# Patient Record
Sex: FEMALE | Race: WHITE | Employment: STUDENT | URBAN - METROPOLITAN AREA
[De-identification: names, ages, dates, MRNs, and addresses within clinical notes are randomized per-mention and may not be internally consistent; named-entity substitution may affect disease eponyms.]

---

## 2017-01-23 ENCOUNTER — PRENATAL OFFICE VISIT (OUTPATIENT)
Dept: OBGYN | Facility: CLINIC | Age: 22
End: 2017-01-23
Payer: COMMERCIAL

## 2017-01-23 VITALS — BODY MASS INDEX: 17.16 KG/M2 | DIASTOLIC BLOOD PRESSURE: 76 MMHG | SYSTOLIC BLOOD PRESSURE: 106 MMHG | WEIGHT: 100 LBS

## 2017-01-23 DIAGNOSIS — O09.91 SUPERVISION OF HIGH RISK PREGNANCY IN FIRST TRIMESTER: Primary | ICD-10-CM

## 2017-01-23 PROCEDURE — 99207 ZZC PRENATAL VISIT: CPT | Performed by: OBSTETRICS & GYNECOLOGY

## 2017-01-23 NOTE — MR AVS SNAPSHOT
"              After Visit Summary   1/23/2017    Rima Irving    MRN: [id number]           Patient Information     Date Of Birth          1995        Visit Information        Provider Department      1/23/2017 3:20 PM Alivia Arizmendi MD [facility]        Today's Diagnoses     Supervision of high risk pregnancy in first trimester    -  1        Follow-ups after your visit        Your next 10 appointments already scheduled     Feb 20, 2017  3:20 PM   ESTABLISHED PRENATAL with Alivia Arizmendi MD   [facility] ([facility])    [address] [place] [address]   [phone]              Who to contact     If you have questions or need follow up information about today's clinic visit or your schedule please contact [facility] directly at [phone].  Normal or non-critical lab and imaging results will be communicated to you by MyChart, letter or phone within 4 business days after the clinic has received the results. If you do not hear from us within 7 days, please contact the clinic through MyChart or phone. If you have a critical or abnormal lab result, we will notify you by phone as soon as possible.  Submit refill requests through [employer] or call your pharmacy and they will forward the refill request to us. Please allow 3 business days for your refill to be completed.          Additional Information About Your Visit        MyChart Information     Fam[employer]et lets you send messages to your doctor, view your test results, renew your prescriptions, schedule appointments and more. To sign up, go to [email or web address] . Click on \"Log in\" on the left side of the screen, which will take you to the Welcome page. Then click on \"Sign up Now\" on the right side of the page.     You will be asked to enter the access code listed below, as well as some personal information. Please follow the directions to create " your username and password.     Your access code is: [email or web address]  Expires: 3/9/2017 11:33 AM     Your access code will  in 90 days. If you need help or a new code, please call your [facility] clinic or [phone].        Care EveryWhere ID     This is your Care EveryWhere ID. This could be used by other organizations to access your [facility] medical records  [id number]        Your Vitals Were     Last Period                   10/24/2016 (Exact Date)            Blood Pressure from Last 3 Encounters:   17 106/76   16 102/68   16 94/54    Weight from Last 3 Encounters:   17 100 lb (45.36 kg)   16 102 lb 6.4 oz (46.448 kg)   16 101 lb (45.813 kg)              Today, you had the following     No orders found for display       Primary Care Provider    Physician No Ref-Primary       No address on file        Thank you!     Thank you for choosing [facility]  for your care. Our goal is always to provide you with excellent care. Hearing back from our patients is one way we can continue to improve our services. Please take a few minutes to complete the written survey that you may receive in the mail after your visit with us. Thank you!             Your Updated Medication List - Protect others around you: Learn how to safely use, store and throw away your medicines at www.disposemymeds.org.          This list is accurate as of: 17  3:31 PM.  Always use your most recent med list.                   Brand Name Dispense Instructions for use    prenatal multivitamin  with iron 28-0.8 MG Tabs     90 each    Take 1 tablet by mouth daily

## 2017-05-01 ENCOUNTER — HOSPITAL ENCOUNTER (OUTPATIENT)
Facility: CLINIC | Age: 22
Discharge: HOME | End: 2017-05-01
Attending: OBSTETRICS & GYNECOLOGY | Admitting: OBSTETRICS & GYNECOLOGY
Payer: COMMERCIAL

## 2017-05-01 ENCOUNTER — PRENATAL OFFICE VISIT (OUTPATIENT)
Dept: OBGYN | Facility: CLINIC | Age: 22
End: 2017-05-01
Payer: COMMERCIAL

## 2017-05-01 VITALS — DIASTOLIC BLOOD PRESSURE: 67 MMHG | TEMPERATURE: 99 F | RESPIRATION RATE: 16 BRPM | SYSTOLIC BLOOD PRESSURE: 112 MMHG

## 2017-05-01 VITALS — DIASTOLIC BLOOD PRESSURE: 56 MMHG | BODY MASS INDEX: 20.43 KG/M2 | SYSTOLIC BLOOD PRESSURE: 104 MMHG | WEIGHT: 119 LBS

## 2017-05-01 DIAGNOSIS — I49.49 PREMATURE CONTRACTION: ICD-10-CM

## 2017-05-01 DIAGNOSIS — O09.92 SUPERVISION OF HIGH RISK PREGNANCY IN SECOND TRIMESTER: Primary | ICD-10-CM

## 2017-05-01 LAB — FIBRONECTIN FETAL VAG QL: NORMAL

## 2017-05-01 PROCEDURE — 96372 THER/PROPH/DIAG INJ SC/IM: CPT

## 2017-05-01 PROCEDURE — 82731 ASSAY OF FETAL FIBRONECTIN: CPT | Performed by: OBSTETRICS & GYNECOLOGY

## 2017-05-01 PROCEDURE — 99213 OFFICE O/P EST LOW 20 MIN: CPT | Mod: 25

## 2017-05-01 PROCEDURE — 99207 ZZC PRENATAL VISIT: CPT | Performed by: OBSTETRICS & GYNECOLOGY

## 2017-05-01 PROCEDURE — 25000125 ZZHC RX 250: Performed by: OBSTETRICS & GYNECOLOGY

## 2017-05-01 RX ORDER — BETAMETHASONE SODIUM PHOSPHATE AND BETAMETHASONE ACETATE 3; 3 MG/ML; MG/ML
12 INJECTION, SUSPENSION INTRA-ARTICULAR; INTRALESIONAL; INTRAMUSCULAR; SOFT TISSUE EVERY 24 HOURS
Status: DISCONTINUED | OUTPATIENT
Start: 2017-05-01 | End: 2017-05-01 | Stop reason: HOSPADM

## 2017-05-01 RX ORDER — BETAMETHASONE SODIUM PHOSPHATE AND BETAMETHASONE ACETATE 3; 3 MG/ML; MG/ML
INJECTION, SUSPENSION INTRA-ARTICULAR; INTRALESIONAL; INTRAMUSCULAR; SOFT TISSUE
Status: DISCONTINUED
Start: 2017-05-01 | End: 2017-05-01 | Stop reason: HOSPADM

## 2017-05-01 RX ADMIN — BETAMETHASONE SODIUM PHOSPHATE AND BETAMETHASONE ACETATE 12 MG: 3; 3 INJECTION, SUSPENSION INTRA-ARTICULAR; INTRALESIONAL; INTRAMUSCULAR at 17:28

## 2017-05-01 NOTE — IP AVS SNAPSHOT
MRN:[id number]                      After Visit Summary   5/1/2017    Rima Irving    MRN: [id number]           Thank you!     Thank you for choosing [facility] for your care. Our goal is always to provide you with excellent care. Hearing back from our patients is one way we can continue to improve our services. Please take a few minutes to complete the written survey that you may receive in the mail after you visit with us. Thank you!        Patient Information     Date Of Birth          1995        About your hospital stay     You were admitted on:  May 1, 2017 You last received care in the:  [facility]    You were discharged on:  May 1, 2017       Who to Call     For medical emergencies, please call 911.  For non-urgent questions about your medical care, please call your primary care provider or clinic, None          Attending Provider     Provider Specialty    Alivia Arizmendi MD OB/Gyn       Primary Care Provider    Physician No Ref-Primary       No address on file        Your next 10 appointments already scheduled     May 10, 2017  2:00 PM CDT   LAB with WE LAB   [facility]    [address] [place] [id number]   [phone]           Patient must bring picture ID.  Patient should be prepared to give a urine specimen  Please do not eat 10-12 hours before your appointment if you are coming in fasting for labs on lipids, cholesterol, or glucose (sugar).  Pregnant women should follow their Care Team instructions. Water with medications is okay. Do not drink coffee or other fluids.   If you have concerns about taking  your medications, please ask at office or if scheduling via [employer], send a message by clicking on Secure Messaging, Message Your Care Team.            May 10, 2017  2:15 PM CDT   US PELVIC COMPLETE W TRANSVAGINAL with WEUS1   [facility] ([facility]  [place])    [address] [address]   [phone]           Please bring a list of your medicines (including vitamins, minerals and over-the-counter drugs). Also, tell your doctor about any allergies you may have. Wear comfortable clothes and leave your valuables at home.  Adults: Drink six 8-ounce glasses of fluid one hour before your exam. Do NOT empty your bladder.  If you need to empty your bladder before your exam, try to release only a little bit of urine. Then, drink another 8oz glass of fluid.  Children: Children who are potty trained should drink at least 4 cups (32 oz) of liquid 45 minutes to one hour prior to the exam. The child s bladder must be full in order to achieve a diagnostic exam. If your child is very uncomfortable or has an urgent need to pee, please notify a technologist; they will try to find out how much longer the wait may be and provide instructions to help relieve the pressure. Occasionally it is medically necessary to insert a urinary catheter to fill the bladder.  Please call the Imaging Department at your exam site with any questions.            May 10, 2017  2:50 PM CDT   ESTABLISHED PRENATAL with Alivia Arizmendi MD   [facility] [place] ([facility])    [address] [id number] [phone]              Further instructions from your care team       Discharge Instructions for Undelivered Patients      You were seen for: Labor Assessment  We Consulted: Dr. Arizmendi  You had (Test or Medicine): fetal & uterine monitoring, Betamethasone injection     Diet:   Drink 8 to 12 glasses of liquids (milk, juice, water) every day.  You may eat meals and snacks.     Activity:  Count fetal kicks everyday (see handout)  Call your doctor or nurse midwife if your baby is moving less than usual.   No intercourse until further notice with Doctor.    Call your provider if you notice:  Swelling in your face  "or increased swelling in your hands or legs.  Headaches that are not relieved by Tylenol (acetaminophen).  Changes in your vision (blurring: seeing spots or stars.)  Nausea (sick to your stomach) and vomiting (throwing up).   Weight gain of 5 pounds or more per week.  Heartburn that doesn't go away.  Signs of bladder infection: pain when you urinate (use the toilet), need to go more often and more urgently.  The bag of sevilla (rupture of membranes) breaks, or you notice leaking in your underwear.  Bright red blood in your underwear.  Abdominal (lower belly) or stomach pain.  For first baby: Contractions (tightening) less than 5 minutes apart for one hour or more.  Second (plus) baby: Contractions (tightening) less than 10 minutes apart and getting stronger.  *If less than 34 weeks: Contractions (tightenings) more than 6 times in one hour.  Increase or change in vaginal discharge (note the color and amount)    Follow-up:  As scheduled in the clinic - Wednesday 5/3/17          Pending Results     No orders found from 2017 to 2017.            Admission Information     Date & Time Provider Department Dept. Phone    2017 Alivia Arizmendi MD [facility] [phone]      Your Vitals Were     Last Period                   10/24/2016 (Exact Date)           MyChart Information     [employer] lets you send messages to your doctor, view your test results, renew your prescriptions, schedule appointments and more. To sign up, go to [email or web address] . Click on \"Log in\" on the left side of the screen, which will take you to the Welcome page. Then click on \"Sign up Now\" on the right side of the page.     You will be asked to enter the access code listed below, as well as some personal information. Please follow the directions to create your username and password.     Your access code is: [email or web address]  Expires: 2017 10:46 AM     Your access code will  in 90 days. If you need help or a new " code, please call your [facility] clinic or [phone].        Care EveryWhere ID     This is your Care EveryWhere ID. This could be used by other organizations to access your [facility] medical records  [id number]           Review of your medicines      UNREVIEWED medicines. Ask your doctor about these medicines        Dose / Directions    prenatal multivitamin  with iron 28-0.8 MG Tabs   Used for:  Pregnancy test performed, pregnancy confirmed        Dose:  1 tablet   Take 1 tablet by mouth daily   Quantity:  90 each   Refills:  2                Protect others around you: Learn how to safely use, store and throw away your medicines at www.disposemymeds.org.             Medication List: This is a list of all your medications and when to take them. Check marks below indicate your daily home schedule. Keep this list as a reference.      Medications           Morning Afternoon Evening Bedtime As Needed    prenatal multivitamin  with iron 28-0.8 MG Tabs   Take 1 tablet by mouth daily

## 2017-05-01 NOTE — DISCHARGE INSTRUCTIONS
Discharge Instructions for Undelivered Patients      You were seen for: Labor Assessment  We Consulted: Dr. Arizmendi  You had (Test or Medicine): fetal & uterine monitoring, Betamethasone injection     Diet:   Drink 8 to 12 glasses of liquids (milk, juice, water) every day.  You may eat meals and snacks.     Activity:  Count fetal kicks everyday (see handout)  Call your doctor or nurse midwife if your baby is moving less than usual.   No intercourse until further notice with Doctor.    Call your provider if you notice:  Swelling in your face or increased swelling in your hands or legs.  Headaches that are not relieved by Tylenol (acetaminophen).  Changes in your vision (blurring: seeing spots or stars.)  Nausea (sick to your stomach) and vomiting (throwing up).   Weight gain of 5 pounds or more per week.  Heartburn that doesn't go away.  Signs of bladder infection: pain when you urinate (use the toilet), need to go more often and more urgently.  The bag of sevilla (rupture of membranes) breaks, or you notice leaking in your underwear.  Bright red blood in your underwear.  Abdominal (lower belly) or stomach pain.  For first baby: Contractions (tightening) less than 5 minutes apart for one hour or more.  Second (plus) baby: Contractions (tightening) less than 10 minutes apart and getting stronger.  *If less than 34 weeks: Contractions (tightenings) more than 6 times in one hour.  Increase or change in vaginal discharge (note the color and amount)    Follow-up:  As scheduled in the clinic - Wednesday 5/3/17

## 2017-05-01 NOTE — PROGRESS NOTES
Patient presents to [facility] ambulatory at 1552 at 27w0d  for a  labor evaluation.  She was seen in the clinic today and had a +FFN.  She notes having intermittent low abdominal pressure x1 week.  Cervix was closed.  She denies vaginal bleeding, leaking fluid, contractions, and notes +FM.  She consents to fetal & uterine monitoring.  She notes having psoriasis x3 years now; once in a while she uses lotion or water to help the itching during pregnancy, otherwise doesn't use medication.  She denies current alcohol, drug, or tobacco use.  She notes quitting smoking at the beginning of pregnancy.     Category 1 tracing, no contractions noted for the first 30 minutes of monitoring, then occasional with irritability.  Pt notes only feeling low abdominal pressure while standing or walking.  She notes some low abdominal pressure while sitting in bed here for the last 30 minutes of monitoring, emptied bladder and felt better.  Spoke to Dr. Arizmendi on phone to give update.  Pt notes having had intercourse in the last 24 hours, possibly a false positive FFN result.  Pt denies feeling regular pressure or contractions.  Dr. Arizmendi recommends Betamethasone injection, then d/c to home.  Informed pt and her significant other about Betamethasone, her boyfriend is very hesitant stating he doesn't want any vaccines given.  He questions the ingredients in the medication; pharmacy sent the medication package insert information for him to read.  He doesn't agree with the medication, even after thorough discussion, but the patient does consent.  She states her understanding of the Betamethasone and consents for me to inject it.  She knows to return to the [facility] tomorrow at 1730.  D/C instructions printed and explained to pt - she states understanding, then signs.

## 2017-05-01 NOTE — MR AVS SNAPSHOT
After Visit Summary   5/1/2017    Rima Irving    MRN: [id number]           Patient Information     Date Of Birth          1995        Visit Information        Provider Department      5/1/2017 10:20 AM Alivia [facility] MD [facility]        Today's Diagnoses     Supervision of high risk pregnancy in second trimester    -  1    Premature contraction           Follow-ups after your visit        Your next 10 appointments already scheduled     May 10, 2017  2:00 PM CDT   [facility]    [address] [place] [address]   [phone]           Patient must bring picture ID.  Patient should be prepared to give a urine specimen  Please do not eat 10-12 hours before your appointment if you are coming in fasting for labs on lipids, cholesterol, or glucose (sugar).  Pregnant women should follow their Care Team instructions. Water with medications is okay. Do not drink coffee or other fluids.   If you have concerns about taking  your medications, please ask at office or if scheduling via [employer], send a message by clicking on Secure Messaging, Message Your Care Team.            May 10, 2017  2:15 PM CDT   US PELVIC COMPLETE W TRANSVAGINAL with WEUS[facility]    [address] [place] [address]   [phone]           Please bring a list of your medicines (including vitamins, minerals and over-the-counter drugs). Also, tell your doctor about any allergies you may have. Wear comfortable clothes and leave your valuables at home.  Adults: Drink six 8-ounce glasses of fluid one hour before your exam. Do NOT empty your bladder.  If you need to empty your bladder before your exam, try to release only a little bit of urine. Then, drink another 8oz glass of fluid.  Children: Children who are potty trained should  "drink at least 4 cups (32 oz) of liquid 45 minutes to one hour prior to the exam. The child s bladder must be full in order to achieve a diagnostic exam. If your child is very uncomfortable or has an urgent need to pee, please notify a technologist; they will try to find out how much longer the wait may be and provide instructions to help relieve the pressure. Occasionally it is medically necessary to insert a urinary catheter to fill the bladder.  Please call the Imaging Department at your exam site with any questions.            May 10, 2017  2:50 PM CDT   ESTABLISHED PRENATAL with Alivia Arizmendi MD   [facility] ([facility])    [address] [place] [id number] [phone]              Who to contact     If you have questions or need follow up information about today's clinic visit or your schedule please contact [facility] [place] directly at [phone].  Normal or non-critical lab and imaging results will be communicated to you by redIThart, letter or phone within 4 business days after the clinic has received the results. If you do not hear from us within 7 days, please contact the clinic through redIThart or phone. If you have a critical or abnormal lab result, we will notify you by phone as soon as possible.  Submit refill requests through [employer] or call your pharmacy and they will forward the refill request to us. Please allow 3 business days for your refill to be completed.          Additional Information About Your Visit        [employer] Information     [employer] lets you send messages to your doctor, view your test results, renew your prescriptions, schedule appointments and more. To sign up, go to [email or web address] . Click on \"Log in\" on the left side of the screen, which will take you to the Welcome page. Then click on \"Sign up Now\" on the right side of the page.     You will be asked to enter the access code listed " below, as well as some personal information. Please follow the directions to create your username and password.     Your access code is: [email or web address]  Expires: 2017 10:46 AM     Your access code will  in 90 days. If you need help or a new code, please call your [facility] clinic or [phone].        Care EveryWhere ID     This is your Care EveryWhere ID. This could be used by other organizations to access your [facility] medical records  [id number]        Your Vitals Were     Last Period BMI (Body Mass Index)                10/24/2016 (Exact Date) 20.43 kg/m2           Blood Pressure from Last 3 Encounters:   17 104/56   17 106/76   16 102/68    Weight from Last 3 Encounters:   17 119 lb (54 kg)   17 100 lb (45.4 kg)   16 102 lb 6.4 oz (46.4 kg)              We Performed the Following     Fetal fibronectin        Primary Care Provider    Physician No Ref-Primary       No address on file        Thank you!     Thank you for choosing [facility]  for your care. Our goal is always to provide you with excellent care. Hearing back from our patients is one way we can continue to improve our services. Please take a few minutes to complete the written survey that you may receive in the mail after your visit with us. Thank you!             Your Updated Medication List - Protect others around you: Learn how to safely use, store and throw away your medicines at www.disposemymeds.org.          This list is accurate as of: 17 10:51 AM.  Always use your most recent med list.                   Brand Name Dispense Instructions for use    prenatal multivitamin  with iron 28-0.8 MG Tabs     90 each    Take 1 tablet by mouth daily

## 2017-05-01 NOTE — PROGRESS NOTES
Return 1 week for ob complete us and gluc screen  ffn done today due to some pressure symptoms  Has not shown for prenatal care since 13 wks  Cervix closed, no bleeding or leaking fluid

## 2017-05-01 NOTE — IP AVS SNAPSHOT
[address], [address]    [facility] [address]    Phone:  [phone]                                       After Visit Summary   5/1/2017    Rima Irving    MRN: [id number]           After Visit Summary Signature Page     I have received my discharge instructions, and my questions have been answered. I have discussed any challenges I see with this plan with the nurse or doctor.    ..........................................................................................................................................  Patient/Patient Representative Signature      ..........................................................................................................................................  Patient Representative Print Name and Relationship to Patient    ..................................................               ................................................  Date                                            Time    ..........................................................................................................................................  Reviewed by Signature/Title    ...................................................              ..............................................  Date                                                            Time

## 2017-05-10 ENCOUNTER — PRENATAL OFFICE VISIT (OUTPATIENT)
Dept: OBGYN | Facility: CLINIC | Age: 22
End: 2017-05-10
Payer: COMMERCIAL

## 2017-05-10 ENCOUNTER — RADIANT APPOINTMENT (OUTPATIENT)
Dept: ULTRASOUND IMAGING | Facility: CLINIC | Age: 22
End: 2017-05-10
Payer: COMMERCIAL

## 2017-05-10 VITALS — SYSTOLIC BLOOD PRESSURE: 90 MMHG | DIASTOLIC BLOOD PRESSURE: 58 MMHG | WEIGHT: 122 LBS | BODY MASS INDEX: 20.94 KG/M2

## 2017-05-10 DIAGNOSIS — O09.92 SUPERVISION OF HIGH RISK PREGNANCY IN SECOND TRIMESTER: Primary | ICD-10-CM

## 2017-05-10 DIAGNOSIS — Z36.89 ENCOUNTER FOR FETAL ANATOMIC SURVEY: ICD-10-CM

## 2017-05-10 DIAGNOSIS — O09.92 SUPERVISION OF HIGH RISK PREGNANCY IN SECOND TRIMESTER: ICD-10-CM

## 2017-05-10 DIAGNOSIS — Z23 NEED FOR TDAP VACCINATION: ICD-10-CM

## 2017-05-10 DIAGNOSIS — Z36.9 ENCOUNTER FOR ANTENATAL SCREENING OF MOTHER: Primary | ICD-10-CM

## 2017-05-10 LAB
GLUCOSE 1H P 50 G GLC PO SERPL-MCNC: 102 MG/DL (ref 60–129)
HGB BLD-MCNC: 10.3 G/DL (ref 11.7–15.7)

## 2017-05-10 PROCEDURE — 82950 GLUCOSE TEST: CPT | Performed by: OBSTETRICS & GYNECOLOGY

## 2017-05-10 PROCEDURE — 76805 OB US >/= 14 WKS SNGL FETUS: CPT | Performed by: OBSTETRICS & GYNECOLOGY

## 2017-05-10 PROCEDURE — 00000218 ZZHCL STATISTIC OBHBG - HEMOGLOBIN: Performed by: OBSTETRICS & GYNECOLOGY

## 2017-05-10 PROCEDURE — 99207 ZZC PRENATAL VISIT: CPT | Performed by: OBSTETRICS & GYNECOLOGY

## 2017-05-10 PROCEDURE — 36415 COLL VENOUS BLD VENIPUNCTURE: CPT | Performed by: OBSTETRICS & GYNECOLOGY

## 2017-05-10 NOTE — PROGRESS NOTES
+FFN last week so received betasmethasone inj,  Could have been false positive from intercourse day before but no way to tell  Feels ok now   Normal anatomy scan today  Denies ctx, bleeding  Good fm

## 2017-05-10 NOTE — MR AVS SNAPSHOT
"              After Visit Summary   5/10/2017    Rima Irving    MRN: [id number]           Patient Information     Date Of Birth          1995        Visit Information        Provider Department      5/10/2017 2:50 PM Alivia Arizmendi MD [facility]        Today's Diagnoses     Supervision of high risk pregnancy in second trimester    -  1       Follow-ups after your visit        Future tests that were ordered for you today     Open Future Orders        Priority Expected Expires Ordered    TDAP VACCINE (ADACEL) Routine  5/31/2017 5/9/2017    ADMIN 1st VACCINE Routine  5/31/2017 5/9/2017            Who to contact     If you have questions or need follow up information about today's clinic visit or your schedule please contact [facility] directly at [phone].  Normal or non-critical lab and imaging results will be communicated to you by MyChart, letter or phone within 4 business days after the clinic has received the results. If you do not hear from us within 7 days, please contact the clinic through MyChart or phone. If you have a critical or abnormal lab result, we will notify you by phone as soon as possible.  Submit refill requests through [employer] or call your pharmacy and they will forward the refill request to us. Please allow 3 business days for your refill to be completed.          Additional Information About Your Visit        MyChart Information     [employer] lets you send messages to your doctor, view your test results, renew your prescriptions, schedule appointments and more. To sign up, go to [email or web address] . Click on \"Log in\" on the left side of the screen, which will take you to the Welcome page. Then click on \"Sign up Now\" on the right side of the page.     You will be asked to enter the access code listed below, as well as some personal information. Please follow the directions to create your username and password.     Your access code is: " [email or web address]  Expires: 2017 10:46 AM     Your access code will  in 90 days. If you need help or a new code, please call your [place] clinic or [phone].        Care EveryWhere ID     This is your Care EveryWhere ID. This could be used by other organizations to access your [place] medical records  [id number]        Your Vitals Were     Last Period BMI (Body Mass Index)                10/24/2016 (Exact Date) 20.94 kg/m2           Blood Pressure from Last 3 Encounters:   05/10/17 90/58   17 112/67   17 104/56    Weight from Last 3 Encounters:   05/10/17 122 lb (55.3 kg)   17 119 lb (54 kg)   17 100 lb (45.4 kg)              Today, you had the following     No orders found for display       Primary Care Provider    Physician No Ref-Primary       No address on file        Thank you!     Thank you for choosing [facility] [place]  for your care. Our goal is always to provide you with excellent care. Hearing back from our patients is one way we can continue to improve our services. Please take a few minutes to complete the written survey that you may receive in the mail after your visit with us. Thank you!             Your Updated Medication List - Protect others around you: Learn how to safely use, store and throw away your medicines at www.disposemymeds.org.          This list is accurate as of: 5/10/17  3:16 PM.  Always use your most recent med list.                   Brand Name Dispense Instructions for use    prenatal multivitamin  with iron 28-0.8 MG Tabs     90 each    Take 1 tablet by mouth daily

## 2017-05-24 ENCOUNTER — PRENATAL OFFICE VISIT (OUTPATIENT)
Dept: OBGYN | Facility: CLINIC | Age: 22
End: 2017-05-24
Payer: COMMERCIAL

## 2017-05-24 VITALS — SYSTOLIC BLOOD PRESSURE: 118 MMHG | WEIGHT: 123 LBS | BODY MASS INDEX: 21.11 KG/M2 | DIASTOLIC BLOOD PRESSURE: 62 MMHG

## 2017-05-24 DIAGNOSIS — O09.93 SUPERVISION OF HIGH RISK PREGNANCY IN THIRD TRIMESTER: ICD-10-CM

## 2017-05-24 PROCEDURE — 99207 ZZC PRENATAL VISIT: CPT | Performed by: OBSTETRICS & GYNECOLOGY

## 2017-05-24 NOTE — MR AVS SNAPSHOT
"              After Visit Summary   5/24/2017    Rima Irving    MRN: [id number]           Patient Information     Date Of Birth          1995        Visit Information        Provider Department      5/24/2017 4:10 PM Alivia Arizmendi MD [facility]        Today's Diagnoses     Supervision of high risk pregnancy in third trimester           Follow-ups after your visit        Your next 10 appointments already scheduled     Jun 14, 2017  4:10 PM CDT   ESTABLISHED PRENATAL with Alivia Arizmendi MD   [facility] ([facility])    [address] [place] [id number]   [phone]              Who to contact     If you have questions or need follow up information about today's clinic visit or your schedule please contact [facility] directly at [phone].  Normal or non-critical lab and imaging results will be communicated to you by MyChart, letter or phone within 4 business days after the clinic has received the results. If you do not hear from us within 7 days, please contact the clinic through MyChart or phone. If you have a critical or abnormal lab result, we will notify you by phone as soon as possible.  Submit refill requests through [employer] or call your pharmacy and they will forward the refill request to us. Please allow 3 business days for your refill to be completed.          Additional Information About Your Visit        MyChart Information     [employer] lets you send messages to your doctor, view your test results, renew your prescriptions, schedule appointments and more. To sign up, go to [email or web address] . Click on \"Log in\" on the left side of the screen, which will take you to the Welcome page. Then click on \"Sign up Now\" on the right side of the page.     You will be asked to enter the access code listed below, as well as some personal information. Please follow the directions to create " your username and password.     Your access code is: [email or web address]  Expires: 2017 10:46 AM     Your access code will  in 90 days. If you need help or a new code, please call your [place] clinic or [phone].        Care EveryWhere ID     This is your Care EveryWhere ID. This could be used by other organizations to access your [place] medical records  [id number]        Your Vitals Were     Last Period BMI (Body Mass Index)                10/24/2016 (Exact Date) 21.11 kg/m2           Blood Pressure from Last 3 Encounters:   17 118/62   05/10/17 90/58   17 112/67    Weight from Last 3 Encounters:   17 123 lb (55.8 kg)   05/10/17 122 lb (55.3 kg)   17 119 lb (54 kg)              Today, you had the following     No orders found for display       Primary Care Provider    Physician No Ref-Primary       No address on file        Thank you!     Thank you for choosing [facility] [place]  for your care. Our goal is always to provide you with excellent care. Hearing back from our patients is one way we can continue to improve our services. Please take a few minutes to complete the written survey that you may receive in the mail after your visit with us. Thank you!             Your Updated Medication List - Protect others around you: Learn how to safely use, store and throw away your medicines at www.disposemymeds.org.          This list is accurate as of: 17  5:02 PM.  Always use your most recent med list.                   Brand Name Dispense Instructions for use    prenatal multivitamin  with iron 28-0.8 MG Tabs     90 each    Take 1 tablet by mouth daily

## 2017-06-14 ENCOUNTER — PRENATAL OFFICE VISIT (OUTPATIENT)
Dept: OBGYN | Facility: CLINIC | Age: 22
End: 2017-06-14
Payer: COMMERCIAL

## 2017-06-14 VITALS — WEIGHT: 125 LBS | SYSTOLIC BLOOD PRESSURE: 102 MMHG | DIASTOLIC BLOOD PRESSURE: 56 MMHG | BODY MASS INDEX: 21.46 KG/M2

## 2017-06-14 DIAGNOSIS — O09.93 SUPERVISION OF HIGH RISK PREGNANCY IN THIRD TRIMESTER: ICD-10-CM

## 2017-06-14 PROCEDURE — 99207 ZZC PRENATAL VISIT: CPT | Performed by: OBSTETRICS & GYNECOLOGY

## 2017-06-14 NOTE — MR AVS SNAPSHOT
"              After Visit Summary   6/14/2017    Rima Irving    MRN: [id number]           Patient Information     Date Of Birth          1995        Visit Information        Provider Department      6/14/2017 4:10 PM Alivia Arizmendi MD [facility]        Today's Diagnoses     Supervision of high risk pregnancy in third trimester           Follow-ups after your visit        Your next 10 appointments already scheduled     Jun 26, 2017  4:20 PM CDT   ESTABLISHED PRENATAL with Ashia Mendoza MD   [facility]    [address] [place] [id number]   [phone]              Who to contact     If you have questions or need follow up information about today's clinic visit or your schedule please contact [facility] directly at [phone].  Normal or non-critical lab and imaging results will be communicated to you by MyChart, letter or phone within 4 business days after the clinic has received the results. If you do not hear from us within 7 days, please contact the clinic through MyChart or phone. If you have a critical or abnormal lab result, we will notify you by phone as soon as possible.  Submit refill requests through [employer] or call your pharmacy and they will forward the refill request to us. Please allow 3 business days for your refill to be completed.          Additional Information About Your Visit        MyChart Information     [employer] lets you send messages to your doctor, view your test results, renew your prescriptions, schedule appointments and more. To sign up, go to [email or web address] . Click on \"Log in\" on the left side of the screen, which will take you to the Welcome page. Then click on \"Sign up Now\" on the right side of the page.     You will be asked to enter the access code listed below, as well as some personal information. Please follow the directions to create " your username and password.     Your access code is: [email or web address]  Expires: 2017 10:46 AM     Your access code will  in 90 days. If you need help or a new code, please call your [place] clinic or [phone].        Care EveryWhere ID     This is your Care EveryWhere ID. This could be used by other organizations to access your [place] medical records  [id number]        Your Vitals Were     Last Period BMI (Body Mass Index)                10/24/2016 (Exact Date) 21.46 kg/m2           Blood Pressure from Last 3 Encounters:   17 102/56   17 118/62   05/10/17 90/58    Weight from Last 3 Encounters:   17 125 lb (56.7 kg)   17 123 lb (55.8 kg)   05/10/17 122 lb (55.3 kg)              Today, you had the following     No orders found for display       Primary Care Provider    Physician No Ref-Primary       No address on file        Thank you!     Thank you for choosing [facility] [place]  for your care. Our goal is always to provide you with excellent care. Hearing back from our patients is one way we can continue to improve our services. Please take a few minutes to complete the written survey that you may receive in the mail after your visit with us. Thank you!             Your Updated Medication List - Protect others around you: Learn how to safely use, store and throw away your medicines at www.disposemymeds.org.          This list is accurate as of: 17  4:46 PM.  Always use your most recent med list.                   Brand Name Dispense Instructions for use    prenatal multivitamin  with iron 28-0.8 MG Tabs     90 each    Take 1 tablet by mouth daily

## 2017-06-26 ENCOUNTER — PRENATAL OFFICE VISIT (OUTPATIENT)
Dept: OBGYN | Facility: CLINIC | Age: 22
End: 2017-06-26
Payer: COMMERCIAL

## 2017-06-26 VITALS — BODY MASS INDEX: 21.8 KG/M2 | WEIGHT: 127 LBS | SYSTOLIC BLOOD PRESSURE: 112 MMHG | DIASTOLIC BLOOD PRESSURE: 70 MMHG

## 2017-06-26 DIAGNOSIS — O09.93 SUPERVISION OF HIGH RISK PREGNANCY IN THIRD TRIMESTER: Primary | ICD-10-CM

## 2017-06-26 PROCEDURE — 99207 ZZC PRENATAL VISIT: CPT | Performed by: OBSTETRICS & GYNECOLOGY

## 2017-06-26 NOTE — MR AVS SNAPSHOT
"              After Visit Summary   6/26/2017    Rima Irving    MRN: [id number]           Patient Information     Date Of Birth          1995        Visit Information        Provider Department      6/26/2017 4:20 PM Ashia Mendoza MD [facility]        Today's Diagnoses     Supervision of high risk pregnancy in third trimester           Follow-ups after your visit        Your next 10 appointments already scheduled     Jul 03, 2017  3:50 PM CDT   ESTABLISHED PRENATAL with Alivia Arizmendi MD   [facility] ([facility])    [address] [place] [address]   [phone]              Who to contact     If you have questions or need follow up information about today's clinic visit or your schedule please contact [facility] directly at [phone].  Normal or non-critical lab and imaging results will be communicated to you by MyChart, letter or phone within 4 business days after the clinic has received the results. If you do not hear from us within 7 days, please contact the clinic through MyChart or phone. If you have a critical or abnormal lab result, we will notify you by phone as soon as possible.  Submit refill requests through [employer] or call your pharmacy and they will forward the refill request to us. Please allow 3 business days for your refill to be completed.          Additional Information About Your Visit        MyChart Information     [employer] lets you send messages to your doctor, view your test results, renew your prescriptions, schedule appointments and more. To sign up, go to [email or web address] . Click on \"Log in\" on the left side of the screen, which will take you to the Welcome page. Then click on \"Sign up Now\" on the right side of the page.     You will be asked to enter the access code listed below, as well as some personal information. Please follow the directions to create " your username and password.     Your access code is: [email or web address]  Expires: 2017 10:46 AM     Your access code will  in 90 days. If you need help or a new code, please call your [facility] clinic or [phone].        Care EveryWhere ID     This is your Care EveryWhere ID. This could be used by other organizations to access your [facility] medical records  [id number]        Your Vitals Were     Last Period BMI (Body Mass Index)                10/24/2016 (Exact Date) 21.8 kg/m2           Blood Pressure from Last 3 Encounters:   17 112/70   17 102/56   17 118/62    Weight from Last 3 Encounters:   17 127 lb (57.6 kg)   17 125 lb (56.7 kg)   17 123 lb (55.8 kg)              Today, you had the following     No orders found for display       Primary Care Provider    Physician No Ref-Primary       No address on file        Equal Access to Services     [facility] COLETTE : Hadii aad ku hadasho Soomaali, waaxda luqadaha, qaybta kaalmada adeegyada, waxay tamela gray . So [facility] [phone].    ATENCIÓN: Si habla español, tiene a mckeon disposición servicios gratuitos de asistencia lingüística. Llame al [phone].    We comply with applicable federal civil rights laws and [place] laws. We do not discriminate on the basis of race, color, national origin, age, disability sex, sexual orientation or gender identity.            Thank you!     Thank you for choosing [facility] [place]  for your care. Our goal is always to provide you with excellent care. Hearing back from our patients is one way we can continue to improve our services. Please take a few minutes to complete the written survey that you may receive in the mail after your visit with us. Thank you!             Your Updated Medication List - Protect others around you: Learn how to safely use, store and throw away your medicines at www.disposemymeds.org.          This list is accurate as of:  6/26/17  5:14 PM.  Always use your most recent med list.                   Brand Name Dispense Instructions for use Diagnosis    prenatal multivitamin  with iron 28-0.8 MG Tabs     90 each    Take 1 tablet by mouth daily    Pregnancy test performed, pregnancy confirmed

## 2017-06-27 ENCOUNTER — TELEPHONE (OUTPATIENT)
Dept: OBGYN | Facility: CLINIC | Age: 22
End: 2017-06-27

## 2017-06-27 DIAGNOSIS — O09.93 SUPERVISION OF HIGH RISK PREGNANCY IN THIRD TRIMESTER: ICD-10-CM

## 2017-06-27 NOTE — PROGRESS NOTES
"Patient was scheduled with me by mistake and was surprised to not be seeing [facility].  Feeling good FM. Complains of severe and painful ctx all the time when laying down. Not having them while at work. Likely not of concern since distraction seems to make her not notice them  On leopold's patient \"screamed\" quickly in pain when palpating at pubic bone. Very little pain tolerance. Found to presumably be breech. BSUS done and confirmed breech. Head in upper left and feet in mid left. Buttocks down.   Discussed ECV vs spontaneous version and primary c/s. Discussed why vaginal breech delivery not recommended.  Patient's pain tolerance will make an ECV impossible and she agrees. Is agreeable to scheduling a 39 weeks c/s and hoping for spont version.  Will see MC next appointment and have GBS and cvx check and discuss in more detail.      Please schedule for surgery:    Patient Name:  Rima Irving ([id number]).  :  1995      Physician requests assist from:  per mc  Requested Dates:  17(presumably unless  says otherwise)  Schedule based on:  EDC  Amount of time needed for the procedure:  1.5 hrs   Expected time off from work:  8 wks  Surgeon:  Alivia Arizmendi  Surgery permit to read:  Primary c/s  Preop Needed:  No  Location for surgery to performed:   L[facility]D  Anesthesia:  Regional block   No Known Allergies  Nickel allergy:  Not Applicable  EMB: Not applicable,     DIAGNOSIS:  Breech IUP at 39+3                        "

## 2017-06-27 NOTE — TELEPHONE ENCOUNTER
Physician requests assist from:  per mc  Requested Dates:  7/20/17(presumably unless  says otherwise)  Schedule based on:  EDC  Amount of time needed for the procedure:  1.5 hrs                     Expected time off from work:  8 wks  Surgeon:  Alivia Arizmendi  Surgery permit to read:  Primary c/s  Preop Needed:  No  Location for surgery to performed:   L&D  Anesthesia:  Regional block   No Known Allergies  Nickel allergy:  Not Applicable  EMB: Not applicable,      DIAGNOSIS:  Breech IUP at 39+3

## 2017-06-27 NOTE — TELEPHONE ENCOUNTER
Patient surgery scheduled on 7/20/2017 at 1PM  Check in 11AM  Location for surgery to performed:   L&D  Scheduled by Namita 6/27/2017     Information Packet given :Yes: MAILED 6/27/2017    CPT codes given: No            Consents signed? N/A  Rep Informed :N/A    PREOP DATE :  NA  In Epic :Yes    On Spreadsheet :Yes    On Calendar EB  :No    In [place] Calendar MAGDALENE  :No    Assist TBD VM for Nithya 6/27/2017 Text to Saray   Assist in Epic TBD  Assist Notified as needed :Yes see above    Iza Ballard  Surgery Scheduler

## 2017-06-28 NOTE — TELEPHONE ENCOUNTER
Per Dr Arizmendi she will go this alone and no assist is necessary    Iza Ballard  Surgery Scheduler

## 2017-07-10 ENCOUNTER — PRENATAL OFFICE VISIT (OUTPATIENT)
Dept: OBGYN | Facility: CLINIC | Age: 22
End: 2017-07-10
Payer: COMMERCIAL

## 2017-07-10 VITALS — BODY MASS INDEX: 22.66 KG/M2 | DIASTOLIC BLOOD PRESSURE: 60 MMHG | WEIGHT: 132 LBS | SYSTOLIC BLOOD PRESSURE: 98 MMHG

## 2017-07-10 DIAGNOSIS — Z36.85 SCREENING, ANTENATAL, FOR STREPTOCOCCUS B: Primary | ICD-10-CM

## 2017-07-10 DIAGNOSIS — O09.93 SUPERVISION OF HIGH RISK PREGNANCY IN THIRD TRIMESTER: ICD-10-CM

## 2017-07-10 PROCEDURE — 87653 STREP B DNA AMP PROBE: CPT | Performed by: OBSTETRICS & GYNECOLOGY

## 2017-07-10 PROCEDURE — 99207 ZZC PRENATAL VISIT: CPT | Performed by: OBSTETRICS & GYNECOLOGY

## 2017-07-10 NOTE — MR AVS SNAPSHOT
"              After Visit Summary   7/10/2017    Rima Irving    MRN: [id number]           Patient Information     Date Of Birth          1995        Visit Information        Provider Department      7/10/2017 4:20 PM Alivia Arizmendi MD [facility]        Today's Diagnoses     Screening, , for Streptococcus B    -  1    Supervision of high risk pregnancy in third trimester        Breech, red, not applicable or unspecified fetus           Follow-ups after your visit        Your next 10 appointments already scheduled     2017   Procedure with Alivia Arizmendi MD    Birthplace (--)    [address]  [facility] [address] [phone]              Who to contact     If you have questions or need follow up information about today's clinic visit or your schedule please contact [facility] directly at [phone].  Normal or non-critical lab and imaging results will be communicated to you by MyChart, letter or phone within 4 business days after the clinic has received the results. If you do not hear from us within 7 days, please contact the clinic through MyChart or phone. If you have a critical or abnormal lab result, we will notify you by phone as soon as possible.  Submit refill requests through [employer] or call your pharmacy and they will forward the refill request to us. Please allow 3 business days for your refill to be completed.          Additional Information About Your Visit        MyChart Information     [employer] lets you send messages to your doctor, view your test results, renew your prescriptions, schedule appointments and more. To sign up, go to [email or web address] . Click on \"Log in\" on the left side of the screen, which will take you to the Welcome page. Then click on \"Sign up Now\" on the right side of the page.     You will be asked to enter the access code listed below, as well as some personal information. Please " follow the directions to create your username and password.     Your access code is: [email or web address]  Expires: 2017 10:46 AM     Your access code will  in 90 days. If you need help or a new code, please call your [place] clinic or [phone].        Care EveryWhere ID     This is your Care EveryWhere ID. This could be used by other organizations to access your [place] medical records  [id number]        Your Vitals Were     Last Period BMI (Body Mass Index)                10/24/2016 (Exact Date) 22.66 kg/m2           Blood Pressure from Last 3 Encounters:   07/10/17 98/60   17 112/70   17 102/56    Weight from Last 3 Encounters:   07/10/17 59.9 kg (132 lb)   17 57.6 kg (127 lb)   17 56.7 kg (125 lb)              We Performed the Following     Strep, Group B by [facility]        Primary Care Provider    Physician No Ref-Primary       No address on file        Equal Access to Services     RICKY ALVARENGA : Hadii emiliano maldonadoo Sojedali, waaxda luqadaha, qaybta kaalmada adeegyaleticia, meghan gray . So [facility] [phone].    ATENCIÓN: Si habla español, tiene a mckeon disposición servicios gratuitos de asistencia lingüística. Llame al [phone].    We comply with applicable federal civil rights laws and [place] laws. We do not discriminate on the basis of race, color, national origin, age, disability sex, sexual orientation or gender identity.            Thank you!     Thank you for choosing [facility] [place]  for your care. Our goal is always to provide you with excellent care. Hearing back from our patients is one way we can continue to improve our services. Please take a few minutes to complete the written survey that you may receive in the mail after your visit with us. Thank you!             Your Updated Medication List - Protect others around you: Learn how to safely use, store and throw away your medicines at www.disposemymeds.org.          This list  is accurate as of: 7/10/17  5:44 PM.  Always use your most recent med list.                   Brand Name Dispense Instructions for use Diagnosis    prenatal multivitamin  with iron 28-0.8 MG Tabs     90 each    Take 1 tablet by mouth daily    Pregnancy test performed, pregnancy confirmed       ZANTAC PO      Take 150 mg by mouth

## 2017-07-10 NOTE — PROGRESS NOTES
Patient is scheduled for c section on 7/20  Doesn't tolerate vaginal exams  Finally agreed to gbs test today

## 2017-07-12 LAB
GP B STREP DNA SPEC QL NAA+PROBE: NORMAL
SPECIMEN SOURCE: NORMAL

## 2017-07-20 ENCOUNTER — SURGERY (OUTPATIENT)
Age: 22
End: 2017-07-20

## 2017-07-20 ENCOUNTER — ANESTHESIA EVENT (OUTPATIENT)
Dept: OBGYN | Facility: CLINIC | Age: 22
End: 2017-07-20
Payer: COMMERCIAL

## 2017-07-20 ENCOUNTER — ANESTHESIA (OUTPATIENT)
Dept: OBGYN | Facility: CLINIC | Age: 22
End: 2017-07-20
Payer: COMMERCIAL

## 2017-07-20 ENCOUNTER — HOSPITAL ENCOUNTER (OUTPATIENT)
Facility: CLINIC | Age: 22
Discharge: HOME | End: 2017-07-20
Attending: OBSTETRICS & GYNECOLOGY | Admitting: OBSTETRICS & GYNECOLOGY
Payer: COMMERCIAL

## 2017-07-20 VITALS
DIASTOLIC BLOOD PRESSURE: 72 MMHG | SYSTOLIC BLOOD PRESSURE: 113 MMHG | HEIGHT: 64 IN | RESPIRATION RATE: 18 BRPM | TEMPERATURE: 98.6 F | BODY MASS INDEX: 22.71 KG/M2 | WEIGHT: 133 LBS

## 2017-07-20 LAB
ABO + RH BLD: NORMAL
ABO + RH BLD: NORMAL
BLD GP AB SCN SERPL QL: NORMAL
BLOOD BANK CMNT PATIENT-IMP: NORMAL
ERYTHROCYTE [DISTWIDTH] IN BLOOD BY AUTOMATED COUNT: 13.9 % (ref 10–15)
HCT VFR BLD AUTO: 32.7 % (ref 35–47)
HGB BLD-MCNC: 10.7 G/DL (ref 11.7–15.7)
MCH RBC QN AUTO: 27.4 PG (ref 26.5–33)
MCHC RBC AUTO-ENTMCNC: 32.7 G/DL (ref 31.5–36.5)
MCV RBC AUTO: 84 FL (ref 78–100)
PLATELET # BLD AUTO: 144 10E9/L (ref 150–450)
RBC # BLD AUTO: 3.9 10E12/L (ref 3.8–5.2)
SPECIMEN EXP DATE BLD: NORMAL
WBC # BLD AUTO: 15.9 10E9/L (ref 4–11)

## 2017-07-20 PROCEDURE — 85027 COMPLETE CBC AUTOMATED: CPT | Performed by: OBSTETRICS & GYNECOLOGY

## 2017-07-20 PROCEDURE — 36415 COLL VENOUS BLD VENIPUNCTURE: CPT | Performed by: OBSTETRICS & GYNECOLOGY

## 2017-07-20 PROCEDURE — 86900 BLOOD TYPING SEROLOGIC ABO: CPT | Performed by: OBSTETRICS & GYNECOLOGY

## 2017-07-20 PROCEDURE — 86780 TREPONEMA PALLIDUM: CPT | Performed by: OBSTETRICS & GYNECOLOGY

## 2017-07-20 PROCEDURE — 86850 RBC ANTIBODY SCREEN: CPT | Performed by: OBSTETRICS & GYNECOLOGY

## 2017-07-20 PROCEDURE — 86901 BLOOD TYPING SEROLOGIC RH(D): CPT | Performed by: OBSTETRICS & GYNECOLOGY

## 2017-07-20 PROCEDURE — 12000029 ZZH R&B OB INTERMEDIATE

## 2017-07-20 PROCEDURE — 99214 OFFICE O/P EST MOD 30 MIN: CPT

## 2017-07-20 RX ORDER — CEFAZOLIN SODIUM 2 G/100ML
2 INJECTION, SOLUTION INTRAVENOUS
Status: DISCONTINUED | OUTPATIENT
Start: 2017-07-20 | End: 2017-07-20 | Stop reason: HOSPADM

## 2017-07-20 RX ORDER — SODIUM CHLORIDE, SODIUM LACTATE, POTASSIUM CHLORIDE, CALCIUM CHLORIDE 600; 310; 30; 20 MG/100ML; MG/100ML; MG/100ML; MG/100ML
INJECTION, SOLUTION INTRAVENOUS CONTINUOUS
Status: DISCONTINUED | OUTPATIENT
Start: 2017-07-20 | End: 2017-07-20 | Stop reason: HOSPADM

## 2017-07-20 RX ORDER — LIDOCAINE 40 MG/G
CREAM TOPICAL
Status: DISCONTINUED | OUTPATIENT
Start: 2017-07-20 | End: 2017-07-20 | Stop reason: HOSPADM

## 2017-07-20 RX ORDER — CITRIC ACID/SODIUM CITRATE 334-500MG
30 SOLUTION, ORAL ORAL
Status: DISCONTINUED | OUTPATIENT
Start: 2017-07-20 | End: 2017-07-20 | Stop reason: HOSPADM

## 2017-07-20 RX ORDER — ACETAMINOPHEN 325 MG/1
975 TABLET ORAL ONCE
Status: DISCONTINUED | OUTPATIENT
Start: 2017-07-20 | End: 2017-07-20 | Stop reason: HOSPADM

## 2017-07-20 RX ORDER — CEFAZOLIN SODIUM 1 G/3ML
1 INJECTION, POWDER, FOR SOLUTION INTRAMUSCULAR; INTRAVENOUS
Status: DISCONTINUED | OUTPATIENT
Start: 2017-07-20 | End: 2017-07-20 | Stop reason: HOSPADM

## 2017-07-20 NOTE — ANESTHESIA PREPROCEDURE EVALUATION
Anesthesia Evaluation     . Pt has not had prior anesthetic            ROS/MED HX    ENT/Pulmonary:      (-) sleep apnea   Neurologic:       Cardiovascular:         METS/Exercise Tolerance:     Hematologic:         Musculoskeletal:         GI/Hepatic:     (+) GERD       Renal/Genitourinary:         Endo:         Psychiatric:         Infectious Disease:         Malignancy:         Other:                     Physical Exam  Normal systems: dental    Airway   Mallampati: I  TM distance: >3 FB  Neck ROM: full    Dental     Cardiovascular   Rhythm and rate: regular      Pulmonary    breath sounds clear to auscultation                    Anesthesia Plan      History & Physical Review  History and physical reviewed and following examination; no interval change.    ASA Status:  2 .        Plan for General and ETT with Intravenous induction.   PONV prophylaxis:  Ondansetron (or other 5HT-3)       Postoperative Care      Consents  Anesthetic plan, risks, benefits and alternatives discussed with:  Patient..                          .

## 2017-07-20 NOTE — DISCHARGE INSTRUCTIONS
Discharge Instruction for Undelivered Patients      You were seen for:  section pre operative care  We Consulted: Dr. Arizmendi  You had (Test or Medicine): Fetal monitoring, lab work and ultrasound     Diet:   Drink 8 to 12 glasses of liquids (milk, juice, water) every day.  You may eat meals and snacks.     Activity:  Call your doctor or nurse midwife if your baby is moving less than usual.     Call your provider if you notice:  Swelling in your face or increased swelling in your hands or legs.  Headaches that are not relieved by Tylenol (acetaminophen).  Changes in your vision (blurring: seeing spots or stars.)  Nausea (sick to your stomach) and vomiting (throwing up).   Weight gain of 5 pounds or more per week.  Heartburn that doesn't go away.  Signs of bladder infection: pain when you urinate (use the toilet), need to go more often and more urgently.  The bag of sevilla (rupture of membranes) breaks, or you notice leaking in your underwear.  Bright red blood in your underwear.  Abdominal (lower belly) or stomach pain.  For first baby: Contractions (tightening) less than 5 minutes apart for one hour or more.  Second (plus) baby: Contractions (tightening) less than 10 minutes apart and getting stronger.  *If less than 34 weeks: Contractions (tightenings) more than 6 times in one hour.  Increase or change in vaginal discharge (note the color and amount)  Other: U    Follow-up:  As scheduled in the clinic

## 2017-07-20 NOTE — PLAN OF CARE
Gestational age noted to be 38+3 in pre procedure. Charge RN notified of gestational age. Dr. Arizmendi at bedside to discuss inability to do  section today due to gestational age. Bedside ultrasound performed by Dr. Arizmendi showed baby head down. Plan to discharge to home and anticipate future .

## 2017-07-20 NOTE — PROGRESS NOTES
Patient presented to [facility] for scheduled c section but dates were less than 39 wks so can't go ahead    Bedside ultrasound today shows cephalic presentation today    Anesthesia says her plaque psoriasis over lumbar area precludes regional anesthesia so when she returns in labor we will need to do nitrous, hydrotherapy and iv meds  If patient can't tolerate labor and decides on primary c section, general anesthesia will be required    Discussed with patient and boyfriend

## 2017-07-20 NOTE — IP AVS SNAPSHOT
[address], [address]    [facility] [address]    Phone:  [phone]                                       After Visit Summary   7/20/2017    Rima Irving    MRN: [id number]           After Visit Summary Signature Page     I have received my discharge instructions, and my questions have been answered. I have discussed any challenges I see with this plan with the nurse or doctor.    ..........................................................................................................................................  Patient/Patient Representative Signature      ..........................................................................................................................................  Patient Representative Print Name and Relationship to Patient    ..................................................               ................................................  Date                                            Time    ..........................................................................................................................................  Reviewed by Signature/Title    ...................................................              ..............................................  Date                                                            Time

## 2017-07-20 NOTE — IP AVS SNAPSHOT
MRN:[id number]                      After Visit Summary   2017    Rima Irving    MRN: [id number]           Thank you!     Thank you for choosing [facility] for your care. Our goal is always to provide you with excellent care. Hearing back from our patients is one way we can continue to improve our services. Please take a few minutes to complete the written survey that you may receive in the mail after you visit with us. Thank you!        Patient Information     Date Of Birth          1995        About your hospital stay     You were admitted on:  2017 You last received care in the:  [facility]    You were discharged on:  2017        Reason for your hospital stay       Indication for labor and delivery                  Who to Call     For medical emergencies, please call 911.  For non-urgent questions about your medical care, please call your primary care provider or clinic, None  For questions related to your surgery, please call your surgery clinic        Attending Provider     Provider Alivia Soriano MD OB/Gyn       Primary Care Provider    Physician No Ref-Primary      Further instructions from your care team       Discharge Instruction for Undelivered Patients      You were seen for:  section pre operative care  We Consulted: Dr. Arizmendi  You had (Test or Medicine): Fetal monitoring, lab work and ultrasound     Diet:   Drink 8 to 12 glasses of liquids (milk, juice, water) every day.  You may eat meals and snacks.     Activity:  Call your doctor or nurse midwife if your baby is moving less than usual.     Call your provider if you notice:  Swelling in your face or increased swelling in your hands or legs.  Headaches that are not relieved by Tylenol (acetaminophen).  Changes in your vision (blurring: seeing spots or stars.)  Nausea (sick to your stomach) and vomiting (throwing up).   Weight gain of 5 pounds or more per  "week.  Heartburn that doesn't go away.  Signs of bladder infection: pain when you urinate (use the toilet), need to go more often and more urgently.  The bag of sevilla (rupture of membranes) breaks, or you notice leaking in your underwear.  Bright red blood in your underwear.  Abdominal (lower belly) or stomach pain.  For first baby: Contractions (tightening) less than 5 minutes apart for one hour or more.  Second (plus) baby: Contractions (tightening) less than 10 minutes apart and getting stronger.  *If less than 34 weeks: Contractions (tightenings) more than 6 times in one hour.  Increase or change in vaginal discharge (note the color and amount)  Other: U    Follow-up:  As scheduled in the clinic          Pending Results     Date and Time Order Name Status Description    7/20/2017 1135 Anti Treponema In process             Statement of Approval     Ordered          07/20/17 7667  I have reviewed and agree with all the recommendations and orders detailed in this document.  EFFECTIVE NOW     Approved and electronically signed by:  Alivia Arizmendi MD             Admission Information     Date & Time Provider Department Dept. Phone    7/20/2017 Alivia Arizmendi MD [facility] [phone]      Your Vitals Were     Blood Pressure Temperature Respirations Height Weight Last Period    113/72 98.6  F (37  C) (Temporal) 18 1.626 m (5' 4\") 60.3 kg (133 lb) 10/24/2016 (Exact Date)    BMI (Body Mass Index)                   22.83 kg/m2           MyChart Information     [employer] lets you send messages to your doctor, view your test results, renew your prescriptions, schedule appointments and more. To sign up, go to [email or web address] . Click on \"Log in\" on the left side of the screen, which will take you to the Welcome page. Then click on \"Sign up Now\" on the right side of the page.     You will be asked to enter the access code listed below, as well as some personal information. Please follow the " directions to create your username and password.     Your access code is: [email or web address]  Expires: 2017 10:46 AM     Your access code will  in 90 days. If you need help or a new code, please call your [place] clinic or [phone].        Care EveryWhere ID     This is your Care EveryWhere ID. This could be used by other organizations to access your [place] medical records  [id number]        Equal Access to Services     KEVIN ALVARENGA : Hadii aad ku hadasho Soomaali, waaxda luqadaha, qaybta kaalmada adeegyada, waxay jessiein hayracquel gray . So [facility] [phone].    ATENCIÓN: Si habla español, tiene a mckeon disposición servicios gratuitos de asistencia lingüística. Llame al [phone].    We comply with applicable federal civil rights laws and [place] laws. We do not discriminate on the basis of race, color, national origin, age, disability sex, sexual orientation or gender identity.               Review of your medicines      CONTINUE these medicines which have NOT CHANGED        Dose / Directions    prenatal multivitamin  with iron 28-0.8 MG Tabs   Used for:  Pregnancy test performed, pregnancy confirmed        Dose:  1 tablet   Take 1 tablet by mouth daily   Quantity:  90 each   Refills:  2       ZANTAC PO        Dose:  150 mg   Take 150 mg by mouth   Refills:  0                Protect others around you: Learn how to safely use, store and throw away your medicines at www.disposemymeds.org.             Medication List: This is a list of all your medications and when to take them. Check marks below indicate your daily home schedule. Keep this list as a reference.      Medications           Morning Afternoon Evening Bedtime As Needed    prenatal multivitamin  with iron 28-0.8 MG Tabs   Take 1 tablet by mouth daily                                ZANTAC PO   Take 150 mg by mouth

## 2017-07-21 LAB — T PALLIDUM IGG+IGM SER QL: NEGATIVE

## 2017-08-02 ENCOUNTER — HOSPITAL ENCOUNTER (INPATIENT)
Facility: CLINIC | Age: 22
LOS: 4 days | Discharge: HOME | End: 2017-08-06
Attending: OBSTETRICS & GYNECOLOGY | Admitting: OBSTETRICS & GYNECOLOGY
Payer: COMMERCIAL

## 2017-08-02 ENCOUNTER — NURSE TRIAGE (OUTPATIENT)
Dept: NURSING | Facility: CLINIC | Age: 22
End: 2017-08-02

## 2017-08-02 DIAGNOSIS — G89.18 ACUTE POST-OPERATIVE PAIN: Primary | ICD-10-CM

## 2017-08-02 DIAGNOSIS — D62 ANEMIA DUE TO BLOOD LOSS, ACUTE: ICD-10-CM

## 2017-08-02 PROBLEM — Z36.89 ENCOUNTER FOR TRIAGE IN PREGNANT PATIENT: Status: ACTIVE | Noted: 2017-08-02

## 2017-08-02 PROBLEM — Z34.90 PREGNANCY: Status: ACTIVE | Noted: 2017-08-02

## 2017-08-02 LAB
ABO + RH BLD: NORMAL
ABO + RH BLD: NORMAL
AMPHETAMINES UR QL SCN: NORMAL
BARBITURATES UR QL: NORMAL
BASOPHILS # BLD AUTO: 0 10E9/L (ref 0–0.2)
BASOPHILS NFR BLD AUTO: 0.2 %
BENZODIAZ UR QL: NORMAL
BLD GP AB SCN SERPL QL: NORMAL
BLOOD BANK CMNT PATIENT-IMP: NORMAL
CANNABINOIDS UR QL SCN: NORMAL
COCAINE UR QL: NORMAL
DIFFERENTIAL METHOD BLD: ABNORMAL
EOSINOPHIL # BLD AUTO: 0.2 10E9/L (ref 0–0.7)
EOSINOPHIL NFR BLD AUTO: 1.4 %
ERYTHROCYTE [DISTWIDTH] IN BLOOD BY AUTOMATED COUNT: 14.6 % (ref 10–15)
HCT VFR BLD AUTO: 33.2 % (ref 35–47)
HGB BLD-MCNC: 10.6 G/DL (ref 11.7–15.7)
IMM GRANULOCYTES # BLD: 0.1 10E9/L (ref 0–0.4)
IMM GRANULOCYTES NFR BLD: 0.5 %
LYMPHOCYTES # BLD AUTO: 1.4 10E9/L (ref 0.8–5.3)
LYMPHOCYTES NFR BLD AUTO: 9.9 %
MCH RBC QN AUTO: 26.6 PG (ref 26.5–33)
MCHC RBC AUTO-ENTMCNC: 31.9 G/DL (ref 31.5–36.5)
MCV RBC AUTO: 83 FL (ref 78–100)
MONOCYTES # BLD AUTO: 1 10E9/L (ref 0–1.3)
MONOCYTES NFR BLD AUTO: 6.9 %
NEUTROPHILS # BLD AUTO: 11.2 10E9/L (ref 1.6–8.3)
NEUTROPHILS NFR BLD AUTO: 81.1 %
NRBC # BLD AUTO: 0 10*3/UL
NRBC BLD AUTO-RTO: 0 /100
OPIATES UR QL SCN: NORMAL
PCP UR QL SCN: NORMAL
PLATELET # BLD AUTO: 152 10E9/L (ref 150–450)
RBC # BLD AUTO: 3.98 10E12/L (ref 3.8–5.2)
SPECIMEN EXP DATE BLD: NORMAL
WBC # BLD AUTO: 13.8 10E9/L (ref 4–11)

## 2017-08-02 PROCEDURE — 85025 COMPLETE CBC W/AUTO DIFF WBC: CPT | Performed by: OBSTETRICS & GYNECOLOGY

## 2017-08-02 PROCEDURE — 86901 BLOOD TYPING SEROLOGIC RH(D): CPT | Performed by: OBSTETRICS & GYNECOLOGY

## 2017-08-02 PROCEDURE — 80307 DRUG TEST PRSMV CHEM ANLYZR: CPT | Performed by: OBSTETRICS & GYNECOLOGY

## 2017-08-02 PROCEDURE — 12000029 ZZH R&B OB INTERMEDIATE

## 2017-08-02 PROCEDURE — 86900 BLOOD TYPING SEROLOGIC ABO: CPT | Performed by: OBSTETRICS & GYNECOLOGY

## 2017-08-02 PROCEDURE — 59025 FETAL NON-STRESS TEST: CPT | Performed by: NURSE PRACTITIONER

## 2017-08-02 PROCEDURE — 86850 RBC ANTIBODY SCREEN: CPT | Performed by: OBSTETRICS & GYNECOLOGY

## 2017-08-02 PROCEDURE — 36415 COLL VENOUS BLD VENIPUNCTURE: CPT | Performed by: OBSTETRICS & GYNECOLOGY

## 2017-08-02 PROCEDURE — 25000128 H RX IP 250 OP 636

## 2017-08-02 PROCEDURE — 99215 OFFICE O/P EST HI 40 MIN: CPT | Mod: 25 | Performed by: NURSE PRACTITIONER

## 2017-08-02 RX ORDER — SODIUM CHLORIDE, SODIUM LACTATE, POTASSIUM CHLORIDE, CALCIUM CHLORIDE 600; 310; 30; 20 MG/100ML; MG/100ML; MG/100ML; MG/100ML
INJECTION, SOLUTION INTRAVENOUS CONTINUOUS
Status: DISCONTINUED | OUTPATIENT
Start: 2017-08-02 | End: 2017-08-03 | Stop reason: CLARIF

## 2017-08-02 RX ORDER — ONDANSETRON 2 MG/ML
4 INJECTION INTRAMUSCULAR; INTRAVENOUS EVERY 6 HOURS PRN
Status: DISCONTINUED | OUTPATIENT
Start: 2017-08-02 | End: 2017-08-03 | Stop reason: CLARIF

## 2017-08-02 RX ORDER — FENTANYL CITRATE 50 UG/ML
INJECTION, SOLUTION INTRAMUSCULAR; INTRAVENOUS
Status: COMPLETED
Start: 2017-08-02 | End: 2017-08-02

## 2017-08-02 RX ORDER — FENTANYL CITRATE 50 UG/ML
50-100 INJECTION, SOLUTION INTRAMUSCULAR; INTRAVENOUS
Status: DISCONTINUED | OUTPATIENT
Start: 2017-08-02 | End: 2017-08-03 | Stop reason: CLARIF

## 2017-08-02 RX ADMIN — FENTANYL CITRATE 50 MCG: 50 INJECTION, SOLUTION INTRAMUSCULAR; INTRAVENOUS at 23:05

## 2017-08-02 NOTE — TELEPHONE ENCOUNTER
"Caller  states that she \"lost her mucous plug\" and inquires if it is time to go to L&D;  contractions since 12 [place] but irregular and about 10 minutes apart; bag of water intact; primip; @ 40 weeks.   Additional Information    [1] First baby (primipara) AND [2] contractions > 5 minutes apart, or for < 2 hours     (all triage questions negative)    Protocols used: PREGNANCY - LABOR-ADULT-AH    "

## 2017-08-02 NOTE — IP AVS SNAPSHOT
MRN:[id number]                      After Visit Summary   2017    Rima Irving    MRN: [id number]           Thank you!     Thank you for choosing [facility] for your care. Our goal is always to provide you with excellent care. Hearing back from our patients is one way we can continue to improve our services. Please take a few minutes to complete the written survey that you may receive in the mail after you visit with us. Thank you!        Patient Information     Date Of Birth          1995        About your hospital stay     You were admitted on:  2017 You last received care in the:  [address]    You were discharged on:  2017        Reason for your hospital stay       c section                  Who to Call     For medical emergencies, please call 911.  For non-urgent questions about your medical care, please call your primary care provider or clinic, None  For questions related to your surgery, please call your surgery clinic        Attending Provider     Provider Specialty    Alivia Arizmendi MD OB/Gyn       Primary Care Provider    Physician No Ref-Primary      After Care Instructions     Activity       Your activity upon discharge: ambulate in house            Diet       Follow this diet upon discharge: Regular                  Follow-up Appointments     Follow-up and recommended labs and tests        Follow up with me,  Alivia Arizmendi, within 6 wks                  Further instructions from your care team       Postop  Birth Instructions    Activity       Do not lift more than 10 pounds for 6 weeks after surgery.  Ask family and friends for help when you need it.    No driving until you have stopped taking your pain medications (usually two weeks after surgery).    No heavy exercise or activity for 6 weeks.  Don't do anything that will put a strain on your surgery site.    Don't strain when using the toilet.  Your care team  may prescribe a stool softener if you have problems with your bowel movements.     To care for your incision:       Keep the incision clean and dry.    Do not soak your incision in water. No swimming or hot tubs until it has fully healed. You may soak in the bathtub if the water level is below your incision.    Do not use peroxide, gel, cream, lotion, or ointment on your incision.    Adjust your clothes to avoid pressure on your surgery site (check the elastic in your underwear for example).     You may see a small amount of clear or pink drainage and this is normal.  Check with your health care provider:       If the drainage increases or has an odor.    If the incision reddens, you have swelling, or develop a rash.    If you have increased pain and the medicine we prescribed doesn't help.    If you have a fever above 100.4 F (38 C) with or without chills when placing thermometer under your tongue.   The area around your incision (surgery wound), will feel numb.  This is normal. The numbness should go away in less than a year.     Keep your hands clean:  Always wash your hands before touching your incision (surgery wound). This helps reduce your risk of infection. If your hands aren't dirty, you may use an alcohol hand-rub to clean your hands. Keep your nails clean and short.    Call your healthcare provider if you have any of these symptoms:       You soak a sanitary pad with blood within 1 hour, or you see blood clots larger than a golf ball.    Bleeding that lasts more than 6 weeks.    Vaginal discharge that smells bad.    Severe pain, cramping or tenderness in your lower belly area.    A need to urinate more frequently (use the toilet more often), more urgently (use the toilet very quickly), or it burns when you urinate.    Nausea and vomiting.    Redness, swelling or pain around a vein in your leg.    Problems breastfeeding or a red or painful area on your breast.    Chest pain and cough or are gasping for  "air.    Problems with coping with sadness, anxiety or depression. If you have concerns about hurting yourself or the baby, call your provider immediately.      You have questions or concerns after you return home.                  Pending Results     No orders found from 2017 to 8/3/2017.            Statement of Approval     Ordered          17 0648  I have reviewed and agree with all the recommendations and orders detailed in this document.  EFFECTIVE NOW     Approved and electronically signed by:  Alivia Arizmendi MD             Admission Information     Date & Time Provider Department Dept. Phone    2017 Alivia Arizmendi MD [address] [phone]      Your Vitals Were     Blood Pressure Pulse Temperature Respirations Last Period Pulse Oximetry    116/76 86 98.4  F (36.9  C) (Oral) 16 10/24/2016 (Exact Date) 100%      MyChart Information     [employer] lets you send messages to your doctor, view your test results, renew your prescriptions, schedule appointments and more. To sign up, go to [email or web address] . Click on \"Log in\" on the left side of the screen, which will take you to the Welcome page. Then click on \"Sign up Now\" on the right side of the page.     You will be asked to enter the access code listed below, as well as some personal information. Please follow the directions to create your username and password.     Your access code is: [email or web address]  Expires: 2017 12:37 PM     Your access code will  in 90 days. If you need help or a new code, please call your [place] clinic or [phone].        Care EveryWhere ID     This is your Care EveryWhere ID. This could be used by other organizations to access your [place] medical records  [id number]        Equal Access to Services     RICKY ALVARENGA : Jono Barnes, billy dao, meghan hawkins. [facility] [phone].    ATENCIÓN: " Si rao adrian, tiene a mckeon disposición servicios gratuitos de asistencia lingüística. Will montalvo [phone].    We comply with applicable federal civil rights laws and [place] laws. We do not discriminate on the basis of race, color, national origin, age, disability sex, sexual orientation or gender identity.               Review of your medicines      START taking        Dose / Directions    ferrous sulfate 325 (65 FE) MG Tbec EC tablet   Used for:  Anemia due to blood loss, acute        Dose:  325 mg   Take 1 tablet (325 mg) by mouth daily   Quantity:  60 tablet   Refills:  0       oxyCODONE 5 MG IR tablet   Commonly known as:  ROXICODONE   Used for:  Acute post-operative pain        Dose:  5 mg   Take 1 tablet (5 mg) by mouth every 6 hours as needed for moderate to severe pain   Quantity:  30 tablet   Refills:  0         CONTINUE these medicines which have NOT CHANGED        Dose / Directions    prenatal multivitamin  with iron 28-0.8 MG Tabs   Used for:  Pregnancy test performed, pregnancy confirmed        Dose:  1 tablet   Take 1 tablet by mouth daily   Quantity:  90 each   Refills:  2       ZANTAC PO        Dose:  150 mg   Take 150 mg by mouth   Refills:  0            Where to get your medicines      These medications were sent to [facility] [place], [place] - [address]  [address], [place] [address]     Phone:  [phone]     ferrous sulfate 325 (65 FE) MG Tbec EC tablet         Some of these will need a paper prescription and others can be bought over the counter. Ask your nurse if you have questions.     Bring a paper prescription for each of these medications     oxyCODONE 5 MG IR tablet                Protect others around you: Learn how to safely use, store and throw away your medicines at www.disposemymeds.org.             Medication List: This is a list of all your medications and when to take them. Check marks below indicate your daily home schedule. Keep this  list as a reference.      Medications           Morning Afternoon Evening Bedtime As Needed    ferrous sulfate 325 (65 FE) MG Tbec EC tablet   Take 1 tablet (325 mg) by mouth daily   Next Dose Due:  8/7/17                                oxyCODONE 5 MG IR tablet   Commonly known as:  ROXICODONE   Take 1 tablet (5 mg) by mouth every 6 hours as needed for moderate to severe pain   Last time this was given:  10 mg on 8/6/2017 12:34 PM   Next Dose Due:  6:34pm                                  prenatal multivitamin  with iron 28-0.8 MG Tabs   Take 1 tablet by mouth daily   Next Dose Due:  8/7/17                                  ZANTAC PO   Take 150 mg by mouth

## 2017-08-02 NOTE — TELEPHONE ENCOUNTER
"After completing triage realized that question of fetal movement was not reviewed; called patient back and patient states she has not felt baby kick 'since Saturday\"    Because answers to question between  and wife are inconsistent and fear that there is  a knowledge deficit regarding labor, advised to proceed to L&D at [facility];  Notified L&D  Per phone @ 1852.   Reason for Disposition    Baby moving less today (e.g., kick count < 5 in 1 hour or < 10 in 2 hours)    Protocols used: PREGNANCY - LABOR-ADULT-  Julia Zimmerman RN  FNA    "

## 2017-08-02 NOTE — IP AVS SNAPSHOT
[address]    [place] [address]    Phone:  [phone]                                       After Visit Summary   8/2/2017    Rima Irving    MRN: [id number]           After Visit Summary Signature Page     I have received my discharge instructions, and my questions have been answered. I have discussed any challenges I see with this plan with the nurse or doctor.    ..........................................................................................................................................  Patient/Patient Representative Signature      ..........................................................................................................................................  Patient Representative Print Name and Relationship to Patient    ..................................................               ................................................  Date                                            Time    ..........................................................................................................................................  Reviewed by Signature/Title    ...................................................              ..............................................  Date                                                            Time

## 2017-08-03 ENCOUNTER — ANESTHESIA (OUTPATIENT)
Dept: OBGYN | Facility: CLINIC | Age: 22
End: 2017-08-03
Payer: COMMERCIAL

## 2017-08-03 ENCOUNTER — ANESTHESIA EVENT (OUTPATIENT)
Dept: OBGYN | Facility: CLINIC | Age: 22
End: 2017-08-03
Payer: COMMERCIAL

## 2017-08-03 PROCEDURE — 25000128 H RX IP 250 OP 636: Performed by: OBSTETRICS & GYNECOLOGY

## 2017-08-03 PROCEDURE — 25000132 ZZH RX MED GY IP 250 OP 250 PS 637: Performed by: OBSTETRICS & GYNECOLOGY

## 2017-08-03 PROCEDURE — 25000128 H RX IP 250 OP 636: Performed by: NURSE ANESTHETIST, CERTIFIED REGISTERED

## 2017-08-03 PROCEDURE — 37000009 ZZH ANESTHESIA TECHNICAL FEE, EACH ADDTL 15 MIN: Performed by: OBSTETRICS & GYNECOLOGY

## 2017-08-03 PROCEDURE — 36000058 ZZH SURGERY LEVEL 3 EA 15 ADDTL MIN: Performed by: OBSTETRICS & GYNECOLOGY

## 2017-08-03 PROCEDURE — 36000056 ZZH SURGERY LEVEL 3 1ST 30 MIN: Performed by: OBSTETRICS & GYNECOLOGY

## 2017-08-03 PROCEDURE — 25000125 ZZHC RX 250: Performed by: NURSE ANESTHETIST, CERTIFIED REGISTERED

## 2017-08-03 PROCEDURE — 25000566 ZZH SEVOFLURANE, EA 15 MIN: Performed by: OBSTETRICS & GYNECOLOGY

## 2017-08-03 PROCEDURE — 12000037 ZZH R&B POSTPARTUM INTERMEDIATE

## 2017-08-03 PROCEDURE — 27210794 ZZH OR GENERAL SUPPLY STERILE: Performed by: OBSTETRICS & GYNECOLOGY

## 2017-08-03 PROCEDURE — 25000128 H RX IP 250 OP 636: Performed by: ANESTHESIOLOGY

## 2017-08-03 PROCEDURE — 71000014 ZZH RECOVERY PHASE 1 LEVEL 2 FIRST HR: Performed by: OBSTETRICS & GYNECOLOGY

## 2017-08-03 PROCEDURE — 25000125 ZZHC RX 250: Performed by: OBSTETRICS & GYNECOLOGY

## 2017-08-03 PROCEDURE — 37000008 ZZH ANESTHESIA TECHNICAL FEE, 1ST 30 MIN: Performed by: OBSTETRICS & GYNECOLOGY

## 2017-08-03 PROCEDURE — 59510 CESAREAN DELIVERY: CPT | Performed by: OBSTETRICS & GYNECOLOGY

## 2017-08-03 RX ORDER — FENTANYL CITRATE 50 UG/ML
25-100 INJECTION, SOLUTION INTRAMUSCULAR; INTRAVENOUS
Status: DISCONTINUED | OUTPATIENT
Start: 2017-08-03 | End: 2017-08-03 | Stop reason: HOSPADM

## 2017-08-03 RX ORDER — CITRIC ACID/SODIUM CITRATE 334-500MG
30 SOLUTION, ORAL ORAL
Status: COMPLETED | OUTPATIENT
Start: 2017-08-03 | End: 2017-08-03

## 2017-08-03 RX ORDER — OXYTOCIN 10 [USP'U]/ML
10 INJECTION, SOLUTION INTRAMUSCULAR; INTRAVENOUS
Status: DISCONTINUED | OUTPATIENT
Start: 2017-08-03 | End: 2017-08-06 | Stop reason: HOSPADM

## 2017-08-03 RX ORDER — ERYTHROMYCIN 5 MG/G
OINTMENT OPHTHALMIC
Status: DISCONTINUED
Start: 2017-08-03 | End: 2017-08-03 | Stop reason: WASHOUT

## 2017-08-03 RX ORDER — ONDANSETRON 2 MG/ML
INJECTION INTRAMUSCULAR; INTRAVENOUS PRN
Status: DISCONTINUED | OUTPATIENT
Start: 2017-08-03 | End: 2017-08-03

## 2017-08-03 RX ORDER — ONDANSETRON 4 MG/1
4 TABLET, ORALLY DISINTEGRATING ORAL EVERY 30 MIN PRN
Status: DISCONTINUED | OUTPATIENT
Start: 2017-08-03 | End: 2017-08-03 | Stop reason: HOSPADM

## 2017-08-03 RX ORDER — CEFAZOLIN SODIUM 1 G/3ML
1 INJECTION, POWDER, FOR SOLUTION INTRAMUSCULAR; INTRAVENOUS
Status: DISCONTINUED | OUTPATIENT
Start: 2017-08-03 | End: 2017-08-03 | Stop reason: CLARIF

## 2017-08-03 RX ORDER — PROCHLORPERAZINE 25 MG
25 SUPPOSITORY, RECTAL RECTAL EVERY 12 HOURS PRN
Status: DISCONTINUED | OUTPATIENT
Start: 2017-08-03 | End: 2017-08-06 | Stop reason: HOSPADM

## 2017-08-03 RX ORDER — SIMETHICONE 80 MG
80 TABLET,CHEWABLE ORAL 4 TIMES DAILY PRN
Status: DISCONTINUED | OUTPATIENT
Start: 2017-08-03 | End: 2017-08-06 | Stop reason: HOSPADM

## 2017-08-03 RX ORDER — AMOXICILLIN 250 MG
1-2 CAPSULE ORAL 2 TIMES DAILY
Status: DISCONTINUED | OUTPATIENT
Start: 2017-08-03 | End: 2017-08-03

## 2017-08-03 RX ORDER — PROCHLORPERAZINE MALEATE 5 MG
5-10 TABLET ORAL EVERY 6 HOURS PRN
Status: DISCONTINUED | OUTPATIENT
Start: 2017-08-03 | End: 2017-08-05

## 2017-08-03 RX ORDER — ACETAMINOPHEN 325 MG/1
650 TABLET ORAL EVERY 4 HOURS PRN
Status: DISCONTINUED | OUTPATIENT
Start: 2017-08-06 | End: 2017-08-06 | Stop reason: HOSPADM

## 2017-08-03 RX ORDER — METOCLOPRAMIDE 10 MG/1
10 TABLET ORAL EVERY 6 HOURS PRN
Status: DISCONTINUED | OUTPATIENT
Start: 2017-08-03 | End: 2017-08-05

## 2017-08-03 RX ORDER — PROCHLORPERAZINE MALEATE 5 MG
5-10 TABLET ORAL EVERY 6 HOURS PRN
Status: DISCONTINUED | OUTPATIENT
Start: 2017-08-03 | End: 2017-08-06 | Stop reason: HOSPADM

## 2017-08-03 RX ORDER — METOCLOPRAMIDE HYDROCHLORIDE 5 MG/ML
10 INJECTION INTRAMUSCULAR; INTRAVENOUS EVERY 6 HOURS PRN
Status: DISCONTINUED | OUTPATIENT
Start: 2017-08-03 | End: 2017-08-06 | Stop reason: HOSPADM

## 2017-08-03 RX ORDER — ONDANSETRON 2 MG/ML
4 INJECTION INTRAMUSCULAR; INTRAVENOUS EVERY 6 HOURS PRN
Status: DISCONTINUED | OUTPATIENT
Start: 2017-08-03 | End: 2017-08-06 | Stop reason: HOSPADM

## 2017-08-03 RX ORDER — FENTANYL CITRATE 50 UG/ML
INJECTION, SOLUTION INTRAMUSCULAR; INTRAVENOUS
Status: DISCONTINUED
Start: 2017-08-03 | End: 2017-08-03 | Stop reason: HOSPADM

## 2017-08-03 RX ORDER — IBUPROFEN 400 MG/1
400-800 TABLET, FILM COATED ORAL EVERY 6 HOURS PRN
Status: DISCONTINUED | OUTPATIENT
Start: 2017-08-04 | End: 2017-08-06 | Stop reason: HOSPADM

## 2017-08-03 RX ORDER — ONDANSETRON 2 MG/ML
4 INJECTION INTRAMUSCULAR; INTRAVENOUS EVERY 30 MIN PRN
Status: DISCONTINUED | OUTPATIENT
Start: 2017-08-03 | End: 2017-08-03 | Stop reason: HOSPADM

## 2017-08-03 RX ORDER — PROPOFOL 10 MG/ML
INJECTION, EMULSION INTRAVENOUS PRN
Status: DISCONTINUED | OUTPATIENT
Start: 2017-08-03 | End: 2017-08-03

## 2017-08-03 RX ORDER — CEFAZOLIN SODIUM 2 G/100ML
2 INJECTION, SOLUTION INTRAVENOUS
Status: COMPLETED | OUTPATIENT
Start: 2017-08-03 | End: 2017-08-03

## 2017-08-03 RX ORDER — OXYTOCIN 10 [USP'U]/ML
INJECTION, SOLUTION INTRAMUSCULAR; INTRAVENOUS PRN
Status: DISCONTINUED | OUTPATIENT
Start: 2017-08-03 | End: 2017-08-03

## 2017-08-03 RX ORDER — DIPHENHYDRAMINE HCL 25 MG
25 CAPSULE ORAL EVERY 6 HOURS PRN
Status: DISCONTINUED | OUTPATIENT
Start: 2017-08-03 | End: 2017-08-06 | Stop reason: HOSPADM

## 2017-08-03 RX ORDER — HYDROMORPHONE HYDROCHLORIDE 1 MG/ML
.3-.5 INJECTION, SOLUTION INTRAMUSCULAR; INTRAVENOUS; SUBCUTANEOUS EVERY 30 MIN PRN
Status: DISCONTINUED | OUTPATIENT
Start: 2017-08-03 | End: 2017-08-04 | Stop reason: CLARIF

## 2017-08-03 RX ORDER — LIDOCAINE 40 MG/G
CREAM TOPICAL
Status: DISCONTINUED | OUTPATIENT
Start: 2017-08-03 | End: 2017-08-06 | Stop reason: HOSPADM

## 2017-08-03 RX ORDER — DIPHENHYDRAMINE HCL 25 MG
25 CAPSULE ORAL EVERY 6 HOURS PRN
Status: DISCONTINUED | OUTPATIENT
Start: 2017-08-03 | End: 2017-08-05

## 2017-08-03 RX ORDER — MISOPROSTOL 200 UG/1
400 TABLET ORAL
Status: DISCONTINUED | OUTPATIENT
Start: 2017-08-03 | End: 2017-08-06 | Stop reason: HOSPADM

## 2017-08-03 RX ORDER — HYDROCORTISONE 2.5 %
CREAM (GRAM) TOPICAL 3 TIMES DAILY PRN
Status: DISCONTINUED | OUTPATIENT
Start: 2017-08-03 | End: 2017-08-06 | Stop reason: HOSPADM

## 2017-08-03 RX ORDER — OXYCODONE HYDROCHLORIDE 5 MG/1
5-10 TABLET ORAL
Status: DISCONTINUED | OUTPATIENT
Start: 2017-08-03 | End: 2017-08-06 | Stop reason: HOSPADM

## 2017-08-03 RX ORDER — DEXTROSE, SODIUM CHLORIDE, SODIUM LACTATE, POTASSIUM CHLORIDE, AND CALCIUM CHLORIDE 5; .6; .31; .03; .02 G/100ML; G/100ML; G/100ML; G/100ML; G/100ML
INJECTION, SOLUTION INTRAVENOUS CONTINUOUS
Status: DISCONTINUED | OUTPATIENT
Start: 2017-08-03 | End: 2017-08-06 | Stop reason: HOSPADM

## 2017-08-03 RX ORDER — KETOROLAC TROMETHAMINE 30 MG/ML
INJECTION, SOLUTION INTRAMUSCULAR; INTRAVENOUS PRN
Status: DISCONTINUED | OUTPATIENT
Start: 2017-08-03 | End: 2017-08-03

## 2017-08-03 RX ORDER — OXYTOCIN/0.9 % SODIUM CHLORIDE 30/500 ML
PLASTIC BAG, INJECTION (ML) INTRAVENOUS
Status: DISCONTINUED
Start: 2017-08-03 | End: 2017-08-03 | Stop reason: HOSPADM

## 2017-08-03 RX ORDER — DIPHENHYDRAMINE HYDROCHLORIDE 50 MG/ML
25 INJECTION INTRAMUSCULAR; INTRAVENOUS EVERY 6 HOURS PRN
Status: DISCONTINUED | OUTPATIENT
Start: 2017-08-03 | End: 2017-08-06 | Stop reason: HOSPADM

## 2017-08-03 RX ORDER — SODIUM CHLORIDE, SODIUM LACTATE, POTASSIUM CHLORIDE, CALCIUM CHLORIDE 600; 310; 30; 20 MG/100ML; MG/100ML; MG/100ML; MG/100ML
INJECTION, SOLUTION INTRAVENOUS CONTINUOUS
Status: DISCONTINUED | OUTPATIENT
Start: 2017-08-03 | End: 2017-08-03 | Stop reason: HOSPADM

## 2017-08-03 RX ORDER — NALOXONE HYDROCHLORIDE 0.4 MG/ML
.1-.4 INJECTION, SOLUTION INTRAMUSCULAR; INTRAVENOUS; SUBCUTANEOUS
Status: DISCONTINUED | OUTPATIENT
Start: 2017-08-03 | End: 2017-08-05

## 2017-08-03 RX ORDER — METOCLOPRAMIDE 10 MG/1
10 TABLET ORAL EVERY 6 HOURS PRN
Status: DISCONTINUED | OUTPATIENT
Start: 2017-08-03 | End: 2017-08-06 | Stop reason: HOSPADM

## 2017-08-03 RX ORDER — KETOROLAC TROMETHAMINE 30 MG/ML
INJECTION, SOLUTION INTRAMUSCULAR; INTRAVENOUS
Status: DISCONTINUED
Start: 2017-08-03 | End: 2017-08-03 | Stop reason: HOSPADM

## 2017-08-03 RX ORDER — NALOXONE HYDROCHLORIDE 0.4 MG/ML
.1-.4 INJECTION, SOLUTION INTRAMUSCULAR; INTRAVENOUS; SUBCUTANEOUS
Status: DISCONTINUED | OUTPATIENT
Start: 2017-08-03 | End: 2017-08-06

## 2017-08-03 RX ORDER — ONDANSETRON 4 MG/1
4 TABLET, ORALLY DISINTEGRATING ORAL EVERY 6 HOURS PRN
Status: DISCONTINUED | OUTPATIENT
Start: 2017-08-03 | End: 2017-08-06 | Stop reason: HOSPADM

## 2017-08-03 RX ORDER — OXYTOCIN/0.9 % SODIUM CHLORIDE 30/500 ML
PLASTIC BAG, INJECTION (ML) INTRAVENOUS CONTINUOUS PRN
Status: DISCONTINUED | OUTPATIENT
Start: 2017-08-03 | End: 2017-08-03

## 2017-08-03 RX ORDER — ONDANSETRON 2 MG/ML
4 INJECTION INTRAMUSCULAR; INTRAVENOUS EVERY 6 HOURS PRN
Status: DISCONTINUED | OUTPATIENT
Start: 2017-08-03 | End: 2017-08-05

## 2017-08-03 RX ORDER — OXYTOCIN/0.9 % SODIUM CHLORIDE 30/500 ML
340 PLASTIC BAG, INJECTION (ML) INTRAVENOUS CONTINUOUS PRN
Status: DISCONTINUED | OUTPATIENT
Start: 2017-08-03 | End: 2017-08-06 | Stop reason: HOSPADM

## 2017-08-03 RX ORDER — METHYLERGONOVINE MALEATE 0.2 MG/ML
INJECTION INTRAVENOUS PRN
Status: DISCONTINUED | OUTPATIENT
Start: 2017-08-03 | End: 2017-08-03

## 2017-08-03 RX ORDER — SODIUM CHLORIDE, SODIUM LACTATE, POTASSIUM CHLORIDE, CALCIUM CHLORIDE 600; 310; 30; 20 MG/100ML; MG/100ML; MG/100ML; MG/100ML
INJECTION, SOLUTION INTRAVENOUS CONTINUOUS
Status: DISCONTINUED | OUTPATIENT
Start: 2017-08-03 | End: 2017-08-03 | Stop reason: CLARIF

## 2017-08-03 RX ORDER — KETOROLAC TROMETHAMINE 30 MG/ML
30 INJECTION, SOLUTION INTRAMUSCULAR; INTRAVENOUS EVERY 6 HOURS
Status: COMPLETED | OUTPATIENT
Start: 2017-08-03 | End: 2017-08-04

## 2017-08-03 RX ORDER — KETOROLAC TROMETHAMINE 30 MG/ML
30 INJECTION, SOLUTION INTRAMUSCULAR; INTRAVENOUS
Status: DISCONTINUED | OUTPATIENT
Start: 2017-08-03 | End: 2017-08-03 | Stop reason: HOSPADM

## 2017-08-03 RX ORDER — HYDROMORPHONE HYDROCHLORIDE 1 MG/ML
.3-.5 INJECTION, SOLUTION INTRAMUSCULAR; INTRAVENOUS; SUBCUTANEOUS EVERY 5 MIN PRN
Status: DISCONTINUED | OUTPATIENT
Start: 2017-08-03 | End: 2017-08-03 | Stop reason: HOSPADM

## 2017-08-03 RX ORDER — LABETALOL HYDROCHLORIDE 5 MG/ML
10 INJECTION, SOLUTION INTRAVENOUS
Status: DISCONTINUED | OUTPATIENT
Start: 2017-08-03 | End: 2017-08-03 | Stop reason: HOSPADM

## 2017-08-03 RX ORDER — DIPHENHYDRAMINE HYDROCHLORIDE 50 MG/ML
25 INJECTION INTRAMUSCULAR; INTRAVENOUS EVERY 6 HOURS PRN
Status: DISCONTINUED | OUTPATIENT
Start: 2017-08-03 | End: 2017-08-05

## 2017-08-03 RX ORDER — HYDROXYZINE HYDROCHLORIDE 25 MG/1
25 TABLET, FILM COATED ORAL EVERY 6 HOURS PRN
Status: DISCONTINUED | OUTPATIENT
Start: 2017-08-03 | End: 2017-08-06 | Stop reason: HOSPADM

## 2017-08-03 RX ORDER — LANOLIN 100 %
OINTMENT (GRAM) TOPICAL
Status: DISCONTINUED | OUTPATIENT
Start: 2017-08-03 | End: 2017-08-06 | Stop reason: HOSPADM

## 2017-08-03 RX ORDER — METOCLOPRAMIDE HYDROCHLORIDE 5 MG/ML
10 INJECTION INTRAMUSCULAR; INTRAVENOUS EVERY 6 HOURS PRN
Status: DISCONTINUED | OUTPATIENT
Start: 2017-08-03 | End: 2017-08-05

## 2017-08-03 RX ORDER — PROCHLORPERAZINE 25 MG
25 SUPPOSITORY, RECTAL RECTAL EVERY 12 HOURS PRN
Status: DISCONTINUED | OUTPATIENT
Start: 2017-08-03 | End: 2017-08-05

## 2017-08-03 RX ORDER — OXYTOCIN/0.9 % SODIUM CHLORIDE 30/500 ML
100 PLASTIC BAG, INJECTION (ML) INTRAVENOUS CONTINUOUS
Status: DISCONTINUED | OUTPATIENT
Start: 2017-08-03 | End: 2017-08-06 | Stop reason: HOSPADM

## 2017-08-03 RX ORDER — ONDANSETRON 4 MG/1
4 TABLET, ORALLY DISINTEGRATING ORAL EVERY 6 HOURS PRN
Status: DISCONTINUED | OUTPATIENT
Start: 2017-08-03 | End: 2017-08-05

## 2017-08-03 RX ORDER — AMOXICILLIN 250 MG
1-2 CAPSULE ORAL 2 TIMES DAILY
Status: DISCONTINUED | OUTPATIENT
Start: 2017-08-03 | End: 2017-08-06 | Stop reason: HOSPADM

## 2017-08-03 RX ORDER — FENTANYL CITRATE 50 UG/ML
INJECTION, SOLUTION INTRAMUSCULAR; INTRAVENOUS PRN
Status: DISCONTINUED | OUTPATIENT
Start: 2017-08-03 | End: 2017-08-03

## 2017-08-03 RX ORDER — DEXAMETHASONE SODIUM PHOSPHATE 4 MG/ML
INJECTION, SOLUTION INTRA-ARTICULAR; INTRALESIONAL; INTRAMUSCULAR; INTRAVENOUS; SOFT TISSUE PRN
Status: DISCONTINUED | OUTPATIENT
Start: 2017-08-03 | End: 2017-08-03

## 2017-08-03 RX ORDER — ACETAMINOPHEN 325 MG/1
975 TABLET ORAL EVERY 8 HOURS
Status: DISCONTINUED | OUTPATIENT
Start: 2017-08-04 | End: 2017-08-06 | Stop reason: HOSPADM

## 2017-08-03 RX ORDER — NALOXONE HYDROCHLORIDE 0.4 MG/ML
.1-.4 INJECTION, SOLUTION INTRAMUSCULAR; INTRAVENOUS; SUBCUTANEOUS
Status: DISCONTINUED | OUTPATIENT
Start: 2017-08-03 | End: 2017-08-06 | Stop reason: HOSPADM

## 2017-08-03 RX ORDER — NALOXONE HYDROCHLORIDE 0.4 MG/ML
INJECTION, SOLUTION INTRAMUSCULAR; INTRAVENOUS; SUBCUTANEOUS
Status: DISCONTINUED
Start: 2017-08-03 | End: 2017-08-03 | Stop reason: WASHOUT

## 2017-08-03 RX ORDER — FENTANYL CITRATE 50 UG/ML
25-50 INJECTION, SOLUTION INTRAMUSCULAR; INTRAVENOUS
Status: DISCONTINUED | OUTPATIENT
Start: 2017-08-03 | End: 2017-08-03 | Stop reason: HOSPADM

## 2017-08-03 RX ORDER — BISACODYL 10 MG
10 SUPPOSITORY, RECTAL RECTAL DAILY PRN
Status: DISCONTINUED | OUTPATIENT
Start: 2017-08-05 | End: 2017-08-06 | Stop reason: HOSPADM

## 2017-08-03 RX ADMIN — FENTANYL CITRATE 50 MCG: 50 INJECTION, SOLUTION INTRAMUSCULAR; INTRAVENOUS at 14:58

## 2017-08-03 RX ADMIN — OXYTOCIN-SODIUM CHLORIDE 0.9% IV SOLN 30 UNIT/500ML 100 ML/HR: 30-0.9/5 SOLUTION at 18:42

## 2017-08-03 RX ADMIN — FENTANYL CITRATE 50 MCG: 50 INJECTION, SOLUTION INTRAMUSCULAR; INTRAVENOUS at 14:48

## 2017-08-03 RX ADMIN — SODIUM CITRATE AND CITRIC ACID MONOHYDRATE 30 ML: 500; 334 SOLUTION ORAL at 13:53

## 2017-08-03 RX ADMIN — FENTANYL CITRATE 100 MCG: 50 INJECTION, SOLUTION INTRAMUSCULAR; INTRAVENOUS at 03:39

## 2017-08-03 RX ADMIN — FENTANYL CITRATE 50 MCG: 50 INJECTION, SOLUTION INTRAMUSCULAR; INTRAVENOUS at 14:22

## 2017-08-03 RX ADMIN — OXYTOCIN 10 UNITS: 10 INJECTION, SOLUTION INTRAMUSCULAR; INTRAVENOUS at 14:23

## 2017-08-03 RX ADMIN — FENTANYL CITRATE 100 MCG: 50 INJECTION, SOLUTION INTRAMUSCULAR; INTRAVENOUS at 11:03

## 2017-08-03 RX ADMIN — FENTANYL CITRATE 100 MCG: 50 INJECTION, SOLUTION INTRAMUSCULAR; INTRAVENOUS at 12:16

## 2017-08-03 RX ADMIN — FENTANYL CITRATE 50 MCG: 50 INJECTION, SOLUTION INTRAMUSCULAR; INTRAVENOUS at 01:52

## 2017-08-03 RX ADMIN — FENTANYL CITRATE 50 MCG: 50 INJECTION, SOLUTION INTRAMUSCULAR; INTRAVENOUS at 15:22

## 2017-08-03 RX ADMIN — FENTANYL CITRATE 100 MCG: 50 INJECTION, SOLUTION INTRAMUSCULAR; INTRAVENOUS at 06:32

## 2017-08-03 RX ADMIN — SODIUM CHLORIDE, POTASSIUM CHLORIDE, SODIUM LACTATE AND CALCIUM CHLORIDE: 600; 310; 30; 20 INJECTION, SOLUTION INTRAVENOUS at 14:16

## 2017-08-03 RX ADMIN — Medication 370 ML/HR: at 14:22

## 2017-08-03 RX ADMIN — FENTANYL CITRATE 100 MCG: 50 INJECTION, SOLUTION INTRAMUSCULAR; INTRAVENOUS at 07:47

## 2017-08-03 RX ADMIN — ONDANSETRON 4 MG: 2 INJECTION INTRAMUSCULAR; INTRAVENOUS at 14:22

## 2017-08-03 RX ADMIN — FENTANYL CITRATE 100 MCG: 50 INJECTION, SOLUTION INTRAMUSCULAR; INTRAVENOUS at 05:04

## 2017-08-03 RX ADMIN — HYDROMORPHONE HYDROCHLORIDE 0.5 MG: 1 INJECTION, SOLUTION INTRAMUSCULAR; INTRAVENOUS; SUBCUTANEOUS at 16:25

## 2017-08-03 RX ADMIN — FENTANYL CITRATE 100 MCG: 50 INJECTION, SOLUTION INTRAMUSCULAR; INTRAVENOUS at 08:54

## 2017-08-03 RX ADMIN — DEXAMETHASONE SODIUM PHOSPHATE 4 MG: 4 INJECTION, SOLUTION INTRA-ARTICULAR; INTRALESIONAL; INTRAMUSCULAR; INTRAVENOUS; SOFT TISSUE at 14:59

## 2017-08-03 RX ADMIN — METHYLERGONOVINE MALEATE 200 MCG: 0.2 INJECTION, SOLUTION INTRAMUSCULAR; INTRAVENOUS at 14:27

## 2017-08-03 RX ADMIN — Medication: at 17:16

## 2017-08-03 RX ADMIN — HYDROMORPHONE HYDROCHLORIDE 0.5 MG: 1 INJECTION, SOLUTION INTRAMUSCULAR; INTRAVENOUS; SUBCUTANEOUS at 15:10

## 2017-08-03 RX ADMIN — KETOROLAC TROMETHAMINE 30 MG: 30 INJECTION, SOLUTION INTRAMUSCULAR at 20:58

## 2017-08-03 RX ADMIN — FENTANYL CITRATE 100 MCG: 50 INJECTION, SOLUTION INTRAMUSCULAR; INTRAVENOUS at 10:05

## 2017-08-03 RX ADMIN — ACETAMINOPHEN 975 MG: 325 TABLET, FILM COATED ORAL at 23:52

## 2017-08-03 RX ADMIN — PROPOFOL 170 MG: 10 INJECTION, EMULSION INTRAVENOUS at 14:18

## 2017-08-03 RX ADMIN — FENTANYL CITRATE 50 MCG: 50 INJECTION, SOLUTION INTRAMUSCULAR; INTRAVENOUS at 14:15

## 2017-08-03 RX ADMIN — SUCCINYLCHOLINE CHLORIDE 80 MG: 20 INJECTION, SOLUTION INTRAMUSCULAR; INTRAVENOUS at 14:18

## 2017-08-03 RX ADMIN — KETOROLAC TROMETHAMINE 30 MG: 30 INJECTION, SOLUTION INTRAMUSCULAR at 14:46

## 2017-08-03 RX ADMIN — SODIUM CHLORIDE, POTASSIUM CHLORIDE, SODIUM LACTATE AND CALCIUM CHLORIDE: 600; 310; 30; 20 INJECTION, SOLUTION INTRAVENOUS at 07:54

## 2017-08-03 RX ADMIN — CEFAZOLIN SODIUM 2 G: 2 INJECTION, SOLUTION INTRAVENOUS at 14:05

## 2017-08-03 RX ADMIN — OXYTOCIN 10 UNITS: 10 INJECTION, SOLUTION INTRAMUSCULAR; INTRAVENOUS at 14:26

## 2017-08-03 NOTE — ANESTHESIA CARE TRANSFER NOTE
Patient: Rima Irving    Procedure(s):   - Wound Class: II-Clean Contaminated    Diagnosis: pregnancy  Diagnosis Additional Information: No value filed.    Anesthesia Type:   General, ETT, RSI     Note:  Airway :Face Mask  Patient transferred to:PACU  Comments: Neuromuscular blockade not used after succinylcholine for intubation, spontaneous return of TOF 4/4 with sustained tetany, spontaneous respirations, adequate tidal volumes, followed commands to voice, extubated atraumatically, extubated with suction, airway patent after extubation.  Oxygen via facemask at 6 liters per minute to PACU. Oxygen tubing connected to wall O2 in PACU, SpO2, NiBP, and EKG monitors and alarms on and functioning, Elsi Hugger warmer connected to patient gown, report on patient's clinical status given to PACU RN, RN questions answered.       Vitals: (Last set prior to Anesthesia Care Transfer)    CRNA VITALS  8/3/2017 1418 - 8/3/2017 1503      8/3/2017             Resp Rate (set): 10                Electronically Signed By: EVELYN Maddox CRNA  August 3, 2017  3:00 PM

## 2017-08-03 NOTE — ANESTHESIA POSTPROCEDURE EVALUATION
Patient: Rima Irving    Procedure(s):   - Wound Class: II-Clean Contaminated    Diagnosis:pregnancy  Diagnosis Additional Information: No value filed.    Anesthesia Type:  General, ETT, RSI    Note:  Anesthesia Post Evaluation    Patient location during evaluation: PACU  Patient participation: Able to fully participate in evaluation  Level of consciousness: awake  Pain management: adequate  Airway patency: patent  Cardiovascular status: acceptable  Respiratory status: acceptable  Hydration status: acceptable  PONV: controlled     Anesthetic complications: None          Last vitals:  Vitals:    08/03/17 1530 08/03/17 1544 08/03/17 1600   BP: 121/82  117/70   Pulse:      Resp: 16  16   Temp: 37.4  C (99.3  F)     SpO2: 99% 99%          Electronically Signed By: Landon Butler MD  August 3, 2017  4:14 PM

## 2017-08-03 NOTE — OR NURSING
MERLE Cordero agrees with all assessments and charted documentation made by SN Vicki Morris in this patient's chart.

## 2017-08-03 NOTE — PROVIDER NOTIFICATION
08/03/17 0400   Provider Notification   Provider Name/Title Dr. Mendoza   Method of Notification Electronic Page   Request Evaluate - Remote   Notification Reason Membrane Status;Labor Status;Uterine Activity;Pain;SVE

## 2017-08-03 NOTE — PLAN OF CARE
Dr. Mendoza in house, updated on patient arrival, SVE, fetal and uterine monitoring. MD to bedside to ultrasound for fetal presentation. Orders entered for intrapartum admission.

## 2017-08-03 NOTE — PLAN OF CARE
"Patient and  educated on pitocin augmentation, rate of increase prn, increasing strength of contractions to encourage cervical change.  Pt's  states that she \"has a low pain tolerance\" and a slow increase in pain is better than the fast increase that pitocin would cause.  Patient verbally agrees that she wishes to proceed as she currently is and declines pitocin at this time.    "

## 2017-08-03 NOTE — PROGRESS NOTES
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   Father of baby states he understands and agrees.   He wishes to avoid Vit K injection.

## 2017-08-03 NOTE — PLAN OF CARE
2320: SBAR report received from Diane HANNON RN. Will assume all cares from this time.  0150: Patient requesting pain medication. SVE 4/80/-1. Fentanyl 50 mcg given IV push. Patient repositioned to left lateral position.  0330: SROM clear fluid. SVE 4/90/0. UTCs every 2-3 minutes. Patient requesting Fentanyl.  0339: Fentanyl 100 mcg given IV push  0400: Dr. Mendoza updated via electronic page.  0500: Patient requesting Fentanyl. SVE 4/90/0.  0504: Fentanyl 100 mcg IV push.  0600: Patient sleeping during contractions.  0630: Patient requesting Fentanyl. SVE 5/90/0.  0632: Fentanyl 100mcg IV push.  0700: Dr. Arizmendi updated regarding labor status, SVE, and pain management.  0720: SBAR report to Steve MERIDA RN and Sanjuana LEHMAN RN to assume all cares from this time.

## 2017-08-03 NOTE — PLAN OF CARE
Doctor discussed rationale for primary  due to failure to progress. Patient consented to , consent obtained by physician, witnessed by RN. Patient prepared patient to go to OR for  under general anesthesia due to plaque psoriasis (compression  Boots, hat, anderson cloth) . Nurse accompanied patient to OR at 1400.

## 2017-08-03 NOTE — H&P
No significant change in general health status based on examination of the patient, review of Nursing Admission Database and prenatal record. Patient hasn't tolerated a cervix check before this so no baseline for comparison. Started allen last night and then increased around 630am today. Poor historian so can't tell an exact time when became regular or painful enough to present. Ctx are 3 min apart and cvx is 3cm.  Patient had known breech with planned c/s on 7/20 but then baby spontaneously flipped on own so c/s was cancelled. BSUS just done and confirmed head down.  Now admitting in active labor. Would like epidural.    EFW: 7lb 8oz

## 2017-08-03 NOTE — PROVIDER NOTIFICATION
08/03/17 0700   Provider Notification   Provider Name/Title Dr. Arizmendi   Method of Notification Phone   Request Evaluate - Remote   Notification Reason Labor Status;Uterine Activity;SVE;Status Update

## 2017-08-03 NOTE — BRIEF OP NOTE
[facility] Brief Operative Note    Pre-operative diagnosis: Pregnancy   Failure to progress   Post-operative diagnosis * No post-op diagnosis entered *  same   Procedure: Procedure(s):   - Wound Class: II-Clean Contaminated   Surgeon(s): Surgeon(s) and Role:     * Alivia Arizmendi MD - Primary   Estimated blood loss: 700 mL    Specimens:   ID Type Source Tests Collected by Time Destination   1 :  Cord blood Blood OR DOCUMENTATION ONLY Opal Weems RN 8/3/2017  2:21 PM    2 :  Cord blood, arterial Blood OR DOCUMENTATION ONLY Opal Weems RN 8/3/2017  2:28 PM    3 :  Cord blood, venous Blood OR DOCUMENTATION ONLY Opal Weems RN 8/3/2017  2:28 PM       Findings: 8-8   9/9 apgars   Clear fluid      General anesthesia due to plaque psoriasis on lumbar back

## 2017-08-03 NOTE — OP NOTE
DATE OF DELIVERY:  2017.      PREOPERATIVE DIAGNOSES:  Failure to progress, inactive labor.      POSTOPERATIVE DIAGNOSES:  Failure to progress, inactive labor.      PROCEDURE:  Primary low transverse  section.      SURGEON:  Barbara Arizmendi MD      ESTIMATED BLOOD LOSS:  700 mL.      SPECIMENS:  Cord gases.      FINDINGS:  Infant weighed 8 pounds 8 ounces, Apgars were 9 and 9, clear fluid.  General anesthesia was done due to severe plaque psoriasis that was present on the lumbar portion of her lower back which precluded regional anesthesia.      TECHNIQUE:  Anuj Irving was taken to the operating room and placed in supine position.  Anesthesia was administered after we were prepped/draped and a timeout had been carried out and a Griffiths inserted.  We entered the abdomen rapidly in layers and entered the peritoneum bluntly and then we took the bladder flap down.  Low transverse incision was made in the uterus.  The baby was delivered without difficulty.  Cord gases were obtained.  Cord blood was obtained.  The placenta was delivered manually and remnants of membranes were removed with a ring forceps.  Uterus exteriorized.  It was closed in 2 layers with 0 Monocryl.  We had good hemostasis.  Tubes and ovaries looked normal.  Uterus returned to the abdominal cavity.  The patient had some mild atony at the time of the delivery which was probably related to her longer labor and the general anesthetic.  She was treated with IV Pitocin and Methergine.  Her estimated blood loss during the case was 700 mL.  The clots that were in the abdominal cavity were removed and the fascia was closed with 0 Vicryl and the skin was closed with 4-0 Vicryl in subcuticular fashion.  Steri-Strips and dressings were applied and the patient went to the recovery room in stable condition.         BARBARA ARIZMENDI MD             D: 2017 15:03   T: 2017 15:54   MT: TS      Name:     ANUJ IRVING   MRN:      [id number]         Account:        [id number]   :      1995           Procedure Date: 2017      Document: [id number]       cc: Alivia Arizmendi MD

## 2017-08-03 NOTE — PLAN OF CARE
"Patient's  stated \"she is afraid pitocin will put her in more pain.\" Educated patient and  about pitocin augmentation, what medication is used for, rate of increase, and progression of labor. Patient's  thanked for information, stating will \"think about it.\" Patient acknowledged agreement with plan.   "

## 2017-08-03 NOTE — ANESTHESIA PREPROCEDURE EVALUATION
Anesthesia Evaluation     . Pt has had prior anesthetic.     No history of anesthetic complications          ROS/MED HX    ENT/Pulmonary:      (-) sleep apnea   Neurologic:       Cardiovascular:         METS/Exercise Tolerance:     Hematologic:     (+) Other Hematologic Disorder-plague psoriasis      Musculoskeletal:         GI/Hepatic:        (-) GERD   Renal/Genitourinary:         Endo:         Psychiatric:     (+) psychiatric history (prior substance abuse) anxiety and depression      Infectious Disease:         Malignancy:         Other:                     Physical Exam  Normal systems: cardiovascular, pulmonary and dental    Airway   Mallampati: II  TM distance: >3 FB  Neck ROM: full    Dental     Cardiovascular       Pulmonary                     Anesthesia Plan      History & Physical Review  History and physical reviewed and following examination; no interval change.    ASA Status:  2 .    NPO Status:  > 8 hours    Plan for General, ETT and RSI with Intravenous induction. Maintenance will be Balanced.    PONV prophylaxis:  Ondansetron (or other 5HT-3)  GA given large plague psoriasis on back, and patient preference      Postoperative Care  Postoperative pain management:  IV analgesics.      Consents  Anesthetic plan, risks, benefits and alternatives discussed with:  Patient and Spouse..                          .

## 2017-08-03 NOTE — PLAN OF CARE
presents to maternal assessment center for evaluation of labor. Denies leaking of fluid and vaginal bleeding. Reports positive fetal movement. External fetal and uterine monitors applied with patient consent.  Triage questions answered.

## 2017-08-03 NOTE — PROGRESS NOTES
After anesthesia reevaluated her and confirmed that regional anesthesia was too risky given plaques in the area of placement and risk of infection. However c/s is higher risk too given plaques in area of incision placement and needing to go above the plaques. Patient was hoping to have pain medication so hasn't planned for unmedicated although really doesn't want c/s and feels like she will try with IV pain meds now and walking, birthing ball, tub and nitrous and get through it. Support person reports that he has a very hard time watching her in pain and may pass out. Encouraged him to support her and we will help them through the process to try to do this unmedicated as c/s would need general anethesia.  cvx recheked and 3/80/-1 with NISHANT  Will plan fentanyl now

## 2017-08-03 NOTE — PROGRESS NOTES
Called to evaluate patient for possible epidural placement.      Chart reviewed.  Patient scheduled for  2017 for breech presentation.  The plan was was for GETA secondary to plaque psoriasis in lumbar spine region.  The  was cancelled as baby flipped to head down position.      Patient with extensive systemic  plaque psoriasis( face, arms, chest, back(lumbar region)). Patient reports psoriasis has gotten worse with pregnancy.  Patient has extensive plaque psoriasis in lumbar area.  Discussed increased risk of infection and or neurologic injury - skin/ epidural abscess if needle passed through  Plaque.  If this developed patient may require surgical intervention.    Discussed with patient and significant other the risks the placing the epidural needle through a plaque and they appear to understand.      An epidural catheter will not be placed secondary to increased risk.

## 2017-08-03 NOTE — PROGRESS NOTES
Labor Progress Note    S: using fentanyl for pain  With some relief, uanble to have epidural due to large plaque over lumbar area and infection risk from this    O:   Vitals:    08/03/17 0200 08/03/17 0339 08/03/17 0504 08/03/17 0630   BP: 125/67 118/65 131/71 136/90   Pulse: 87 82 80 69   Resp:  20 20 20   Temp:  97.7  F (36.5  C) 97.9  F (36.6  C) 97.6  F (36.4  C)   TempSrc:  Temporal Temporal Temporal     /90  Pulse 69  Temp 97.6  F (36.4  C) (Temporal)  Resp 20  LMP 10/24/2016 (Exact Date)    Gen: AOx3, NAD    SVE: 5/90/-1    FHT: cat 1  [place]: q 2-3 minute, moderate    Pitocin: none     A/P: active labor, srom during the night, declines pitocin augmentation so far, no progress since last exam at 6:30    Discussed need to augment  Wants to wait another hr  Discussed poss need for c section if arrest of active phase persists      Alivia Arizmendi MD  August 3, 2017  7:57 AM

## 2017-08-04 LAB — HGB BLD-MCNC: 8.8 G/DL (ref 11.7–15.7)

## 2017-08-04 PROCEDURE — 36415 COLL VENOUS BLD VENIPUNCTURE: CPT | Performed by: OBSTETRICS & GYNECOLOGY

## 2017-08-04 PROCEDURE — 85018 HEMOGLOBIN: CPT | Performed by: OBSTETRICS & GYNECOLOGY

## 2017-08-04 PROCEDURE — 25000132 ZZH RX MED GY IP 250 OP 250 PS 637: Performed by: OBSTETRICS & GYNECOLOGY

## 2017-08-04 PROCEDURE — 12000037 ZZH R&B POSTPARTUM INTERMEDIATE

## 2017-08-04 PROCEDURE — 25000128 H RX IP 250 OP 636: Performed by: OBSTETRICS & GYNECOLOGY

## 2017-08-04 RX ADMIN — SENNOSIDES AND DOCUSATE SODIUM 1 TABLET: 8.6; 5 TABLET ORAL at 08:05

## 2017-08-04 RX ADMIN — OXYCODONE HYDROCHLORIDE 5 MG: 5 TABLET ORAL at 08:04

## 2017-08-04 RX ADMIN — Medication: at 06:01

## 2017-08-04 RX ADMIN — ACETAMINOPHEN 975 MG: 325 TABLET, FILM COATED ORAL at 08:04

## 2017-08-04 RX ADMIN — IBUPROFEN 800 MG: 400 TABLET ORAL at 20:28

## 2017-08-04 RX ADMIN — KETOROLAC TROMETHAMINE 30 MG: 30 INJECTION, SOLUTION INTRAMUSCULAR at 09:03

## 2017-08-04 RX ADMIN — SENNOSIDES AND DOCUSATE SODIUM 2 TABLET: 8.6; 5 TABLET ORAL at 20:27

## 2017-08-04 RX ADMIN — ACETAMINOPHEN 975 MG: 325 TABLET, FILM COATED ORAL at 15:56

## 2017-08-04 RX ADMIN — Medication: at 02:52

## 2017-08-04 RX ADMIN — OXYCODONE HYDROCHLORIDE 10 MG: 5 TABLET ORAL at 10:54

## 2017-08-04 RX ADMIN — OXYCODONE HYDROCHLORIDE 10 MG: 5 TABLET ORAL at 14:02

## 2017-08-04 RX ADMIN — OXYCODONE HYDROCHLORIDE 10 MG: 5 TABLET ORAL at 20:28

## 2017-08-04 RX ADMIN — KETOROLAC TROMETHAMINE 30 MG: 30 INJECTION, SOLUTION INTRAMUSCULAR at 14:47

## 2017-08-04 RX ADMIN — SODIUM CHLORIDE, SODIUM LACTATE, POTASSIUM CHLORIDE, CALCIUM CHLORIDE AND DEXTROSE MONOHYDRATE: 5; 600; 310; 30; 20 INJECTION, SOLUTION INTRAVENOUS at 00:08

## 2017-08-04 RX ADMIN — OXYCODONE HYDROCHLORIDE 10 MG: 5 TABLET ORAL at 17:17

## 2017-08-04 RX ADMIN — OXYCODONE HYDROCHLORIDE 10 MG: 5 TABLET ORAL at 23:35

## 2017-08-04 RX ADMIN — ACETAMINOPHEN 975 MG: 325 TABLET, FILM COATED ORAL at 23:35

## 2017-08-04 RX ADMIN — KETOROLAC TROMETHAMINE 30 MG: 30 INJECTION, SOLUTION INTRAMUSCULAR at 02:49

## 2017-08-04 NOTE — PLAN OF CARE
Problem: Goal Outcome Summary  Goal: Goal Outcome Summary  Outcome: Improving  Vitals stable. Pt is having good pain control with PCA pump, and scheduled Toradol and tylenol. Griffiths is patent. Pt is drowsy. SCD's in place. Second bag of oxytocin is infusing. Pt is bonding well with baby. Started pumping. Continue with plan of care.

## 2017-08-04 NOTE — PLAN OF CARE
Problem: Goal Outcome Summary  Goal: Goal Outcome Summary  Outcome: Improving  Vss, fundus firm with scant flow. Dressing intact. Pain managed with 10 mg oxycodone, ibupofen and tylenol. Wearing abdominal binder for comfort. Griffiths removed at 1300, DTV. Working on breast feeding  (improving), has been pumping if not latching. Plaque psoriasis, states it does not bother her. Declines TDAP.

## 2017-08-04 NOTE — PLAN OF CARE
Pt. admitted from L&D via bed.  Report was taken from [place] in L&D.  Pt. is A&Ox3 and VSS on O2. Fundus is firm and midline.  Vaginal bleeding is small.   Pt. c/o 6/10 pain. Pt. denied  nausea, CP, SOB, lightheadedness, or dizziness. LS clear and BS heard. PIV patent and infusing.  Griffiths patent and draining.  Dressing to lower abdomen CDI.  Pneumoboots in place to BLE.  Pt. oriented to the room and call light system.

## 2017-08-04 NOTE — PROGRESS NOTES
[facility]    Obstetrics Post-Op / Progress Note    Assessment & Plan   Assessment:  -1 Day Post-Op  Procedure(s):   - Wound Class: II-Clean Contaminated    Doing well.  No immediate surgical complications identified.  No excessive bleeding  Pain well-controlled.    Plan:  Ambulation encouraged once mackay out  Hemoglobin in AM  Mackay out later this AM  Transition to oral pain meds  Lactation consultation  Monitor wound for signs of infection  Pain control measures as needed  Reportable signs and symptoms dicussed with the patient    Asha Vásquez     Interval History   Doing well.  Pain is fairly well-controlled with PCA --will transition to oral meds today.  No fevers.  No history of wound drainage, warmth or significant erythema.  Good appetite --no n/v with oral intake.  Denies chest pain, shortness of breath, nausea or vomiting.  Ambulatory but having more pain with activity.  Breastfeeding --working on latch.    Medications     oxytocin in 0.9% NaCl 100 mL/hr (08/03/17 1842)     dextrose 5% lactated ringers Stopped (08/04/17 0800)     oxytocin in 0.9% NaCl       NO Rho (D) immune globulin (RhoGam) needed - mother Rh POSITIVE       - MEDICATION INSTRUCTIONS -         sodium chloride (PF)  3 mL Intracatheter Q8H     senna-docusate  1-2 tablet Oral BID     acetaminophen  975 mg Oral Q8H     ketorolac  30 mg Intravenous Q6H     Tdap (tetanus-diphtheria-acell pertussis)  0.5 mL Intramuscular Once     HYDROmorphone   Intravenous PCA     Tdap (tetanus-diphtheria-acell pertussis)  0.5 mL Intramuscular Once       Physical Exam   Temp: 98.6  F (37  C) Temp src: Oral BP: 99/57 Pulse: 68 Heart Rate: 80 Resp: 16 SpO2: 99 % O2 Device: Nasal cannula Oxygen Delivery: 4 LPM  There were no vitals filed for this visit.  Vital Signs with Ranges  Temp:  [97.6  F (36.4  C)-100.2  F (37.9  C)] 98.6  F (37  C)  Pulse:  [68-96] 68  Heart Rate:  [62-96] 80  Resp:  [16-18] 16  BP: ()/() 99/57  SpO2:  [98  %-100 %] 99 %  I/O last 3 completed shifts:  In: 2025 [P.O.:225; I.V.:1800]  Out: 2625 [Urine:1925; Blood:700]    Uterine fundus is firm, non-tender and at the level of the umbilicus  Incision C/D/I  Extremities Non-tender    Data   Recent Labs   Lab Test  08/02/17 2110   ABO  O   RH   Pos   AS  Neg     Recent Labs   Lab Test  08/02/17 2110 07/20/17   1149   HGB  10.6*  10.7*     Recent Labs   Lab Test  12/26/16   0922   RUQIGG  11

## 2017-08-04 NOTE — PLAN OF CARE
Problem: Goal Outcome Summary  Goal: Goal Outcome Summary  Outcome: No Change  Data: Vital signs within normal limits. Postpartum checks within normal limits - see flow record. Patient eating and drinking normally. Patient unable to empty bladder independently and is up ambulating SBA. No apparent signs of infection. incision dsg CDI. Patient performing self cares and is able to care for infant.  Action: Patient medicated during the shift for pain. See MAR. Patient reassessed within 1 hour after each medication and pain was improved - patient stated she was comfortable. Patient education done. See flow record.  Response: Positive attachment behaviors observed with infant. Support persons is present.   Plan: Anticipate discharge on 8/6.

## 2017-08-05 PROCEDURE — 12000037 ZZH R&B POSTPARTUM INTERMEDIATE

## 2017-08-05 PROCEDURE — 25000132 ZZH RX MED GY IP 250 OP 250 PS 637: Performed by: OBSTETRICS & GYNECOLOGY

## 2017-08-05 RX ADMIN — IBUPROFEN 800 MG: 400 TABLET ORAL at 02:53

## 2017-08-05 RX ADMIN — ACETAMINOPHEN 975 MG: 325 TABLET, FILM COATED ORAL at 17:52

## 2017-08-05 RX ADMIN — IBUPROFEN 800 MG: 400 TABLET ORAL at 15:38

## 2017-08-05 RX ADMIN — OXYCODONE HYDROCHLORIDE 10 MG: 5 TABLET ORAL at 02:53

## 2017-08-05 RX ADMIN — ACETAMINOPHEN 975 MG: 325 TABLET, FILM COATED ORAL at 09:29

## 2017-08-05 RX ADMIN — OXYCODONE HYDROCHLORIDE 10 MG: 5 TABLET ORAL at 21:13

## 2017-08-05 RX ADMIN — OXYCODONE HYDROCHLORIDE 10 MG: 5 TABLET ORAL at 06:17

## 2017-08-05 RX ADMIN — SENNOSIDES AND DOCUSATE SODIUM 2 TABLET: 8.6; 5 TABLET ORAL at 21:13

## 2017-08-05 RX ADMIN — SENNOSIDES AND DOCUSATE SODIUM 1 TABLET: 8.6; 5 TABLET ORAL at 09:29

## 2017-08-05 RX ADMIN — IBUPROFEN 800 MG: 400 TABLET ORAL at 21:13

## 2017-08-05 RX ADMIN — OXYCODONE HYDROCHLORIDE 10 MG: 5 TABLET ORAL at 17:52

## 2017-08-05 RX ADMIN — IBUPROFEN 800 MG: 400 TABLET ORAL at 09:29

## 2017-08-05 RX ADMIN — OXYCODONE HYDROCHLORIDE 10 MG: 5 TABLET ORAL at 09:29

## 2017-08-05 RX ADMIN — OXYCODONE HYDROCHLORIDE 10 MG: 5 TABLET ORAL at 13:06

## 2017-08-05 NOTE — PROGRESS NOTES
CM    I:  SW acknowledges request for consult for CD (Hx of addiction, using narcotics for post c-sec pain. Concern for usage triggering addiction.  Wants to resources/support), however, it appears patient has commercial insurance under her father.  SW left two voicemail messages for weekend CD counselor () to discuss possible need for CD assessment vs SW providing CD resources to patient.  As SW did not hear back from CD counselor, NAOMIE spoke w/floor RN asking for CD consult to be entered/requested by physician.    P:  Will continue to assist, if applicable, once seen by CD counselor    EMMANUEL Drummond    UPDATE @3788:  SW received call back from CD counselor stating she will be in to see patient tomorrow, 8/6/17, between 10 & 11AM.  NAOMIE will update nursing staff.  NAOMIE reviewed information in EPIC which confirms patient has a HX of narcotic use; no current use indicated.

## 2017-08-05 NOTE — LACTATION NOTE
Initial visit.   Breastfeeding handout given.   Advised to breastfeed exclusively, on demand, avoid pacifiers, bottles and formula unless medically indicated.  Encouraged rooming in, skin to skin, feeding on demand 8-12x/day or sooner if baby cues.  Explained benefits of holding and skin to skin.  Encouraged lots of skin to skin. Baby latched on well at last feeding per parents and RN.  Pumping after feeds, mother pumping her right breast only at time of visit.  .  Instructed to pump bilaterally.    Continues to nurse well per mom. No further questions at this time.   Will follow as needed.   Lizbet Villafuerte RNC, IBCLC

## 2017-08-05 NOTE — PLAN OF CARE
Problem: Goal Outcome Summary  Goal: Goal Outcome Summary  Outcome: Improving  Vital signs are stable.  Fundus is firm with scant flow. Ambulating independently.  Dressing has been removed after shower. Pain managed with 10 mg oxycodone, ibupofen and tylenol. Wearing abdominal binder for comfort. Able to void after mackay removal.   Breast feeding is going well.  Plaque psoriasis, states it does not bother her.  Will continue to monitor.

## 2017-08-05 NOTE — PROGRESS NOTES
S: Pt doing well. Infant is being . Pain is well controlled.    O: /69  Pulse 65  Temp 98.1  F (36.7  C) (Oral)  Resp 18  LMP 10/24/2016 (Exact Date)  SpO2 100%  Breastfeeding? Unknown        ABD:  Uterine fundus is firm, non-tender and at the level of the umbilicus       INC: clean/dry/intact                Hemoglobin   Date Value Ref Range Status   08/04/2017 8.8 (L) 11.7 - 15.7 g/dL Final            Lab Results   Component Value Date    RH  Pos 08/02/2017       A: Post-op Day #2 s/p C/Section    P: Continue Post Op Cares

## 2017-08-05 NOTE — PLAN OF CARE
Problem: Goal Outcome Summary  Goal: Goal Outcome Summary  Outcome: No Change  Vital signs stable. Voiding without difficulty. Lung sounds clear and equal. Using oxycodone/ibuprofen/tylenol for pain management. Up ambulating free of dizziness. Working on breastfeeding every 2-3 hours. Encouraged to call with questions/concerns. Will continue to monitor.

## 2017-08-05 NOTE — PLAN OF CARE
Problem: Goal Outcome Summary  Goal: Goal Outcome Summary  Outcome: Improving  Vss, fundus firm with scant flow. Incision intact with steri strips. Pain managed with tylenol, ibuprofen and oxycodone. Tolerating regular diet and good po intake. Voiding well. Working on breast feeding . Encouraged to continue to work on latching  back on breast if  unlatches early and to encourage 15-20 on each breast. Pumping after feedings and occasionally dropper feeding . Patient and spouse ask for help finding programs or assistance to pay for diapers as they state that diapers are expensive. Social work contacted to assist with this.

## 2017-08-06 VITALS
TEMPERATURE: 98.4 F | OXYGEN SATURATION: 100 % | DIASTOLIC BLOOD PRESSURE: 76 MMHG | SYSTOLIC BLOOD PRESSURE: 116 MMHG | HEART RATE: 86 BPM | RESPIRATION RATE: 16 BRPM

## 2017-08-06 PROCEDURE — 25000132 ZZH RX MED GY IP 250 OP 250 PS 637: Performed by: OBSTETRICS & GYNECOLOGY

## 2017-08-06 PROCEDURE — H0001 ALCOHOL AND/OR DRUG ASSESS: HCPCS

## 2017-08-06 RX ORDER — FERROUS SULFATE 325(65) MG
325 TABLET, DELAYED RELEASE (ENTERIC COATED) ORAL DAILY
Qty: 60 TABLET | Refills: 0 | Status: SHIPPED | OUTPATIENT
Start: 2017-08-06 | End: 2018-04-11

## 2017-08-06 RX ORDER — OXYCODONE HYDROCHLORIDE 5 MG/1
5 TABLET ORAL EVERY 6 HOURS PRN
Qty: 30 TABLET | Refills: 0 | Status: SHIPPED | OUTPATIENT
Start: 2017-08-06 | End: 2018-04-11

## 2017-08-06 RX ADMIN — OXYCODONE HYDROCHLORIDE 10 MG: 5 TABLET ORAL at 00:57

## 2017-08-06 RX ADMIN — SENNOSIDES AND DOCUSATE SODIUM 1 TABLET: 8.6; 5 TABLET ORAL at 08:57

## 2017-08-06 RX ADMIN — OXYCODONE HYDROCHLORIDE 10 MG: 5 TABLET ORAL at 03:43

## 2017-08-06 RX ADMIN — IBUPROFEN 800 MG: 400 TABLET ORAL at 10:00

## 2017-08-06 RX ADMIN — OXYCODONE HYDROCHLORIDE 10 MG: 5 TABLET ORAL at 08:57

## 2017-08-06 RX ADMIN — ACETAMINOPHEN 975 MG: 325 TABLET, FILM COATED ORAL at 08:57

## 2017-08-06 RX ADMIN — ACETAMINOPHEN 975 MG: 325 TABLET, FILM COATED ORAL at 00:56

## 2017-08-06 RX ADMIN — OXYCODONE HYDROCHLORIDE 10 MG: 5 TABLET ORAL at 12:34

## 2017-08-06 RX ADMIN — IBUPROFEN 800 MG: 400 TABLET ORAL at 03:42
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
## 2017-08-06 NOTE — PLAN OF CARE
Problem: Goal Outcome Summary  Goal: Goal Outcome Summary  Outcome: Improving  Vital signs are stable.  Fundus is firm with scant flow. Incision intact with steri strips. Pain managed with tylenol, ibuprofen and oxycodone. Tolerating regular diet and good po intake. Voiding well.  Breast feeding is going well.  Pumping after feedings and occasionally dropper feeding .   Will continue to monitor.

## 2017-08-06 NOTE — PLAN OF CARE
Rn called to pt room for pain medication. Pt rating pain 7/10 with pain in abdomen.  Rn was handing pain medication to pt and Rn noted patent to be visibly shaking. Rn asked pt about shaking and pt stated that she was cold. Rn offered warm blanket but patient declined.

## 2017-08-06 NOTE — PROGRESS NOTES
S: Pt doing well. Infant is being . Pain is well controlled.    O: /78  Pulse 86  Temp 98.8  F (37.1  C) (Oral)  Resp 18  LMP 10/24/2016 (Exact Date)  SpO2 100%  Breastfeeding? Unknown        ABD:  Uterine fundus is firm, non-tender and at the level of the umbilicus       INC: clean/dry/intact                Hemoglobin   Date Value Ref Range Status   08/04/2017 8.8 (L) 11.7 - 15.7 g/dL Final            Lab Results   Component Value Date    RH  Pos 08/02/2017       A: Post-op Day #3 s/p C/Section    P: Continue Post Op Cares   Discharge later today

## 2017-08-06 NOTE — PROGRESS NOTES
Rule 25 Assessment  Background Information   1. Date of Assessment Request  2. Date of Assessment  08/06/2017 3. Date Service Authorized     4.   GORDON Dangelo   5.  Phone Number   [phone] 6. Referent  [facility] 7. Assessment Site  [address]     8. Client Name   Rima Irving 9. Date of Birth  1995 Age  22 year old 10. Gender  female  11. PMI/ Insurance No.  [id number]   12. Client's Primary Language:  English 13. Do you require special accommodations, such as an  or assistance with written material? No   14. Current Address: [address] [place] [address]   15. Client Phone Numbers: [phone] (home)      16. Tell me what has happened to bring you here today.    Per chart note on 08/05/2017:    I:  NAOMIE acknowledges request for consult for CD (Hx of addiction, using narcotics for post c-sec pain. Concern for usage triggering addiction.  Wants to resources/support), however, it appears patient has commercial insurance under her father.  NAOMIE left two voicemail messages for weekend CD counselor () to discuss possible need for CD assessment vs SW providing CD resources to patient.  As NAOMIE did not hear back from CD counselor, NAOMIE spoke w/floor RN asking for CD consult to be entered/requested by physician.     P:  Will continue to assist, if applicable, once seen by CD counselor     EMMANUEL Drummond     UPDATE @2045:  NAOMIE received call back from CD counselor stating she will be in to see patient tomorrow, 8/6/17, between 10 & 11AM.  NAOMIE will update nursing staff.  NAOMIE reviewed information in EPIC which confirms patient has a HX of narcotic use; no current use indicated.      17. Have you had other rule 25 assessments?     Yes. When, Where, and What circumstances: has had 6 or more - court ordered.     DIMENSION I - Acute Intoxication /Withdrawal Potential   1. Chemical use most recent 12 months outside a  facility and other significant use history (client self-report)              X = Primary Drug Used   Age of First Use Most Recent Pattern of Use and Duration   Need enough information to show pattern (both frequency and amounts) and to show tolerance for each chemical that has a diagnosis   Date of last use and time, if needed   Withdrawal Potential? Requiring special care Method of use  (oral, smoked, snort, IV, etc)      Alcohol     16 Drinking daily for a few months, quit when she found out when she was pregnant   November 2016 No Oral      Marijuana/  Hashish   16 Daily use in the past, none current. November 2016 No Smoke      Cocaine/Crack     N/A  N/A      x   Meth/  Amphetamines   17 Heaviest use - daily  Less than 20$ of stuff a day when using daily.  June 30th, 2016 No Snort      Heroin     N/A  N/A         Other Opiates/  Synthetics   16 Pt will be prescribed Oxy when discharged.  Reports she's been prescribed them before, denies abusing them in past 08/07/2017 No Oral      Inhalants     N/A  N/A         Benzodiazepines     N/A  N/A         Hallucinogens     N/A  N/A         Barbiturates/  Sedatives/  Hypnotics N/A  N/A         Over-the-Counter Drugs   N/A  N/A         Other     N/A  N/A         Nicotine     N/A  N/A        2. Do you use greater amounts of alcohol/other drugs to feel intoxicated or achieve the desired effect?  No.  Or use the same amount and get less of an effect?  No.  Example: NA    3A. Have you ever been to detox?     Yes    3B. When was the first time?     High school, 18    3C. How many times since then?     NA    3D. Date of most recent detox:     NA    4.  Withdrawal symptoms: Have you had any of the following withdrawal symptoms?  Past 12 months Recent (past 30 days)   None None     's Visual Observations and Symptoms: No visible withdrawal symptoms at this time    Based on the above information, is withdrawal likely to require attention as part of treatment participation?   No    Dimension I Ratings   Acute intoxication/Withdrawal potential - The placing authority must use the criteria in Dimension I to determine a client s acute intoxication and withdrawal potential.    RISK DESCRIPTIONS - Severity ratin Client displays full functioning with good ability to tolerate and cope with withdrawal discomfort. No signs or symptoms of intoxication or withdrawal or resolving signs or symptoms.    REASONS SEVERITY WAS ASSIGNED (What about the amount of the person s use and date of most recent use and history of withdrawal problems suggests the potential of withdrawal symptoms requiring professional assistance? )     Pt had a CD assessment due to SW concerns with patient's substance abuse history. Pt denies any current use since she found out she was pregnant. Pt's drug of choice is meth and has not used since 2016. Pt's drug screen on 2017 was negative for all substances.          DIMENSION II - Biomedical Complications and Conditions   1. Do you have any current health/medical conditions?(Include any infectious diseases, allergies, or chronic or acute pain, history of chronic conditions)       Yes.   Illnesses/Medical Conditions you are receiving care for:   Past Medical History:   Diagnosis Date     ADHD (attention deficit hyperactivity disorder)      Anxiety      Depression      History of suicidal ideation      Psoriasis        2. Do you have a health care provider? When was your most recent appointment? What concerns were identified?     Denies having a PCP besides an OB.    3. If indicated by answers to items 1 or 2: How do you deal with these concerns? Is that working for you? If you are not receiving care for this problem, why not?      NA    4A. List current medication(s) including over-the-counter or herbal supplements--including pain management:     No current facility-administered medications for this encounter.      Current Outpatient Prescriptions   Medication      oxyCODONE (ROXICODONE) 5 MG IR tablet     ferrous sulfate 325 (65 FE) MG TBEC EC tablet     RaNITidine HCl (ZANTAC PO)     Prenatal Vit-Fe Fumarate-FA (PRENATAL MULTIVITAMIN  WITH IRON) 28-0.8 MG TABS         4B. Do you follow current medical recommendations/take medications as prescribed?     Yes    4C. When did you last take your medication?     NA    5. Has a health care provider/healer ever recommended that you reduce or quit alcohol/drug use?     Yes    6. Are you pregnant?     No    7. Have you had any injuries, assaults/violence towards you, accidents, health related issues, overdose(s) or hospitalizations related to your use of alcohol or other drugs:     No    8. Do you have any specific physical needs/accommodations? No    Dimension II Ratings   Biomedical Conditions and Complications - The placing authority must use the criteria in Dimension II to determine a client s biomedical conditions and complications.   RISK DESCRIPTIONS - Severity ratin Client displays full functioning with good ability to cope with physical discomfort.    REASONS SEVERITY WAS ASSIGNED (What physical/medical problems does this person have that would inhibit his or her ability to participate in treatment? What issues does he or she have that require assistance to address?)    Pt is able to obtain services as needed.          DIMENSION III - Emotional, Behavioral, Cognitive Conditions and Complications   1. (Optional) Tell me what it was like growing up in your family. (substance use, mental health, discipline, abuse, support)     Denies any MH or CD in family history  1 sister and 3 brothers, pt is in the middle.  Pt reports siblings don't use substances.     2. When was the last time that you had significant problems...  A. with feeling very trapped, lonely, sad, blue, depressed or hopeless  about the future? Never    B. with sleep trouble, such as bad dreams, sleeping restlessly, or falling  asleep during the day? Never    C.  with feeling very anxious, nervous, tense, scared, panicked, or like  something bad was going to happen? Never    D. with becoming very distressed and upset when something reminded  you of the past? Never    E. with thinking about ending your life or committing suicide? Never    3. When was the last time that you did the following things two or more times?  A. Lied or conned to get things you wanted or to avoid having to do  something? 1+ years ago    B. Had a hard time paying attention at school, work, or home? Never    C. Had a hard time listening to instructions at school, work, or home? Never    D. Were a bully or threatened other people? Never    E. Started physical fights with other people? Never    Note: These questions are from the Global Appraisal of Individual Needs--Short Screener. Any item marked  past month  or  2 to 12 months ago  will be scored with a severity rating of at least 2.     For each item that has occurred in the past month or past year ask follow up questions to determine how often the person has felt this way or has the behavior occurred? How recently? How has it affected their daily living? And, whether they were using or in withdrawal at the time?    NA    4A. If the person has answered item 2E with  in the past year  or  the past month , ask about frequency and history of suicide in the family or someone close and whether they were under the influence.     NA    Any history of suicide in your family? Or someone close to you?     Yes, explain: My sister committed suicide, Aug 22nd, 2014  Mom reports sister was under the influence   Grandma committed suicide in 1996, unknown if under the influence.     4B. If the person answered item 2E  in the past month  ask about  intent, plan, means and access and any other follow-up information  to determine imminent risk. Document any actions taken to intervene  on any identified imminent risk.      NA    5A. Have you ever been diagnosed with a  mental health problem?     Yes, If yes explain: anxiety and ADHD    5B. Are you receiving care for any mental health issues? If yes, what is the focus of that care or treatment?  Are you satisfied with the service? Most recent appointment?  How has it been helpful?     NA     6. Have you been prescribed medications for emotional/psychological problems?     Yes.  In the past, none currently  Vyvance in the past    7. Does your MH provider know about your use?     NA    8A. Have you ever been verbally, emotionally, physically or sexually abused?      Denies     Follow up questions to learn current risk, continuing emotional impact.      NA    8B. Have you received counseling for abuse?      N/A    9. Have you ever experienced or been part of a group that experienced community violence, historical trauma, rape or assault?     No    10A. :    No    11. Do you have problems with any of the following things in your daily life?    No    Note: If the person has any of the above problems, follow up with items 12, 13, and 14. If none of the issues in item 11 are a problem for the person, skip to item 15.    NA    12. Have you been diagnosed with traumatic brain injury or Alzheimer s?  No    13. If the answer to #12 is no, ask the following questions:    Have you ever hit your head or been hit on the head? Yes, concussion as a child    Were you ever seen in the Emergency Room, hospital or by a doctor because of an injury to your head? No    Have you had any significant illness that affected your brain (brain tumor, meningitis, West Nile Virus, stroke or seizure, heart attack, near drowning or near suffocation)? No    14. If the answer to #12 is yes, ask if any of the problems identified in #11 occurred since the head injury or loss of oxygen. NA    15A. Highest grade of school completed:     High school graduate/GED    15B. Do you have a learning disability? No    15C. Did you ever have tutoring in Math or English?  No    15D. Have you ever been diagnosed with Fetal Alcohol Effects or Fetal Alcohol Syndrome? No    16. If yes to item 15 B, C, or D: How has this affected your use or been affected by your use?     NA    Dimension III Ratings   Emotional/Behavioral/Cognitive - The placing authority must use the criteria in Dimension III to determine a client s emotional, behavioral, and cognitive conditions and complications.   RISK DESCRIPTIONS - Severity ratin Client has good impulse control and coping skills and presents no risk of harm to self or others. Client functions in    REASONS SEVERITY WAS ASSIGNED - What current issues might with thinking, feelings or behavior pose barriers to participation in a treatment program? What coping skills or other assets does the person have to offset those issues? Are these problems that can be initially accommodated by a treatment provider? If not, what specialized skills or attributes must a provider have?    Pt denies any mental health symptoms. Pt is diagnosed with anxiety and ADHD. Pt does not take medications for mental health symptoms currently. Pt does not have a therapist, but has in the past at treatment. Pt reports a history of suicide in her family. Pt reports SI and SIB in the past as a teenager but nothing current. Pt's additional factors include: history of suicide, poorly treatment mental health issues, chronic physical issues. Pt's protective factors include:          DIMENSION IV - Readiness for Change   1. You ve told me what brought you here today. (first section) What do you think the problem really is?     Pt reports there are no concerns about her use currently.    2. Tell me how things are going. Ask enough questions to determine whether the person has use related problems or assets that can be built upon in the following areas: Family/friends/relationships; Legal; Financial; Emotional; Educational; Recreational/ leisure; Vocational/employment; Living  arrangements (DSM)      Pt reports just had a baby, on maternity leave. Happy with baby.    3. What activities have you engaged in when using alcohol/other drugs that could be hazardous to you or others (i.e. driving a car/motorcycle/boat, operating machinery, unsafe sex, sharing needles for drugs or tattoos, etc     Driving in past    4. How much time do you spend getting, using or getting over using alcohol or drugs? (DSM)     NA    5. Reasons for drinking/drug use (Use the space below to record answers. It may not be necessary to ask each item.)  Like the feeling Yes   Trying to forget problems No   To cope with stress No   To relieve physical pain No   To cope with anxiety No   To cope with depression No   To relax or unwind Yes   Makes it easier to talk with people No   Partner encourages use Yes   Most friends drink or use Yes   To cope with family problems No   Afraid of withdrawal symptoms/to feel better N/A   Other (specify)  No     A. What concerns other people about your alcohol or drug use/Has anyone told you that you use too much? What did they say? (DSM)     Family was concerned in the past, that she needed to go to treatment.     B. What did you think about that/ do you think you have a problem with alcohol or drug use?     Pt realizes she had a problem in the past, was going down a bad path. Pt denies having any issues currently.    6. What changes are you willing to make? What substance are you willing to stop using? How are you going to do that? Have you tried that before? What interfered with your success with that goal?      NA    7. What would be helpful to you in making this change?     NA    Dimension IV Ratings   Readiness for Change - The placing authority must use the criteria in Dimension IV to determine a client s readiness for change.   RISK DESCRIPTIONS - Severity ratin Client is cooperative, motivated, ready to change, admits problems, committed to change, and engaged in treatment  as a responsible participant.    REASONS SEVERITY WAS ASSIGNED - (What information did the person provide that supports your assessment of his or her readiness to change? How aware is the person of problems caused by continued use? How willing is she or he to make changes? What does the person feel would be helpful? What has the person been able to do without help?)      Pt was cooperative to have assessment even though she was surprised. Pt reports she had problems with substances in the past, can see her pattern of behaviors now that she is sober. Pt is able to obtain CD resources if needed. Pt reports she has no concerns about using substances now.          DIMENSION V - Relapse, Continued Use, and Continued Problem Potential   1. In what ways have you tried to control, cut-down or quit your use? If you have had periods of sobriety, how did you accomplish that? What was helpful? What happened to prevent you from continuing your sobriety? (DSM)     Pt reports this has been her longest period of sobriety.   Pt reports had a year clean before and relapsed.   Living in a sober house and going to NA and AA meetings.     2. Have you experienced cravings? If yes, ask follow up questions to determine if the person recognizes triggers and if the person has had any success in dealing with them.     NA    3. Have you been treated for alcohol/other drug abuse/dependence?     Yes.  3B. Number of times(lifetime) (over what period) 6.  3C. Number of times completed treatment (lifetime) 6.  3D. During the past three years have you participated in outpatient and/or residential?  Yes.  3E. When and where? In [place].   3F. What was helpful? What was not? .  All inpatient court ordered  Last one in 12/2015    4. Support group participation: Have you/do you attend support group meetings to reduce/stop your alcohol/drug use? How recently? What was your experience? Are you willing to restart? If the person has not participated, is  he or she willing?     No  Gone in past  Would consider going again     5. What would assist you in staying sober/straight?     Working, staying busy with , self care    Dimension V Ratings   Relapse/Continued Use/Continued problem potential - The placing authority must use the criteria in Dimension V to determine a client s relapse, continued use, and continued problem potential.   RISK DESCRIPTIONS - Severity ratin Client recognizes relapse issues and prevention strategies, but displays some vulnerability for further substance use or mental health problems.    REASONS SEVERITY WAS ASSIGNED - (What information did the person provide that indicates his or her understanding of relapse issues? What about the person s experience indicates how prone he or she is to relapse? What coping skills does the person have that decrease relapse potential?)      Pt reports a history of CD treatment. Pt reports she completed every treatment she entered. Pt reports past history of sober support group meetings but nothing current. Pt is open to attending sober support meetings again. Pt's current prescription to opiates and not being pregnant (having forced sobriety) raise the dimension rating. Pt appears to have sober coping skills she is able to use.          DIMENSION VI - Recovery Environment   1. Are you employed/attending school? Tell me about that.     On maternity leave now for about 3 months.   Employed at [employer] in [place]     2A. Describe a typical day; evening for you. Work, school, social, leisure, volunteer, spiritual practices. Include time spent obtaining, using, recovering from drugs or alcohol. (DSM)     NA    2B. How often do you spend more time than you planned using or use more than you planned? (DSM)     NA    3. How important is using to your social connections? Do many of your family or friends use?     Friends do but she doesn't hang out with those friends.     4A. Are you currently in a significant  relationship?     Yes.  4B. How long? Almost two years,  for a month.     4C. Sexual Orientation:     Heterosexual    5A. Who do you live with?      Live with  in an apartment in [place]     5B. Tell me about their alcohol/drug use and mental health issues.     NA    5C. Are you concerned for your safety there? No    5D. Are you concerned about the safety of anyone else who lives with you? No    6A. Do you have children who live with you?     Yes.  (Ask follow-up questions to determine the person s relationship and responsibility, both legal and care giving; and what arrangements are made for supervision for the children when the person is not available.) Gamaliel, boy, infant     6B. Do you have children who do not live with you?     No    7A. Who supports you in making changes in your alcohol or drug use? What are they willing to do to support you? Who is upset or angry about you making changes in your alcohol or drug use? How big a problem is this for you?      Everyone is supportive.     7B. This table is provided to record information about the person s relationships and available support It is not necessary to ask each item; only to get a comprehensive picture of their support system.  How often can you count on the following people when you need someone?   Partner / Spouse Always supportive   Parent(s)/Aunt(s)/Uncle(s)/Grandparents Always supportive   Sibling(s)/Cousin(s) Always supportive   Child(jasbir) N/A   Other relative(s) Always supportive   Friend(s)/neighbor(s) Always supportive   Child(jasbir) s father(s)/mother(s) N/A   Support group member(s) Always supportive   Community of [place] members Always supportive   /counselor/therapist/healer N/A   Other (specify) N/A     8A. What is your current living situation?     Live in an apartment in [place]     8B. What is your long term plan for where you will be living?     Staying until lease is up, then will be moving to [facility]     8C. Tell  me about your living environment/neighborhood? Ask enough follow up questions to determine safety, criminal activity, availability of alcohol and drugs, supportive or antagonistic to the person making changes.      Safe    9. Criminal justice history: Gather current/recent history and any significant history related to substance use--Arrests? Convictions? Circumstances? Alcohol or drug involvement? Sentences? Still on probation or parole? Expectations of the court? Current court order? Any sex offenses - lifetime? What level? (DSM)    Denies current legals  Past legals: underage drinking ticket      10. What obstacles exist to participating in treatment? (Time off work, childcare, funding, transportation, pending half-way time, living situation)     NA    Dimension VI Ratings   Recovery environment - The placing authority must use the criteria in Dimension VI to determine a client s recovery environment.   RISK DESCRIPTIONS - Severity ratin Client is engaged in structured, meaningful activity and has a supportive significant other, family, and living environment.    REASONS SEVERITY WAS ASSIGNED - (What support does the person have for making changes? What structure/stability does the person have in his or her daily life that will increase the likelihood that changes can be sustained? What problems exist in the person s environment that will jeopardize getting/staying clean and sober?)     Pt is employed, currently on maternity leave. Pt lives with her  and . Pt reports her  does not use substances. Pt reports her family is supportive of her. Pt reports she has friends who use, but doesn't hang out with them. Pt reports past legals but nothing current.          Client Choice/Exceptions   Would you like services specific to language, age, gender, culture, Pent[employer]al preference, race, ethnicity, sexual orientation or disability?  No    What particular treatment choices and options would you like to  have? NA    Do you have a preference for a particular treatment program? NA    Criteria for Diagnosis     Criteria for Diagnosis  DSM-5 Criteria for Substance Use Disorder  Instructions: Determine whether the client currently meets the criteria for Substance Use Disorder using the diagnostic criteria in the DSM-V pp.481-589. 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

## 2017-08-06 NOTE — PLAN OF CARE
Problem: Goal Outcome Summary  Goal: Goal Outcome Summary  Outcome: Adequate for Discharge Date Met:  17  Vss, fundus firm with scant flow. Incision intact with steri strips. Working on pain management. Patient taking scheduled tylenol and prn ibuprofen and 10 mg oxycodone every 3-4 hours. Patient continue to rate pain 5-7 today, patient states that her pain may be because she needs to have a bowel movement. RN offered suppository, patient declined. States she wants to try miralx when she gets home. Patient currently drinking prune juice and warm tea to help.   Breast feeding  well, states she feels like her milk is starting to come in.   Prescription for oxycodone and iron given to patient.   Discharge instructions provided to patient and spouse. All questions given at that time. Patient discharged to with  via spouse and grandma.

## 2017-08-06 NOTE — PLAN OF CARE
Problem: Goal Outcome Summary  Goal: Goal Outcome Summary  Outcome: No Change  VSS. Ambulating independently in room. Tylenol, ibuprofen and prn oxycodone for pain control with relief per patient. I/O adequate. Tolerating regular diet. Breastfeeding going well. Continue to monitor.

## 2017-08-06 NOTE — CONSULTS
8/6/2017  CD consult acknowledged. Writer met with patient, introduced self and gave business card. Pt was receptive to the consult. Pt completed CD assessment. Pt is not recommended any treatment at this time. Pt knows how to access meetings, AA and NA and find resources as needed. Pt reports having periods of sobriety in the past. Pt reports she isn't worried about opiates that she is being prescribed as she has been prescribed them in the past and didn't abuse them. Pt reported she will reach out if she feels she needs to in the future.    Heidi Vega, JULIETC

## 2017-08-06 NOTE — PROGRESS NOTES
"CM    I: SW reviewed CD notes.  Patient has been given pertinent information should she need to utilize CD resources, thus SW consult for Chemical Dependency was closed.  SW did still meet with patient due to request from RN, as patients spouse (s.o) had inquired about resources for baby products.  SW introduced self and role.  Patient gave permission for SW to speak in front of spouse/significant other.  Spouse stated he did not recall what he had asked RN about.  Patient stated she remembered that he asked about diapers. Patient then stated to SW \"we have enough diapers at home\".  Spouse stated \"we would certainly take whatever you have to give us\".  SW explained we do have access to gift cards if there is a significant need.  Patient then stated they did not need this, that \"we have enough money for the baby\".   SW did offer [place]out for [place] program, should they need additional resources/assistance.  Patient stated \"we are already enrolled in WIC\".  SW encouraged patient to reach out to [place] should any needs arise, as they may have access to further resources.  Patient appeared to understand information provided.  They had no further questions for SW.    P:  No further SW interventions anticipated at this time.  Will be available if needs arise.    EMMANUEL Drummond  "

## 2017-08-17 NOTE — DISCHARGE SUMMARY
Anuj Zavaleta was a  1, para 0, who was admitted to the hospital in labor.  She had spontaneous rupture of membranes.  She labored throughout the day but did not make adequate progress.  We were unable to get adequate pain control because she had severe plaque psoriasis and could not have epidural anesthesia because of that.      Ultimately, she underwent a primary low transverse  for arrest of labor, and she had about a 700 cc estimated blood loss at the time of surgery, but she dropped her hemoglobin postoperatively down to 8.8.  Diagnosis of acute blood loss anemia, assumed secondary to complication from her caesarean section surgery was made.      She was sent home on iron for treatment, and she is to return to clinic in 6 weeks for a postoperative checkup.  There were no other complications.         BARBARA WANG MD             D: 2017 22:36   T: 2017 03:26   MT: JESUS#[id number]      Name:     ANUJ ZAVALETA   MRN:      [id number]        Account:        [id number]   :      1995           Admit Date:     [id number]                                  Discharge Date: 2017      Document: [id number]

## 2017-09-18 ENCOUNTER — PRENATAL OFFICE VISIT (OUTPATIENT)
Dept: OBGYN | Facility: CLINIC | Age: 22
End: 2017-09-18
Payer: COMMERCIAL

## 2017-09-18 ENCOUNTER — TELEPHONE (OUTPATIENT)
Dept: NURSING | Facility: CLINIC | Age: 22
End: 2017-09-18

## 2017-09-18 VITALS
DIASTOLIC BLOOD PRESSURE: 64 MMHG | BODY MASS INDEX: 19.77 KG/M2 | SYSTOLIC BLOOD PRESSURE: 101 MMHG | WEIGHT: 115.8 LBS | HEIGHT: 64 IN

## 2017-09-18 DIAGNOSIS — N89.8 VAGINAL DISCHARGE: Primary | ICD-10-CM

## 2017-09-18 DIAGNOSIS — B96.89 BACTERIAL VAGINOSIS: Primary | ICD-10-CM

## 2017-09-18 DIAGNOSIS — N76.0 BACTERIAL VAGINOSIS: Primary | ICD-10-CM

## 2017-09-18 LAB
SPECIMEN SOURCE: ABNORMAL
WET PREP SPEC: ABNORMAL

## 2017-09-18 PROCEDURE — 87210 SMEAR WET MOUNT SALINE/INK: CPT | Performed by: OBSTETRICS & GYNECOLOGY

## 2017-09-18 PROCEDURE — G0145 SCR C/V CYTO,THINLAYER,RESCR: HCPCS | Performed by: OBSTETRICS & GYNECOLOGY

## 2017-09-18 PROCEDURE — 99207 ZZC POST PARTUM EXAM: CPT | Performed by: OBSTETRICS & GYNECOLOGY

## 2017-09-18 RX ORDER — METRONIDAZOLE 7.5 MG/G
1 GEL VAGINAL AT BEDTIME
Qty: 70 G | Refills: 0 | Status: SHIPPED | OUTPATIENT
Start: 2017-09-18 | End: 2017-09-23

## 2017-09-18 ASSESSMENT — ANXIETY QUESTIONNAIRES
3. WORRYING TOO MUCH ABOUT DIFFERENT THINGS: NEARLY EVERY DAY
2. NOT BEING ABLE TO STOP OR CONTROL WORRYING: SEVERAL DAYS
7. FEELING AFRAID AS IF SOMETHING AWFUL MIGHT HAPPEN: NEARLY EVERY DAY
GAD7 TOTAL SCORE: 15
6. BECOMING EASILY ANNOYED OR IRRITABLE: MORE THAN HALF THE DAYS
5. BEING SO RESTLESS THAT IT IS HARD TO SIT STILL: MORE THAN HALF THE DAYS
IF YOU CHECKED OFF ANY PROBLEMS ON THIS QUESTIONNAIRE, HOW DIFFICULT HAVE THESE PROBLEMS MADE IT FOR YOU TO DO YOUR WORK, TAKE CARE OF THINGS AT HOME, OR GET ALONG WITH OTHER PEOPLE: VERY DIFFICULT
1. FEELING NERVOUS, ANXIOUS, OR ON EDGE: NEARLY EVERY DAY

## 2017-09-18 ASSESSMENT — PATIENT HEALTH QUESTIONNAIRE - PHQ9
5. POOR APPETITE OR OVEREATING: SEVERAL DAYS
SUM OF ALL RESPONSES TO PHQ QUESTIONS 1-9: 11

## 2017-09-18 NOTE — PROGRESS NOTES
"  SUBJECTIVE:  Rima Irving,  is here for a postpartum visit.  She had a  Section  on 2017 delivering a healthy baby boy, named Gamaliel weighing 8 lbs 8 oz at term.      HPI:  Patient here for postpartum visit  Doing well  Has a lot of vaginal discharge  Wet smear showed clue cells so prescription sent for metrogel  Pap done today  Plans to use condoms    Last PHQ-9 score on record=   PHQ-9 SCORE 2017   Total Score 11     CHINMAY-7 SCORE 2016   Total Score 19 3 15       Delivery complications:  Yes,   Breast feeding:  No  Bladder problems:  No  Bowel problems/hemorrhoids:  No  Episiotomy/laceration/incision healed? Yes  Vaginal flow: Clear Vanigal discharge.   [place]:  No  Contraception: not sexually active  Emotional adjustment:  doing well, and tired.  Back to work: Unsure.     12 point review of systems negative other than symptoms noted below.  Genitourinary: Vaginal Discharge    OBJECTIVE:  Vitals: /64  Ht 1.626 m (5' 4\")  Wt 52.5 kg (115 lb 12.8 oz)  LMP 10/24/2016 (Exact Date)  Breastfeeding? No  BMI 19.88 kg/m2  BMI= Body mass index is 19.88 kg/(m^2).  General - pleasant female in no acute distress.  Breast -  deferred  Abdomen - Incision well-healed  Pelvic - EG: normal adult female, BUS: within normal limits, Vagina: well rugated, no discharge, Cervix: no lesions or CMT, Uterus: firm, normal sized and nontender, anteverted in position. Adnexae: no masses or tenderness.  Rectovaginal - deferred.    ASSESSMENT:    ICD-10-CM    1. Vaginal discharge N89.8 Wet prep   2. Encounter for routine postpartum follow-up Z39.2 Pap imaged thin layer screen reflex to HPV if ASCUS - recommended age 25 - 29 years       PLAN:  May resume normal activities without restrictions.  Pap smear was done today.    Full counseling was provided, and all questions were answered.   Return to clinic in one year for an annual visit.   See derm for psoriasis since " we can't treat that for her  There are no Patient Instructions on file for this visit.  Alivia Arizmendi MD

## 2017-09-18 NOTE — MR AVS SNAPSHOT
"              After Visit Summary   2017    Rima Irving    MRN: [id number]           Patient Information     Date Of Birth          1995        Visit Information        Provider Department      2017 3:50 PM Alivia Arizmendi MD [facility]        Today's Diagnoses     Vaginal discharge    -  1    Encounter for routine postpartum follow-up           Follow-ups after your visit        Who to contact     If you have questions or need follow up information about today's clinic visit or your schedule please contact [facility] directly at [phone].  Normal or non-critical lab and imaging results will be communicated to you by [employer], letter or phone within 4 business days after the clinic has received the results. If you do not hear from us within 7 days, please contact the clinic through Back[employer]Agent[employer] or phone. If you have a critical or abnormal lab result, we will notify you by phone as soon as possible.  Submit refill requests through [employer] or call your pharmacy and they will forward the refill request to us. Please allow 3 business days for your refill to be completed.          Additional Information About Your Visit        MyChart Information     [employer] lets you send messages to your doctor, view your test results, renew your prescriptions, schedule appointments and more. To sign up, go to [email or web address] . Click on \"Log in\" on the left side of the screen, which will take you to the Welcome page. Then click on \"Sign up Now\" on the right side of the page.     You will be asked to enter the access code listed below, as well as some personal information. Please follow the directions to create your username and password.     Your access code is: [email or web address]  Expires: 2017 12:37 PM     Your access code will  in 90 days. If you need help or a new code, please call your [facility] or [phone].        Care EveryWhere ID     This is " "your Care EveryWhere ID. This could be used by other organizations to access your [facility] medical records  [id number]        Your Vitals Were     Height Last Period Breastfeeding? BMI (Body Mass Index)          1.626 m (5' 4\") 10/24/2016 (Exact Date) No 19.88 kg/m2         Blood Pressure from Last 3 Encounters:   09/18/17 101/64   08/06/17 116/76   07/20/17 113/72    Weight from Last 3 Encounters:   09/18/17 52.5 kg (115 lb 12.8 oz)   07/20/17 60.3 kg (133 lb)   07/10/17 59.9 kg (132 lb)              We Performed the Following     Pap imaged thin layer screen reflex to HPV if ASCUS - recommended age 25 - 29 years     Wet prep          Today's Medication Changes          These changes are accurate as of: 9/18/17 11:59 PM.  If you have any questions, ask your nurse or doctor.               Start taking these medicines.        Dose/Directions    metroNIDAZOLE 0.75 % vaginal gel   Commonly known as:  METROGEL   Used for:  Bacterial vaginosis   Started by:  Alivia Arizmendi MD        Dose:  1 applicator   Place 1 applicator (5 g) vaginally At Bedtime for 5 days   Quantity:  70 g   Refills:  0            Where to get your medicines      These medications were sent to [facility] [address], [address]  [address], [facility] [address], [facility] [address]     Phone:  [phone]     metroNIDAZOLE 0.75 % vaginal gel                Primary Care Provider    Physician No Ref-Primary       No address on file        Equal Access to Services     [facility] COLETTE AH: Hadii emiliano maldonadoo Sojedali, waaxda luqadaha, qaybta kaalmada adeegyada, meghan lin. So [facility] [phone].    ATENCIÓN: Si habla barbañol, tiene a mckeon disposición servicios gratuitos de asistencia lingüística. Llame al [phone].    We comply with applicable federal civil rights laws and [place] laws. We do not discriminate on the basis of race, color, national origin, age, disability sex, sexual " orientation or gender identity.            Thank you!     Thank you for choosing [facility]  for your care. Our goal is always to provide you with excellent care. Hearing back from our patients is one way we can continue to improve our services. Please take a few minutes to complete the written survey that you may receive in the mail after your visit with us. Thank you!             Your Updated Medication List - Protect others around you: Learn how to safely use, store and throw away your medicines at www.disposemymeds.org.          This list is accurate as of: 9/18/17 11:59 PM.  Always use your most recent med list.                   Brand Name Dispense Instructions for use Diagnosis    ferrous sulfate 325 (65 FE) MG Tbec EC tablet     60 tablet    Take 1 tablet (325 mg) by mouth daily    Anemia due to blood loss, acute       metroNIDAZOLE 0.75 % vaginal gel    METROGEL    70 g    Place 1 applicator (5 g) vaginally At Bedtime for 5 days    Bacterial vaginosis       oxyCODONE 5 MG IR tablet    ROXICODONE    30 tablet    Take 1 tablet (5 mg) by mouth every 6 hours as needed for moderate to severe pain    Acute post-operative pain       prenatal multivitamin  with iron 28-0.8 MG Tabs     90 each    Take 1 tablet by mouth daily    Pregnancy test performed, pregnancy confirmed       ZANTAC PO      Take 150 mg by mouth

## 2017-09-18 NOTE — LETTER
September 26, 2017      Rima Irving  [address] [address]    Dear ,      I am happy to inform you that your recent cervical cancer screening test (PAP smear) was normal.      Preventative screenings such as this help to ensure your health for years to come. You should repeat a pap smear in 3 years, unless otherwise directed.      You will still need to return to the clinic every year for your annual exam and other preventive tests.     Please contact the clinic at [phone] if you have further questions.       Sincerely,      Alivia Arizmendi MD/amadeo

## 2017-09-18 NOTE — TELEPHONE ENCOUNTER
Nisreen, pharmacist calling looking for an Rx. POC note not compete from today's visit, no Rx sent.  Called DR. Arizmendi spoke over the phon she stated to send metrogel 0.75% vaginal gel 5g vaginally QHS for 5 days for BV.  Rx sent.

## 2017-09-19 ASSESSMENT — ANXIETY QUESTIONNAIRES: GAD7 TOTAL SCORE: 15

## 2017-09-22 LAB
COPATH REPORT: NORMAL
PAP: NORMAL

## 2018-04-08 ENCOUNTER — NURSE TRIAGE (OUTPATIENT)
Dept: NURSING | Facility: CLINIC | Age: 23
End: 2018-04-08

## 2018-04-08 NOTE — TELEPHONE ENCOUNTER
"  Additional Information    Negative: [1] ACUTE NEURO SYMPTOM AND [2] present now  (DEFINITION: difficult to awaken OR confused thinking and talking OR slurred speech OR weakness of arms OR unsteady walking)    Negative: Knocked out (unconscious) > 1 minute    Negative: Seizure (convulsion) occurred  (Exception: prior history of seizures and now alert and without Acute Neuro Symptoms)    Negative: Penetrating head injury (e.g., knife, gun shot wound, metal object)    Negative: [1] Major bleeding (e.g., actively dripping or spurting) AND [2] can't be stopped    Negative: [1] Dangerous mechanism of injury (e.g., MVA, diving, trampoline, contact sports, fall > 10 feet or 3 meters) AND [2] NECK pain AND [3] began < 1 hour after injury    Negative: Sounds like a life-threatening emergency to the triager    Negative: Can't remember what happened (amnesia)    Negative: Vomiting once or more    Negative: [1] Loss of vision or double vision AND [2] present now    Negative: Watery or blood-tinged fluid dripping from the NOSE or EARS now  (Exception: tears from crying or nosebleed from nasal trauma)    Negative: One or two \"black eyes\" (bruising, purple color of eyelids)    Negative: [1] Large swelling AND [2] size > palm of person's hand    Negative: Skin is split open or gaping  (or length > 1/2 inch or 12 mm)    Negative: [1] Bleeding AND [2] won't stop after 10 minutes of direct pressure (using correct technique)    Negative: Sounds like a serious injury to the triager    Negative: [1] ACUTE NEURO SYMPTOM AND [2] now fine  (DEFINITION: difficult to awaken OR confused thinking and talking OR slurred speech OR weakness of arms OR unsteady walking)    Negative: [1] Knocked out (unconscious) < 1 minute AND [2] now fine    Negative: [1] SEVERE headache AND [2]  not improved 2 hours after pain medicine/ice packs    Negative: Dangerous injury (e.g., MVA, diving, trampoline, contact sports, fall > 10 feet or 3 meters) or severe " blow from hard object (e.g., golf club or baseball bat)    Negative: Taking Coumadin (warfarin) or other strong blood thinner, or known bleeding disorder (e.g., thrombocytopenia)    Negative: Suspicious history for the injury    Negative: Patient is confused or is an unreliable provider of information (e.g., dementia, profound mental retardation, alcohol intoxication)    Negative: [1] Last tetanus shot > 5 years ago AND [2] DIRTY cut or scrape    Negative: [1] After 72 hours AND [2] headache persists    Scalp swelling, bruise or pain (all triage questions negative)    Protocols used: HEAD INJURY-ADULT-

## 2018-04-11 ENCOUNTER — APPOINTMENT (OUTPATIENT)
Dept: CT IMAGING | Facility: CLINIC | Age: 23
End: 2018-04-11
Attending: EMERGENCY MEDICINE
Payer: COMMERCIAL

## 2018-04-11 ENCOUNTER — HOSPITAL ENCOUNTER (OUTPATIENT)
Facility: CLINIC | Age: 23
Setting detail: OBSERVATION
Discharge: HOME | End: 2018-04-12
Attending: EMERGENCY MEDICINE | Admitting: INTERNAL MEDICINE
Payer: COMMERCIAL

## 2018-04-11 DIAGNOSIS — T50.902A INTENTIONAL DRUG OVERDOSE, INITIAL ENCOUNTER (H): ICD-10-CM

## 2018-04-11 LAB
ALBUMIN SERPL-MCNC: 4.1 G/DL (ref 3.4–5)
ALP SERPL-CCNC: 79 U/L (ref 40–150)
ALT SERPL W P-5'-P-CCNC: 16 U/L (ref 0–50)
AMPHETAMINES UR QL SCN: NEGATIVE
ANION GAP SERPL CALCULATED.3IONS-SCNC: 9 MMOL/L (ref 3–14)
APAP SERPL-MCNC: <2 MG/L (ref 10–20)
AST SERPL W P-5'-P-CCNC: 24 U/L (ref 0–45)
BARBITURATES UR QL: NEGATIVE
BASOPHILS # BLD AUTO: 0 10E9/L (ref 0–0.2)
BASOPHILS NFR BLD AUTO: 0.2 %
BENZODIAZ UR QL: POSITIVE
BILIRUB SERPL-MCNC: 0.6 MG/DL (ref 0.2–1.3)
BUN SERPL-MCNC: 17 MG/DL (ref 7–30)
CALCIUM SERPL-MCNC: 8.7 MG/DL (ref 8.5–10.1)
CANNABINOIDS UR QL SCN: NEGATIVE
CHLORIDE SERPL-SCNC: 107 MMOL/L (ref 94–109)
CO2 SERPL-SCNC: 23 MMOL/L (ref 20–32)
COCAINE UR QL: NEGATIVE
CREAT SERPL-MCNC: 0.59 MG/DL (ref 0.52–1.04)
DIFFERENTIAL METHOD BLD: ABNORMAL
EOSINOPHIL # BLD AUTO: 0.4 10E9/L (ref 0–0.7)
EOSINOPHIL NFR BLD AUTO: 3.5 %
ERYTHROCYTE [DISTWIDTH] IN BLOOD BY AUTOMATED COUNT: 12.9 % (ref 10–15)
ETHANOL SERPL-MCNC: <0.01 G/DL
GFR SERPL CREATININE-BSD FRML MDRD: >90 ML/MIN/1.7M2
GLUCOSE BLDC GLUCOMTR-MCNC: 74 MG/DL (ref 70–99)
GLUCOSE SERPL-MCNC: 79 MG/DL (ref 70–99)
HCG SERPL QL: NEGATIVE
HCT VFR BLD AUTO: 39.3 % (ref 35–47)
HGB BLD-MCNC: 12.7 G/DL (ref 11.7–15.7)
IMM GRANULOCYTES # BLD: 0 10E9/L (ref 0–0.4)
IMM GRANULOCYTES NFR BLD: 0.2 %
INTERPRETATION ECG - MUSE: NORMAL
LYMPHOCYTES # BLD AUTO: 1.8 10E9/L (ref 0.8–5.3)
LYMPHOCYTES NFR BLD AUTO: 14.9 %
MCH RBC QN AUTO: 29.4 PG (ref 26.5–33)
MCHC RBC AUTO-ENTMCNC: 32.3 G/DL (ref 31.5–36.5)
MCV RBC AUTO: 91 FL (ref 78–100)
MONOCYTES # BLD AUTO: 0.9 10E9/L (ref 0–1.3)
MONOCYTES NFR BLD AUTO: 7.9 %
NEUTROPHILS # BLD AUTO: 8.7 10E9/L (ref 1.6–8.3)
NEUTROPHILS NFR BLD AUTO: 73.3 %
NRBC # BLD AUTO: 0 10*3/UL
NRBC BLD AUTO-RTO: 0 /100
OPIATES UR QL SCN: NEGATIVE
PCP UR QL SCN: POSITIVE
PLATELET # BLD AUTO: 252 10E9/L (ref 150–450)
POTASSIUM SERPL-SCNC: 4.1 MMOL/L (ref 3.4–5.3)
PROT SERPL-MCNC: 7.6 G/DL (ref 6.8–8.8)
RBC # BLD AUTO: 4.32 10E12/L (ref 3.8–5.2)
SALICYLATES SERPL-MCNC: <2 MG/DL
SODIUM SERPL-SCNC: 139 MMOL/L (ref 133–144)
WBC # BLD AUTO: 11.7 10E9/L (ref 4–11)

## 2018-04-11 PROCEDURE — 96374 THER/PROPH/DIAG INJ IV PUSH: CPT

## 2018-04-11 PROCEDURE — 70450 CT HEAD/BRAIN W/O DYE: CPT

## 2018-04-11 PROCEDURE — 99220 ZZC INITIAL OBSERVATION CARE,LEVL III: CPT | Performed by: INTERNAL MEDICINE

## 2018-04-11 PROCEDURE — 25000128 H RX IP 250 OP 636: Performed by: EMERGENCY MEDICINE

## 2018-04-11 PROCEDURE — 99207 ZZC CDG-CODE CATEGORY CHANGED: CPT | Performed by: INTERNAL MEDICINE

## 2018-04-11 PROCEDURE — 93005 ELECTROCARDIOGRAM TRACING: CPT

## 2018-04-11 PROCEDURE — 80053 COMPREHEN METABOLIC PANEL: CPT | Performed by: EMERGENCY MEDICINE

## 2018-04-11 PROCEDURE — 80307 DRUG TEST PRSMV CHEM ANLYZR: CPT | Performed by: EMERGENCY MEDICINE

## 2018-04-11 PROCEDURE — 80329 ANALGESICS NON-OPIOID 1 OR 2: CPT | Performed by: EMERGENCY MEDICINE

## 2018-04-11 PROCEDURE — 99285 EMERGENCY DEPT VISIT HI MDM: CPT | Mod: 25

## 2018-04-11 PROCEDURE — 80320 DRUG SCREEN QUANTALCOHOLS: CPT | Performed by: EMERGENCY MEDICINE

## 2018-04-11 PROCEDURE — 85025 COMPLETE CBC W/AUTO DIFF WBC: CPT | Performed by: EMERGENCY MEDICINE

## 2018-04-11 PROCEDURE — 80329 ANALGESICS NON-OPIOID 1 OR 2: CPT | Mod: 59 | Performed by: EMERGENCY MEDICINE

## 2018-04-11 PROCEDURE — 00000146 ZZHCL STATISTIC GLUCOSE BY METER IP

## 2018-04-11 PROCEDURE — 72125 CT NECK SPINE W/O DYE: CPT

## 2018-04-11 PROCEDURE — 84703 CHORIONIC GONADOTROPIN ASSAY: CPT | Performed by: EMERGENCY MEDICINE

## 2018-04-11 RX ORDER — AZITHROMYCIN 250 MG/1
500 TABLET, FILM COATED ORAL ONCE
Status: DISCONTINUED | OUTPATIENT
Start: 2018-04-11 | End: 2018-04-11

## 2018-04-11 RX ORDER — NALOXONE HYDROCHLORIDE 0.4 MG/ML
0.1 INJECTION, SOLUTION INTRAMUSCULAR; INTRAVENOUS; SUBCUTANEOUS ONCE
Status: COMPLETED | OUTPATIENT
Start: 2018-04-11 | End: 2018-04-11

## 2018-04-11 RX ORDER — AZITHROMYCIN 250 MG/1
250 TABLET, FILM COATED ORAL DAILY
Qty: 4 TABLET | Refills: 0 | Status: SHIPPED | OUTPATIENT
Start: 2018-04-12 | End: 2018-04-11

## 2018-04-11 RX ADMIN — NALOXONE HYDROCHLORIDE 0.1 MG: 0.4 INJECTION, SOLUTION INTRAMUSCULAR; INTRAVENOUS; SUBCUTANEOUS at 18:18

## 2018-04-11 ASSESSMENT — ENCOUNTER SYMPTOMS
NECK PAIN: 1
HEADACHES: 1
CONFUSION: 1

## 2018-04-11 NOTE — ED PROVIDER NOTES
"  History     Chief Complaint:  Head Injury    HPI   Rima Diaz is a 23 year old female with a history of suicidal ideation who presents to the ED for evaluation of a head injury. The patient's  reports that the patient slipped on some ice and fell 3 days ago. The lost consciousness for a few seconds at that time, but was not seen in the ED. This afternoon while at home with her mother she fell over again. When her  got home around 1700, he noticed that she was somewhat out of it, and thus they brought her to the ED.     Here in the ED, she is confused and unable to remember falling. She reports some neck pain and head \"pressure\" since she fell 3 days ago. She denies any other complaints or symptoms. Of note, the patient's mother denies seeing her take any medications other than a CBD (cannabis) gummy.    Allergies:  NKDA    Medications:    Zantac    Past Medical History:    ADHD  Anxiety  Depression  Suicidal ideation  Psoriasis  Cannabis dependence  Homicidal ideation    Past Surgical History:     section    Family History:    Sister: suicidal ideation    Social History:  Marital Status:  Single [1]  Former Smoker  Recovering alcoholic    Review of Systems   Unable to perform ROS: Mental status change   Musculoskeletal: Positive for neck pain.   Neurological: Positive for headaches.   Psychiatric/Behavioral: Positive for confusion.     Physical Exam     Patient Vitals for the past 24 hrs:   BP Temp Temp src Heart Rate Resp SpO2 Height Weight   18 2045 135/89 - - 84 14 99 % - -   18 1900 112/72 - - - - - - -   18 1845 118/61 - - 105 - 100 % - -   18 1830 115/70 - - 87 16 99 % - -   18 1815 111/70 - - 94 - 100 % - -   18 1800 124/76 - - - - 100 % - -   18 1747 125/80 97.6  F (36.4  C) Oral 94 14 100 % 1.626 m (5' 4\") 49.9 kg (110 lb)     Physical Exam  General: somnolent, but arousable.    Head: No signs of trauma.   Mouth/Throat: Oropharynx is " clear and moist.   Eyes: Conjunctivae are normal. Pupils are equal, round, and reactive to light.   Neck: Normal range of motion. No nuchal rigidity. No cervical adenopathy  CV: Normal rate and regular rhythm.    Resp: Effort normal and breath sounds normal. No respiratory distress.   GI: Soft. There is no tenderness.  No rebound or guarding.  Normal bowel sounds.    MSK: No tenderness to extremities.  Normal range of motion. Chronic neck pain present.  Neuro: The patient is alert but very poor historian, does not answer most questions.   PERRLA, EOMI, strength in upper/lower extremities normal and symmetrical.   Sensation normal. Speech normal.  Skin:  Diffuse psoriasis noted.      Emergency Department Course   ECG:  Indication: Altered Mental Status  Time: 1809  Vent. Rate 103 bpm. SD interval 156. QRS duration 94. QT/QTc 368/482. P-R-T axis 70 7 35.  Sinus tachycardia. Possible left atrial enlargement. Incomplete RBBB. Borderline ECG. Read time: 1815    Imaging:  Radiographic findings were communicated with the patient who voiced understanding of the findings.  CT Head without contrast:   Normal CT scan of the head, as per radiology.    CT Cervical Spine without contrast:   No evidence of acute fracture or subluxation in the cervical spine, as per radiology.    Laboratory:  CBC: WBC: 11.7 (H), HGB: 12.7, PLT: 252  CMP: All WNL (Creatinine: 0.59)    Drug abuse screen: benzodiazepines positive (A), PCP positive (A), o/w negative    Salicylate: <2  Acetaminophen: <2    HCG: negative    Alcohol level blood: <0.01    1819 Glucose by meter: 74    Interventions:  1818 Narcan 0.1 mg IV  Please see MAR for full list of medications administered in the ED.    Emergency Department Course:  Nursing notes and vitals reviewed. (1805) I performed an exam of the patient as documented above.     IV inserted. Medicine administered as documented above. Blood drawn. This was sent to the lab for further testing, results above.    The  patient provided a urine sample here in the emergency department. This was sent for laboratory testing, findings above.     The patient was sent for a Head CT & Cervical Spine CT while in the emergency department, findings above.     EKG obtained in the ED, see results above.     The patient's nurse spoke with the , who noted to her that she has been taking 2 of her cannabis gummies daily for the past week. He notes that she acts tired like this occasionally when she has had them.    (1938) I reevaluated the patient and provided an update in regards to her ED course.      (1942) The patient now told her  that she did take a full box of Coricidin cold medicine earlier today.    (1949) I consulted with Poison Control regarding the patient's history and presentation here in the emergency department.    (2202)  I consulted with Dr. Red of the hospitalist services. They are in agreement to accept the patient for admission.    (2208) I reevaluated the patient and provided an update in regards to her ED course.      Findings and plan explained to the Patient and spouse who consents to admission. Discussed the patient with Dr. Red, who will admit the patient to a observation bed for further monitoring, evaluation, and treatment.    Impression & Plan      Medical Decision Making:  Rima Diaz is a 23 year old female who presents due to altered mental status.  The patient apparently had a fall a few days ago, but had been doing fairly well since.  Her  noted another fall shortly prior to arrival, and the patient was acting somewhat off, so he brought her to the emergency department.  Given her odd behavior on arrival, she was initially seen in the stabilization bay.  There is no obvious signs of trauma on exam and the patient seemed fairly somnolent and confused.  Her clinical picture was more of one of ingestion rather than trauma.  I did obtain a CT scan of the head along with blood work  which was overall unremarkable.  I had repeated discussions with the patient and she denied taking anything initially, but ultimately did admit to taking a full box of Coricidin cough and cold medicine.  She did report that she was feeling depressed, and took it to try to feel better.  The patient's presentation would be consistent with ingestion of this.  Her Tylenol level was negative and she likely took a form without Tylenol in it.  I did speak with poison control.  Patient remained stable through her time in the ER, but continued to be somewhat confused and somnolent, so ultimately she will be admitted for continued monitoring to ensure that she clears with the plan for likely psychiatry consultation tomorrow.     Diagnosis:    ICD-10-CM   1. Intentional drug overdose, initial encounter (H) T50.902A     Disposition:  Admitted to Dr. Red    Scribmaria del rosario Disclosure:  I, Gabriela Chung, am serving as a scribe on 4/11/2018 at 6:00 PM to personally document services performed by Allan Blackwell MD based on my observations and the provider's statements to me.     4/11/2018    EMERGENCY DEPARTMENT       Allan Blackwell MD  04/13/18 [id number]

## 2018-04-11 NOTE — IP AVS SNAPSHOT
MRN:[id number]                      After Visit Summary   4/11/2018    Rima Diaz    MRN: [id number]           Thank you!     Thank you for choosing [facility] for your care. Our goal is always to provide you with excellent care. Hearing back from our patients is one way we can continue to improve our services. Please take a few minutes to complete the written survey that you may receive in the mail after you visit with us. Thank you!        Patient Information     Date Of Birth          1995        Designated Caregiver       Most Recent Value    Caregiver    Will someone help with your care after discharge? yes    Name of designated caregiver Emigdio    Phone number of caregiver [phone]    Caregiver address [address], [address] [place] [address]      About your hospital stay     You were admitted on:  April 11, 2018 You last received care in the:  [facility] [address] [facility]    You were discharged on:  April 12, 2018        Reason for your hospital stay       You were admitted with ingestion                  Who to Call     For medical emergencies, please call 911.  For non-urgent questions about your medical care, please call your primary care provider or clinic, None          Attending Provider     Provider Specialty    Allan Blackwell MD Emergency Medicine    Neda, Opal Contreras MD Internal Medicine       Primary Care Provider Fax #    Physician No Ref-Primary [phone]      After Care Instructions     Activity - Up ad rj           Additional Discharge Instructions       Follow up with [facility]            Advance Diet as Tolerated       Follow this diet upon discharge: Orders Placed This Encounter      Regular Diet Adult            General info for SNF       Length of Stay Estimate: Short Term Care: Estimated # of Days <30  Condition at Discharge: Improving  Level of care:skilled   Rehabilitation Potential: Excellent  Admission H&P remains  "valid and up-to-date: Yes  Recent Chemotherapy: N/A  Use Nursing Home Standing Orders: N/A                  Follow-up Appointments     Follow Up and recommended labs and tests       Follow up with [facility] as inpatient and then with outpatient psychiatry and chemical dependency evaluation.                  Pending Results     No orders found for last 3 day(s).            Statement of Approval     Ordered          18 1124  I have reviewed and agree with all the recommendations and orders detailed in this document.  EFFECTIVE NOW     Approved and electronically signed by:  Dennis Cuellar MD             Admission Information     Date & Time Provider Department Dept. Phone    2018 Opal Red MD [facility] [address] [facility] [phone]      Your Vitals Were     Blood Pressure Pulse Temperature Respirations Height Weight    106/62 (BP Location: Left arm) 63 98  F (36.7  C) (Oral) 16 1.626 m (5' 4\") 46.9 kg (103 lb 6.3 oz)    Pulse Oximetry BMI (Body Mass Index)                99% 17.75 kg/m2          [employer] Information     [employer] lets you send messages to your doctor, view your test results, renew your prescriptions, schedule appointments and more. To sign up, go to [email or web address] . Click on \"Log in\" on the left side of the screen, which will take you to the Welcome page. Then click on \"Sign up Now\" on the right side of the page.     You will be asked to enter the access code listed below, as well as some personal information. Please follow the directions to create your username and password.     Your access code is: [email or web address]  Expires: 2018 11:46 AM     Your access code will  in 90 days. If you need help or a new code, please call your [facility] clinic or [phone].        Care EveryWhere ID     This is your Care EveryWhere ID. This could be used by other organizations to access your [facility] medical records  [id number]        Equal " Access to Services     [facility]: Jono Barnes, waadiada luqadaha, qaybmeghan randolph. So [facility] [phone].    ATENCIÓN: Si habla español, tiene a mckeon disposición servicios gratuitos de asistencia lingüística. Llame al [phone].    We comply with applicable federal civil rights laws and [place] laws. We do not discriminate on the basis of race, color, national origin, age, disability, sex, sexual orientation, or gender identity.               Review of your medicines      Notice     You have not been prescribed any medications.             Protect others around you: Learn how to safely use, store and throw away your medicines at www.disposemymeds.org.             Medication List: This is a list of all your medications and when to take them. Check marks below indicate your daily home schedule. Keep this list as a reference.      Notice     You have not been prescribed any medications.

## 2018-04-12 ENCOUNTER — HOSPITAL ENCOUNTER (INPATIENT)
Facility: CLINIC | Age: 23
LOS: 4 days | Discharge: HOME | End: 2018-04-16
Attending: PSYCHIATRY & NEUROLOGY | Admitting: PSYCHIATRY & NEUROLOGY
Payer: COMMERCIAL

## 2018-04-12 VITALS
HEART RATE: 63 BPM | DIASTOLIC BLOOD PRESSURE: 62 MMHG | WEIGHT: 103.4 LBS | RESPIRATION RATE: 16 BRPM | TEMPERATURE: 98 F | BODY MASS INDEX: 17.65 KG/M2 | SYSTOLIC BLOOD PRESSURE: 106 MMHG | OXYGEN SATURATION: 99 % | HEIGHT: 64 IN

## 2018-04-12 DIAGNOSIS — L40.9 PSORIASIS: Primary | ICD-10-CM

## 2018-04-12 PROBLEM — T65.92XA: Status: ACTIVE | Noted: 2018-04-12

## 2018-04-12 PROBLEM — T14.91XA ATTEMPTED SUICIDE (H): Status: ACTIVE | Noted: 2018-04-12

## 2018-04-12 PROCEDURE — 25000132 ZZH RX MED GY IP 250 OP 250 PS 637: Performed by: INTERNAL MEDICINE

## 2018-04-12 PROCEDURE — 40000916 ZZH STATISTIC SITTER, NIGHT HOURS

## 2018-04-12 PROCEDURE — 25000132 ZZH RX MED GY IP 250 OP 250 PS 637: Performed by: PHYSICIAN ASSISTANT

## 2018-04-12 PROCEDURE — 25000132 ZZH RX MED GY IP 250 OP 250 PS 637: Performed by: PSYCHIATRY & NEUROLOGY

## 2018-04-12 PROCEDURE — 12400007 ZZH R&B MH INTERMEDIATE UMMC

## 2018-04-12 PROCEDURE — 99217 ZZC OBSERVATION CARE DISCHARGE: CPT | Performed by: INTERNAL MEDICINE

## 2018-04-12 PROCEDURE — 99222 1ST HOSP IP/OBS MODERATE 55: CPT | Performed by: PSYCHIATRY & NEUROLOGY

## 2018-04-12 PROCEDURE — G0378 HOSPITAL OBSERVATION PER HR: HCPCS

## 2018-04-12 RX ORDER — ACETAMINOPHEN 325 MG/1
650 TABLET ORAL EVERY 4 HOURS PRN
Status: DISCONTINUED | OUTPATIENT
Start: 2018-04-12 | End: 2018-04-12 | Stop reason: HOSPADM

## 2018-04-12 RX ORDER — MOMETASONE FUROATE 1 MG/G
OINTMENT TOPICAL DAILY
Status: DISCONTINUED | OUTPATIENT
Start: 2018-04-12 | End: 2018-04-12 | Stop reason: HOSPADM

## 2018-04-12 RX ORDER — FLUOCINONIDE 0.5 MG/G
OINTMENT TOPICAL 2 TIMES DAILY
Status: DISCONTINUED | OUTPATIENT
Start: 2018-04-12 | End: 2018-04-12 | Stop reason: HOSPADM

## 2018-04-12 RX ORDER — HYDROXYZINE HYDROCHLORIDE 25 MG/1
25 TABLET, FILM COATED ORAL EVERY 4 HOURS PRN
Status: DISCONTINUED | OUTPATIENT
Start: 2018-04-12 | End: 2018-04-16 | Stop reason: HOSPADM

## 2018-04-12 RX ORDER — ACETAMINOPHEN 325 MG/1
650 TABLET ORAL EVERY 4 HOURS PRN
Status: DISCONTINUED | OUTPATIENT
Start: 2018-04-12 | End: 2018-04-16 | Stop reason: HOSPADM

## 2018-04-12 RX ORDER — HYDROXYZINE HYDROCHLORIDE 25 MG/1
25-50 TABLET, FILM COATED ORAL 3 TIMES DAILY PRN
Status: DISCONTINUED | OUTPATIENT
Start: 2018-04-12 | End: 2018-04-12 | Stop reason: HOSPADM

## 2018-04-12 RX ORDER — FLUOCINONIDE 0.5 MG/G
OINTMENT TOPICAL 2 TIMES DAILY
Status: DISCONTINUED | OUTPATIENT
Start: 2018-04-12 | End: 2018-04-16 | Stop reason: HOSPADM

## 2018-04-12 RX ORDER — ONDANSETRON 4 MG/1
4 TABLET, ORALLY DISINTEGRATING ORAL EVERY 6 HOURS PRN
Status: DISCONTINUED | OUTPATIENT
Start: 2018-04-12 | End: 2018-04-12 | Stop reason: HOSPADM

## 2018-04-12 RX ORDER — OLANZAPINE 10 MG/2ML
10 INJECTION, POWDER, FOR SOLUTION INTRAMUSCULAR
Status: DISCONTINUED | OUTPATIENT
Start: 2018-04-12 | End: 2018-04-16 | Stop reason: HOSPADM

## 2018-04-12 RX ORDER — ONDANSETRON 4 MG/1
4 TABLET, ORALLY DISINTEGRATING ORAL EVERY 6 HOURS PRN
Status: DISCONTINUED | OUTPATIENT
Start: 2018-04-12 | End: 2018-04-16 | Stop reason: HOSPADM

## 2018-04-12 RX ORDER — PHENOBARBITAL 64.8 MG/1
129.6 TABLET ORAL 2 TIMES DAILY
Status: DISCONTINUED | OUTPATIENT
Start: 2018-04-12 | End: 2018-04-12

## 2018-04-12 RX ORDER — BISACODYL 10 MG
10 SUPPOSITORY, RECTAL RECTAL DAILY PRN
Status: DISCONTINUED | OUTPATIENT
Start: 2018-04-12 | End: 2018-04-16 | Stop reason: HOSPADM

## 2018-04-12 RX ORDER — MOMETASONE FUROATE 1 MG/G
CREAM TOPICAL DAILY
Status: DISCONTINUED | OUTPATIENT
Start: 2018-04-12 | End: 2018-04-12 | Stop reason: ALTCHOICE

## 2018-04-12 RX ORDER — MOMETASONE FUROATE 1 MG/G
CREAM TOPICAL DAILY
Status: DISCONTINUED | OUTPATIENT
Start: 2018-04-12 | End: 2018-04-16 | Stop reason: HOSPADM

## 2018-04-12 RX ORDER — PHENOBARBITAL 32.4 MG/1
32.4 TABLET ORAL AT BEDTIME
Status: COMPLETED | OUTPATIENT
Start: 2018-04-12 | End: 2018-04-12

## 2018-04-12 RX ORDER — OLANZAPINE 10 MG/1
10 TABLET ORAL
Status: DISCONTINUED | OUTPATIENT
Start: 2018-04-12 | End: 2018-04-16 | Stop reason: HOSPADM

## 2018-04-12 RX ORDER — ACETAMINOPHEN 650 MG/1
650 SUPPOSITORY RECTAL EVERY 4 HOURS PRN
Status: DISCONTINUED | OUTPATIENT
Start: 2018-04-12 | End: 2018-04-12 | Stop reason: HOSPADM

## 2018-04-12 RX ORDER — ONDANSETRON 2 MG/ML
4 INJECTION INTRAMUSCULAR; INTRAVENOUS EVERY 6 HOURS PRN
Status: DISCONTINUED | OUTPATIENT
Start: 2018-04-12 | End: 2018-04-12 | Stop reason: HOSPADM

## 2018-04-12 RX ORDER — PHENOBARBITAL 60 MG/1
120 TABLET ORAL 2 TIMES DAILY
Status: DISCONTINUED | OUTPATIENT
Start: 2018-04-12 | End: 2018-04-12

## 2018-04-12 RX ORDER — ALUMINA, MAGNESIA, AND SIMETHICONE 2400; 2400; 240 MG/30ML; MG/30ML; MG/30ML
30 SUSPENSION ORAL EVERY 4 HOURS PRN
Status: DISCONTINUED | OUTPATIENT
Start: 2018-04-12 | End: 2018-04-16 | Stop reason: HOSPADM

## 2018-04-12 RX ORDER — NALOXONE HYDROCHLORIDE 0.4 MG/ML
.1-.4 INJECTION, SOLUTION INTRAMUSCULAR; INTRAVENOUS; SUBCUTANEOUS
Status: DISCONTINUED | OUTPATIENT
Start: 2018-04-12 | End: 2018-04-12 | Stop reason: HOSPADM

## 2018-04-12 RX ORDER — POLYETHYLENE GLYCOL 3350 17 G/17G
17 POWDER, FOR SOLUTION ORAL DAILY PRN
Status: DISCONTINUED | OUTPATIENT
Start: 2018-04-12 | End: 2018-04-12 | Stop reason: HOSPADM

## 2018-04-12 RX ADMIN — FLUOCINONIDE: 0.5 OINTMENT TOPICAL at 01:32

## 2018-04-12 RX ADMIN — MOMETASONE FUROATE: 1 CREAM TOPICAL at 17:59

## 2018-04-12 RX ADMIN — PHENOBARBITAL 32.4 MG: 32.4 TABLET ORAL at 22:47

## 2018-04-12 RX ADMIN — MOMETASONE FUROATE: 1 OINTMENT TOPICAL at 09:35

## 2018-04-12 ASSESSMENT — ACTIVITIES OF DAILY LIVING (ADL)
LAUNDRY: WITH SUPERVISION
DRESS: INDEPENDENT
ORAL_HYGIENE: INDEPENDENT
GROOMING: INDEPENDENT

## 2018-04-12 NOTE — PROGRESS NOTES
Rima has significant areas of elevated scaling inflamed skin. Condition appears to be psoriasis, about half of her trunk and chest are covered, large areas present on back, legs, and arms, including scalp and forehead/face. Some areas are opened with evidence of patient scratching. Patient reports that she has not been using anything to treat the skin  condition. This writer has spoken with Joie BRAUN via telephone. Joie has ordered creams that were initiated at [facility] twenty four hours ago that will be applied asap. Patient reports being very tired and nauseated. Rima has been offered zofran for nausea and she has declined. Rima is noted to have some nystagmus when she opens her eyes to speak to me.  Rima's skin integrity is significantly compromised secondary to multiple open areas. Patient has been educated on good hand hygiene. Joie EASLEY to evaluate tomorrow, dermatology consult ordered, order for private room obtained until medical assessment of skin condition can be completed. Nursing to continue to monitor and assess.

## 2018-04-12 NOTE — ED NOTES
"Spouse Emigdio states to RN, \"I have to get out of here and take a walk. I'm hungry and just cant deal with this right now. She needs help with her drug abuse and she wont talk to me, so I'm gonna go now. \" Social work consult discussed. Emigdio states he will return to see his wife later; phone number taken (progress note). Patient groggy, states she is feeling \"ok\". Denies pain or any other concerns. Awaiting bed placement at this time. VSS.  "
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
## 2018-04-12 NOTE — ED NOTES
"Pt call light answered.  Pt's  said that the pt told him that she took a box of \"cold medicine\" MD advised and at bedside  "
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
## 2018-04-12 NOTE — PLAN OF CARE
Problem: Patient Care Overview  Goal: Plan of Care/Patient Progress Review  Outcome: Adequate for Discharge Date Met: 04/12/18  &OX4, VSS on RA. Denies pain, n &V. C/o headache but declining meds. Up to bathroom with SBA. IV saline locked. Denying SI.Bedside attendant present.  Refusing to transfer to a locked unit prompting initiation of 72 hour hold. On regular diet. Psoriasis all over the trunk and the limbs, medicated with ointments. Transfer to [facility] expected around 1515 pm today. Report called to [facility] nurse. Continue to monitor.

## 2018-04-12 NOTE — PROGRESS NOTES
RECEIVING UNIT ED HANDOFF REVIEW    ED Nurse Handoff Report was reviewed by: Tyrone Mcneil on April 11, 2018 at 11:46 PM

## 2018-04-12 NOTE — PHARMACY-ADMISSION MEDICATION HISTORY
Admission medication history interview status for the 4/11/2018  admission is complete. See EPIC admission navigator for prior to admission medications     Medication history source reliability:Moderate-     Actions taken by pharmacist (provider contacted, etc):None     Additional medication history information not noted on PTA med list :  - reports not taking regular medications.   -In past week she starting taking pain medications obtained on own, possibly opioid containing; she may have taken Coricidin cold medicaiton. She has been taking CBD gummies in past week as well.    Medication reconciliation/reorder completed by provider prior to medication history? No    Time spent in this activity: 15 min    Prior to Admission medications    Not on File

## 2018-04-12 NOTE — ED NOTES
Patient aware that she will go to CT. States she can go to the bathroom after the CT. Unable to void on bedpan per EDT. Spouse at bedside.

## 2018-04-12 NOTE — PLAN OF CARE
Problem: Patient Care Overview  Goal: Plan of Care/Patient Progress Review  Outcome: No Change  -diagnostic tests and consults completed and resulted: pending  -vital signs normal or at patient baseline: met  -tolerating oral intake to maintain hydration:, not assessed, pt somnolent.   -safe disposition plan has been identified: pending
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
## 2018-04-12 NOTE — PROGRESS NOTES
04/12/18 1726   Patient Belongings   Did you bring any home meds/supplements to the hospital?  Yes   Disposition of meds  Other (see comment)  (gave to nurse )   Patient Belongings clothing;shoes   Disposition of Belongings Locker   Belongings Search Yes   Clothing Search Yes   Second Staff Gwen MERIDA        BELONGINGS:  Aqua hoodie with strings   Grey sweat pants with strings  Grey/blue striped dee top   Grey shirt   Blue/white striped underwear     Medications gave to nurse   Fuocinonide ointment  Mometasone furoate ointment     A               Admission:  I am responsible for any personal items that are not sent to the safe or pharmacy.  [facility] is not responsible for loss, theft or damage of any property in my possession.    Signature:  _________________________________ Date: _______  Time: _____                                              Staff Signature:  ____________________________ Date: ________  Time: _____      2nd Staff person, if patient is unable/unwilling to sign:    Signature: ________________________________ Date: ________  Time: _____     Discharge:  [facility] has returned all of my personal belongings:    Signature: _________________________________ Date: ________  Time: _____                                          Staff Signature:  ____________________________ Date: ________  Time: _____

## 2018-04-12 NOTE — PLAN OF CARE
Problem: Patient Care Overview  Goal: Plan of Care/Patient Progress Review  Outcome: No Change  Pt alert to self only. Somnolent, arouses to voice. VSS on RA. Stand by assist. Regular diet.  Denies suicidal ideation. Denies nausea. Has slight head ache, declines intervention. Psoriasis plaques on arms, legs, torso, back, chest.  Psych consult pending. Attendant present. Will continue to monitor.     -diagnostic tests and consults completed and resulted -pending  -vital signs normal or at patient baseline -met  -tolerating oral intake to maintain hydration - BEBE, pt somnolent  -safe disposition plan has been identified -pending

## 2018-04-12 NOTE — PROGRESS NOTES
HE BLS ride set for 1515 to take pt to [address]. [facility]CS faxed. BLS due to pt being on a 72 hour hold.     MARCELLA Puente, LGSW  [id number]

## 2018-04-12 NOTE — ED NOTES
[facility]  ED Nurse Handoff Report    ED Chief complaint: Head Injury (slipped on ice a few days ago) and Altered Mental Status (pt has been taking for the past few days 2 CBD gummies that she ordered on line, pt became confused and lethargic today at 5pm)      ED Diagnosis:   Final diagnoses:   None       Code Status: Full Code    Allergies: No Known Allergies    Activity level - Baseline/Home:  Independent    Activity Level - Current:   Stand with Assist; able to pivot to bedside commode.     Needed?: No    Isolation: No  Infection: Not Applicable    Bariatric?: No    Vital Signs:   Vitals:    04/11/18 1815 04/11/18 1830 04/11/18 1845 04/11/18 1900   BP: 111/70 115/70 118/61 112/72   Resp:  16     Temp:       TempSrc:       SpO2: 100% 99% 100%    Weight:       Height:           Cardiac Rhythm: ,        Pain level:  0    Is this patient confused?: No     Patient Report: Initial Complaint: Confusion, lethargy per 's report. Patient reports taking CBD gummies and cold medicine today. Patient fell and hit her head today. CT was clear. CT for spine.   Focused Assessment: Patient is groggy, unable to keep her eyes open, reports feeling like she can't see. CT completed for altered mental status. Patient is A&Ox4. Patient has eczema/dry skin all over her body. Denies pain. Stands with assist. Continues to be drowsy and unable to keep her eyes open.    Tests Performed: CT x2, labs, urine drug screen  Abnormal Results: see results tab  Treatments provided: IV fluids, CT x2, cardiac monitoring, EKG    Family Comments: spouse at bedside, Emigdio    OBS brochure/video discussed/provided to patient: Yes    ED Medications:   Medications   naloxone (NARCAN) injection 0.1 mg (0.1 mg Intravenous Given 4/11/18 1818)       Drips infusing?:  No    For the majority of the shift this patient was Green.   Interventions performed were n/a.    Severe Sepsis OR Septic Shock Diagnosis Present: No      ED  NURSE PHONE NUMBER:[phone]
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
## 2018-04-12 NOTE — IP AVS SNAPSHOT
30    [address]    [place] [address]    Phone:  [phone]                                       After Visit Summary   4/12/2018    Rima Diaz    MRN: [id number]           After Visit Summary Signature Page     I have received my discharge instructions, and my questions have been answered. I have discussed any challenges I see with this plan with the nurse or doctor.    ..........................................................................................................................................  Patient/Patient Representative Signature      ..........................................................................................................................................  Patient Representative Print Name and Relationship to Patient    ..................................................               ................................................  Date                                            Time    ..........................................................................................................................................  Reviewed by Signature/Title    ...................................................              ..............................................  Date                                                            Time

## 2018-04-12 NOTE — IP AVS SNAPSHOT
MRN:[id number]                      After Visit Summary   4/12/2018    Rima Diaz    MRN: [id number]           Thank you!     Thank you for choosing [facility] for your care. Our goal is always to provide you with excellent care.        Patient Information     Date Of Birth          1995        Designated Caregiver       Most Recent Value    Caregiver    Will someone help with your care after discharge? yes    Name of designated caregiver Kathy (mom)    Phone number of caregiver pt unsure    Caregiver address pt unsure      About your hospital stay     You were admitted on:  April 12, 2018 You last received care in the:  [facility] 30[facility]    You were discharged on:  April 16, 2018       Who to Call     For medical emergencies, please call 911.  For non-urgent questions about your medical care, please call your primary care provider or clinic, None          Attending Provider     Provider Specialty    Avila Serrano MD Psychiatry       Primary Care Provider Fax #    Physician No Ref-Primary [phone]      Follow-up Appointments     Follow Up ([facility])       Follow up with dermatology, at (location with clinic name or city) [facility] dermatology clinic, within 2 weeks of discharge to establish care and for further treatment of psoriasis. For appointments call Appointments: [phone] and ask to see a resident of the derm clinic in an acute care slot.     Appointments on [place] and/or [facility] (with [facility] or [facility] provider or service). Call [phone] if you haven't heard regarding these appointments within 7 days of discharge.                  Your next 10 appointments already scheduled     Apr 30, 2018  3:00 PM CDT   Office Visit with Hali Cuellar MD   [facility] ([address] [address] [phone]           Bring a current list of meds and any records pertaining to this visit. For  Physicals, please bring immunization records and any forms needing to be filled out. Please arrive 10 minutes early to complete paperwork.              Additional Services     Dermatology Referral       [facility] derm, resident spot, ideally 04/23 or on Monday (04/30) in acute care spot                  Further instructions from your care team          Behavioral Discharge Planning and Instructions      Summary:  You were admitted on 4/12/2018  due to concerns that you had overdosed on xanax and cough syrup.  You were treated by Dr. Avila Serrano MD. You were assessed for the need for phenobarbitrol taper but did not need this. You were given a dose of Effexor but then said you did not need medications.  You did want to do outpatient substance use treatment.  You were treated and assessed for psoriasis.  A Mother -Baby program was explored with you but you stated you will get counseling at the substance use program. You were discharged on 4/16/18 [facility] as transported by your .      Principal Diagnosis:     1.  Major depressive disorder, recurrent, severe.   2.  Polysubstance use disorder.   3.  Rule out posttraumatic stress disorder.   4.  Likely borderline personality disorder.        Health Care Follow-up Appointments:   You have Blue Cross insurance through your father.  You also have medical assistance through [facility].Use this service for transportation to your health care appointments.  [facility] - - [phone] or the nurse line if need special accommodations [phone]      You are not taking any medications so no psychiatric appointments were set for you.      Attend your substance use evaluton on Monday, April 23, 2018.  [facility]  [address] [address]  Ph: [phone]    You were referred to dermatology but attempts to schedule this appointment were not successful. See below for a primary care appointment where you can talk about a referral to  dermatology.  Follow Up ([facility])       Follow up with dermatology, at (location with clinic name or city) [facility] dermatology clinic, within 2 weeks of discharge to establish care and for further treatment of psoriasis. For appointments call Appointments: [phone] and ask to see a resident of the derm clinic in an acute care slot.     Appointments on [place] and/or [facility] (with [facility] or [facility] provider or service). Call [phone] if you haven't heard regarding these appointments within 7 days of discharge.                Attend this new patient visit to establish care, for a post hospital visit and help you manage the dermatology referral.  Apr 30, 2018  3:00 PM CDT   Office Visit with Hali Cuellar MD   [facility] ([address] [address] [phone]              Attend all scheduled appointments with your outpatient providers. Call at least 24 hours in advance if you need to reschedule an appointment to ensure continued access to your outpatient providers.   Major Treatments, Procedures and Findings:  You were provided with: a psychiatric assessment, assessed for medical stability, medication evaluation and/or management and group therapy      Your pregnancy test was negative.  HCG Qualitative Serum Negative  NEG^Negative  Final 04/11/2018  6:05 PM [facility]   Comment:       This is your drug screen results.    Benzodiazepine Qual Urine Positive (A) NEG^Negative  Final 04/11/2018  8:45 PM [facility]         Symptoms to Report: mood getting worse or thoughts of suicide    Early warning signs can include: increased thoughts or behaviors of suicide or self-harm     Safety and Wellness:  Take all medicines as directed.  Make no changes unless your doctor suggests them.      Follow treatment recommendations.  Refrain from alcohol and non-prescribed drugs.  If there is a concern for safety, call [phone].    Resources:  "  [facility] Crisis (COPE) Response - Adult [phone]        The Mother - Baby HopeLine - 612.873.HOPE (7494)  A free, telephone triage and resource line for families experiencing stress or challenges related to mental health symptoms during and after pregnancy, or parenting young childrens.       [place] [facility] ([facility])  [facility] connects people seeking recovery to resources that help foster and sustain long-term recovery.  Whether you are seeking resources for treatment, transportation, housing, job training, education, health care or other pathways to recovery, [facility] is a great place to start.  [phone]. Www.[facility].mensch[employer]ch[employer] publishing      The treatment team has appreciated the opportunity to work with you.     If you have any questions or concerns our unit number is [phone].  You may be receiving a follow-up phone call within the next three days from a representative from behavioral health.    You have identified the best phone number to reach you as [phone]          Pending Results     No orders found from 4/10/2018 to 4/13/2018.            Statement of Approval     Ordered          04/16/18 1001  I have reviewed and agree with all the recommendations and orders detailed in this document.  EFFECTIVE NOW     Approved and electronically signed by:  Avila Serrano MD             Admission Information     Date & Time Provider Department Dept. Phone    4/12/2018 Avila Serrano MD [facility] 30NR [phone]      Your Vitals Were     Blood Pressure Pulse Temperature Respirations Height Weight    103/60 62 97  F (36.1  C) 16 1.626 m (5' 4\") 48.5 kg (107 lb)    Pulse Oximetry BMI (Body Mass Index)                98% 18.37 kg/m2          [facility] Information     [facility] lets you send messages to your doctor, view your test results, renew your prescriptions, schedule appointments and more. To sign up, go to [email or web address] . Click on \"Log in\" on the left side of the screen, which " "will take you to the Welcome page. Then click on \"Sign up Now\" on the right side of the page.     You will be asked to enter the access code listed below, as well as some personal information. Please follow the directions to create your username and password.     Your access code is: [email or web address]  Expires: 2018 11:46 AM     Your access code will  in 90 days. If you need help or a new code, please call your [facility] clinic or [phone].        Care EveryWhere ID     This is your Care EveryWhere ID. This could be used by other organizations to access your [facility] medical records  [id number]        Equal Access to Services     RICKY ALVARENGA : Jono Barnes, billy dao, macho gooden, meghan lin. So [facility] [phone].    ATENCIÓN: Si habla español, tiene a mckeon disposición servicios gratuitos de asistencia lingüística. Llame al [phone].    We comply with applicable federal civil rights laws and [place] laws. We do not discriminate on the basis of race, color, national origin, age, disability, sex, sexual orientation, or gender identity.               Review of your medicines      START taking        Dose / Directions    fluocinonide 0.05 % ointment   Commonly known as:  LIDEX   Used for:  Psoriasis        Apply topically 2 times daily   Quantity:  30 g   Refills:  1       mometasone 0.1 % cream   Commonly known as:  ELOCON   Used for:  Psoriasis        Apply topically daily   Quantity:  45 g   Refills:  1            Where to get your medicines      These medications were sent to [facility] [place], [place] - [address]  [address] [address]     Phone:  [phone]     fluocinonide 0.05 % ointment    mometasone 0.1 % cream                Protect others around you: Learn how to safely use, store and throw away your medicines at www.disposemymeds.org.             Medication List: This is a list " of all your medications and when to take them. Check marks below indicate your daily home schedule. Keep this list as a reference.      Medications           Morning Afternoon Evening Bedtime As Needed    fluocinonide 0.05 % ointment   Commonly known as:  LIDEX   Apply topically 2 times daily   Last time this was given:  4/15/2018 11:42 AM                                mometasone 0.1 % cream   Commonly known as:  ELOCON   Apply topically daily   Last time this was given:  4/15/2018 11:41 AM

## 2018-04-13 PROCEDURE — 99221 1ST HOSP IP/OBS SF/LOW 40: CPT | Performed by: PHYSICIAN ASSISTANT

## 2018-04-13 PROCEDURE — 12400007 ZZH R&B MH INTERMEDIATE UMMC

## 2018-04-13 PROCEDURE — 99207 ZZC CONSULT E&M CHANGED TO INITIAL LEVEL: CPT | Performed by: PHYSICIAN ASSISTANT

## 2018-04-13 PROCEDURE — 25000132 ZZH RX MED GY IP 250 OP 250 PS 637: Performed by: PHYSICIAN ASSISTANT

## 2018-04-13 PROCEDURE — 99223 1ST HOSP IP/OBS HIGH 75: CPT | Mod: AI | Performed by: PSYCHIATRY & NEUROLOGY

## 2018-04-13 RX ORDER — VENLAFAXINE HYDROCHLORIDE 37.5 MG/1
37.5 TABLET, EXTENDED RELEASE ORAL AT BEDTIME
Status: DISCONTINUED | OUTPATIENT
Start: 2018-04-13 | End: 2018-04-16 | Stop reason: HOSPADM

## 2018-04-13 RX ADMIN — FLUOCINONIDE: 0.5 OINTMENT TOPICAL at 14:41

## 2018-04-13 RX ADMIN — MOMETASONE FUROATE: 1 CREAM TOPICAL at 09:53

## 2018-04-13 ASSESSMENT — ACTIVITIES OF DAILY LIVING (ADL)
LAUNDRY: WITH SUPERVISION
ORAL_HYGIENE: INDEPENDENT
GROOMING: INDEPENDENT
DRESS: INDEPENDENT
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
## 2018-04-13 NOTE — CONSULTS
Internal Medicine Initial Visit    Rima Diaz MRN# [id number]   Age: 23 year old YOB: 1995   Date of Admission: 2018    ADMIT DATE: 2018  DATE OF CONSULT: 2018    PCP: No Ref-Primary, Physician    REQUESTING SERVICE: Psychiatry  REASON FOR CONSULT: rash    CHIEF COMPLAINT: Psoriasis    HPI: Rima Diaz is a 23 year old female with a past medical history of depression, anxiety, polysubstance abuse, psoriasis who is admitted to [address] for depression w/ SI and suicide attempt, medically cleared at [facility].    Medicine was consulted for psoriasis. Discussed with patient, she notes she was diagnosed with psoriasis in about . Took creams which helped & she stopped them 2 years ago, had no new lesions. Got pregnant in 10/2016 and her lesions re appeared, did not take creams. She notes they were initially worse, now all over her body and she states that they are unchanged since delivery in 2017. She notes that they aren't bothersome unless she has flairs. She has flairs of joint pain, but none recently. She denies any fevers/chills. She notes she feels dizzy this AM from medication given last night. She denies any past medical history. No chest pain, dyspnea, cough, abdominal pain, nausea/vomiting, diarrhea, dysuria. No concern for STIs. No recent IVDU.     ROS:   10 point ROS asked and otherwise negative, with exceptions as noted above in HPI.     PMH:  Past Medical History:   Diagnosis Date     ADHD (attention deficit hyperactivity disorder)      Alcohol abuse      Anxiety      Benzodiazepine abuse, continuous      Depression      Dextromethorphan overdose, intentional self-harm, initial encounter (H) 2018     History of suicidal ideation      Opiate abuse, episodic      Psoriasis        PSH:  Past Surgical History:   Procedure Laterality Date      SECTION N/A 8/3/2017    Procedure:  SECTION;;  Surgeon: Ailvia Arizmendi MD;  Location:   L+D       MEDICATIONS    Current Facility-Administered Medications on File Prior to Encounter:  [DISCONTINUED] acetaminophen (TYLENOL) tablet 650 mg   [DISCONTINUED] acetaminophen (TYLENOL) Suppository 650 mg   [DISCONTINUED] naloxone (NARCAN) injection 0.1-0.4 mg   [DISCONTINUED] melatonin tablet 1 mg   [DISCONTINUED] ondansetron (ZOFRAN-ODT) ODT tab 4 mg   [DISCONTINUED] ondansetron (ZOFRAN) injection 4 mg   [DISCONTINUED] polyethylene glycol (MIRALAX/GLYCOLAX) Packet 17 g   [DISCONTINUED] fluocinonide (LIDEX) 0.05 % ointment   [DISCONTINUED] mometasone (ELOCON) 0.1 % ointment   [DISCONTINUED] hydrOXYzine (ATARAX) tablet 25-50 mg     No current outpatient prescriptions on file prior to encounter.    ALLERGIES:   No Known Allergies    FAMILY HISTORY:  Family History   Problem Relation Age of Onset     Suicide Other      sister 8/2014   Father with psoriasis    SOCIAL HISTORY:  Social History     Social History     Marital status: Single     Spouse name: N/A     Number of children: N/A     Years of education: N/A     Social History Main Topics     Smoking status: Former Smoker     Types: Other     Quit date: 11/2016     Smokeless tobacco: Never Used     Alcohol use 0.0 oz/week     0 Standard drinks or equivalent per week      Comment: recovering     Drug use: Yes     Special: Methamphetamines, Opiates, Amphetamines, Benzodiazepines, Hydrocodone, PCP      Comment: everyday - trying to quit     Sexual activity: Yes     Partners: Male     Birth control/ protection: None     Other Topics Concern     Not on file     Social History Narrative    4/11/2018  and lives with 38 yo  and infant son born August 2017.         Early history:  Rima was removed from her home and was placed in foster care with her brothers and sisters at an early age due to both parents using meth      Educational history:  11th grade at [employer], [employer] for the past year. She states she was kicked out of mainstream school for  "continued etoh intoxication at school. She states she is not currently passing her classes. She has an interest in becoming a .      Marital history:  single      Children:  none      Past living situation:  With bio parents, 3 brothers and 2 sisters and one dog in [place].      Occupational history:  student      Occupational history/current financial support:  none       history:  none                 Lives in home with  and son Gamaliel.     PHYSICAL EXAM:  Blood pressure 97/56, pulse 61, temperature 98.3  F (36.8  C), temperature source Tympanic, resp. rate 16, height 1.626 m (5' 4\"), weight 48.5 kg (107 lb), SpO2 98 %, not currently breastfeeding.    GENERAL: Alert and orientated x 3. Appears in no acute distress.   HEENT: Anicteric sclera. Mucous membranes moist and without lesions.   CV: RRR, S1S2, no murmurs appreciated.  RESPIRATORY: Respirations regular, even, and unlabored. Lungs CTAB with no wheezing, rales, rhonchi.   GI: Soft and non distended with normoactive bowel sounds present in all quadrants. No tenderness, rebound, guarding.   EXTREMITIES: No peripheral edema. 2+ bilateral pedal pulses.   NEUROLOGIC: CN II-XII grossly intact. No focal deficits.   MUSCULOSKELETAL: No joint swelling or tenderness.   SKIN: No jaundice. Entire back, abdomen and chest w/ well-demarcated salmon-colored plaques with silver scaling covering the face and scalp, arms, legs, and trunk. Multiple lesions similar in appearance on bilateral arms, bilateral legs, superior forehead and temporal region. There are miltiple excoriations consistent with scratching. No significant erythema, drainage or warmth consistent with infection.    LABS:  CMP  Recent Labs  Lab 04/11/18  1805      POTASSIUM 4.1   CHLORIDE 107   CO2 23   ANIONGAP 9   GLC 79   BUN 17   CR 0.59   GFRESTIMATED >90   GFRESTBLACK >90   MADDY 8.7   PROTTOTAL 7.6   ALBUMIN 4.1   BILITOTAL 0.6   ALKPHOS 79   AST 24   ALT 16 "     CBC  Recent Labs  Lab 04/11/18  1805   WBC 11.7*   RBC 4.32   HGB 12.7   HCT 39.3   MCV 91   MCH 29.4   MCHC 32.3   RDW 12.9        INRNo lab results found in last 7 days.    IMAGING: none to review from this admission    ASSESSMENT & RECOMMENDATIONS:  #Depression, anxiety, SI w/ suicide attempt: Medically cleared at [facility].   -Management per psychiatry  #Chronic plaque psoriasis: Strong family history. Diagnosed in 2011, no f/u and off creams x2 years. Symptoms worse at onset of last pregnancy (10/24/2016) and stable since that time. Extensive plaque psoriasis on back, abdomen chest, bilateral UE and LE, superior portion of forehead. No e/o infection.   -Continue Lidex  0.05% ointment to back, abdomen, bilateral UE and LE BID  -Continue mometasone cream to face  -Discussed with dermatology, will plan for OP visit and f/u to discuss long term medication management  #Polysubstance abuse: Notes abusing benzos. Took phenobarb last night, dizzy this AM which is common side effect. No other medications.    -Management per psychiatry    I appreciate the opportunity to participate in the care of this patient. Medicine will continue to follow rash and discuss ongoing f/u tomorrow. Please notify on call [facility] if any intercurrent medical issues arise.     Joie Tompkins PA-C

## 2018-04-14 PROCEDURE — 99231 SBSQ HOSP IP/OBS SF/LOW 25: CPT | Performed by: PHYSICIAN ASSISTANT

## 2018-04-14 PROCEDURE — 12400007 ZZH R&B MH INTERMEDIATE UMMC

## 2018-04-14 PROCEDURE — 90853 GROUP PSYCHOTHERAPY: CPT

## 2018-04-14 ASSESSMENT — ACTIVITIES OF DAILY LIVING (ADL)
DRESS: INDEPENDENT;SCRUBS (BEHAVIORAL HEALTH)
LAUNDRY: WITH SUPERVISION
GROOMING: INDEPENDENT
LAUNDRY: WITH SUPERVISION
DRESS: INDEPENDENT
ORAL_HYGIENE: INDEPENDENT
ORAL_HYGIENE: INDEPENDENT
GROOMING: INDEPENDENT

## 2018-04-14 NOTE — PROGRESS NOTES
Discussed care with patient.     The patient notes she will continue creams and ointments. Discussed depression associated with psoriasis and importance of establishing care with dermatology for consideration of biologic treatment modalities. She is agreeable.    #Chronic plaque psoriasis: Strong family history. Diagnosed in 2011, no f/u and off creams x2 years. Symptoms worse at onset of last pregnancy (10/24/2016) and stable since that time. Extensive plaque psoriasis on back, abdomen chest, bilateral UE and LE, superior portion of forehead. No e/o infection.   -Continue Lidex  0.05% ointment to back, abdomen, bilateral UE and LE BID  -Continue mometasone cream to face  -Discussed with dermatology, will plan for OP visit and f/u to discuss long term medication management, referral and f/u order placed in discharge with phone number to clinic    KADE Garcia    Time Spent on this Encounter   I spent 15 minutes on the unit/floor managing the care of Rima Diaz. Over 50% of my time was spent counseling the patient and/or coordinating care regarding services listed in this note.

## 2018-04-14 NOTE — PROGRESS NOTES
"   04/13/18 2100   Behavioral Health   Hallucinations denies / not responding to hallucinations   Thinking poor concentration   Orientation person: oriented;place: oriented   Memory baseline memory   Insight poor   Judgement impaired   Eye Contact at examiner   Affect full range affect   Mood mood is calm   Physical Appearance/Attire neat   Hygiene well groomed   Suicidality other (see comments)  (denies )   1. Wish to be Dead No   2. Non-Specific Active Suicidal Thoughts  No   3. Active Sucidal Ideation with any Methods (Not Plan) Without Intent to Act  No   4. Active Suicidal Ideation with Some Intent to Act, Without Specific Plan  No   5. Active Suicidal Ideation with Specific Plan and Intent  No   Self Injury other (see comment)  (denies )   Elopement (Pt is on elopement precaution )   Activity other (see comment)  (visible in the milieu )   Speech clear;coherent   Activities of Daily Living   Hygiene/Grooming independent   Oral Hygiene independent   Dress independent   Laundry with supervision   Room Organization independent       Pt denied SI and SIB.  Pt reported feeling depressed (7) and confused (7) \" I don't know I think it's coming off of zenex.\"  Pt reported overall \" I feel pretty good.\"  Pt seems calm, ate supper and was visible in the milieu.  Pt daily goal \" take shower.\"  Pt goal after discharge \"continue my therapy.\"  Pt is independent with ADL's.  Pt reported no nutritional concerns.  Pt wants visitors.    "
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
## 2018-04-14 NOTE — PROGRESS NOTES
04/14/18 1311   Behavioral Health   Hallucinations denies / not responding to hallucinations   Thinking poor concentration   Orientation time: oriented;date: oriented;place: oriented;person: oriented   Memory baseline memory   Insight admits / accepts;insight appropriate to situation;insight appropriate to events   Judgement impaired   Eye Contact at examiner   Affect blunted, flat   Mood mood is calm   Physical Appearance/Attire neat   Hygiene well groomed   Suicidality other (see comments)  (Denies )   Self Injury other (see comment)  (Denies )   Activity other (see comment)  (visible, tv, check in, meals )   Speech clear;coherent   Medication Sensitivity no observed side effects;no stated side effects   Psychomotor / Gait steady;balanced     Pt. Explained SI attempt was an accident. Pt. Currently denies auditory. Pt. Denies urges to use. Pt. Shared feels out of it, possibly withdrawing, head foggy, and the light bothering her. Pt. Looks around and out of corner of eyes at times. Pt. Explained sober for an extended period of time and regrets relapsing. Pt. Explained wasn't following recommendations and plans to have  go to  with her after discharge.

## 2018-04-15 PROCEDURE — 97150 GROUP THERAPEUTIC PROCEDURES: CPT | Mod: GO

## 2018-04-15 PROCEDURE — 25000132 ZZH RX MED GY IP 250 OP 250 PS 637: Performed by: PHYSICIAN ASSISTANT

## 2018-04-15 PROCEDURE — 12400007 ZZH R&B MH INTERMEDIATE UMMC

## 2018-04-15 RX ADMIN — FLUOCINONIDE: 0.5 OINTMENT TOPICAL at 11:42

## 2018-04-15 RX ADMIN — MOMETASONE FUROATE: 1 CREAM TOPICAL at 11:41

## 2018-04-15 ASSESSMENT — ACTIVITIES OF DAILY LIVING (ADL)
ORAL_HYGIENE: INDEPENDENT
DRESS: INDEPENDENT;SCRUBS (BEHAVIORAL HEALTH)
GROOMING: INDEPENDENT
LAUNDRY: WITH SUPERVISION

## 2018-04-15 NOTE — PLAN OF CARE
Problem: Depressive Symptoms  Goal: Depressive Symptoms  Signs and symptoms of listed problems will be absent or manageable.   Outcome: Improving  Patient is scoring low on her MSSA protocol, denies any withdrawal symptoms. States that she ran out of Xanax and ended up taking an overdose of coricidin D. States that she was not suicidal. Patient states that she had also been arguing with her . Patient was started on Effexor and has decided that she does not want to take this medication. Patient denies any suicidal ideation, and rates her depression low. Says that she does not want to go to inpatient CD tx, but would go to an outpatient program. States that she had CD tx 2 years and has been off Meth for these 2 years. Patient has been involved in all aspects of unit programming and has been social and interactive with peers. She has been in contact with her family and getting constant updates on her 8 month old son.States her sleep has been good here.

## 2018-04-15 NOTE — PLAN OF CARE
"Problem: Mood Impairment (Depressive Signs/Symptoms) (Adult)  Goal: Improved Mood Symptoms (Depressive Signs/Symptoms)  Outcome: Improving  Rima active on unit throughout evening-participated appropriately in games and activities with peers-pleasant and more spontaneous in 1:1-stated, \"My head is beginning to feel normal.\"-freq smiling this evening-states she enjoyed evening activites-declined Effexor again stating she does not believe she would benefit from medication-denies SI         "

## 2018-04-15 NOTE — PROGRESS NOTES
Occupational Therapy Initial Note:     Pt attended 1/1 occupational therapy groups today.  Pt was cooperative and social with peers and staff.  Pt talked about her 8 month old baby and how she is anxious to get back to him.  Pt reports she was not suicidal when she took the overdose, she was only trying to manage her anxiety.  Pt reports she has been experiencing racing thoughts, memory problems and having difficulty concentrating.  Pt reports nightmare, withdrawing from people and difficulty with her relationship. Pt is able to identify positives about herself as well as a support system outside the hospital.  Pt reports she needs to start working in accepting the death of her sister and realizing how her behaviors are affecting herself and others in her life.  Pt reports she is open to new ideas.  Pt will be encouraged to attend groups for further assessment and to address goals identified on plan of care.

## 2018-04-16 ENCOUNTER — TELEPHONE (OUTPATIENT)
Dept: DERMATOLOGY | Facility: CLINIC | Age: 23
End: 2018-04-16

## 2018-04-16 VITALS
TEMPERATURE: 97 F | RESPIRATION RATE: 16 BRPM | HEIGHT: 64 IN | BODY MASS INDEX: 18.27 KG/M2 | HEART RATE: 62 BPM | SYSTOLIC BLOOD PRESSURE: 103 MMHG | OXYGEN SATURATION: 98 % | DIASTOLIC BLOOD PRESSURE: 60 MMHG | WEIGHT: 107 LBS

## 2018-04-16 PROCEDURE — 99238 HOSP IP/OBS DSCHRG MGMT 30/<: CPT | Performed by: PSYCHIATRY & NEUROLOGY

## 2018-04-16 RX ORDER — MOMETASONE FUROATE 1 MG/G
CREAM TOPICAL DAILY
Qty: 45 G | Refills: 1 | Status: SHIPPED | OUTPATIENT
Start: 2018-04-16 | End: 2018-06-21

## 2018-04-16 RX ORDER — FLUOCINONIDE 0.5 MG/G
OINTMENT TOPICAL 2 TIMES DAILY
Qty: 30 G | Refills: 1 | Status: SHIPPED | OUTPATIENT
Start: 2018-04-16 | End: 2018-06-21

## 2018-04-16 NOTE — PROGRESS NOTES
I met with pt.   We called and scheduled an outpatient substance use evaluation.  We attempted to make a derm appointment per the instruction from internal medicine. This proved to be difficult. The derm clinic is still exploring this and will call me.  Pt is not on meds so no psychiatry appointment was scheduled.  Pt was resistant to scheduling a primary care appointment.  Pt did not want to explore the [facility] Program at [facility] but I did give her the Warmline number.  Pt's  will pick her up. I suggested that 2PM would work.  I completed the written child protection form. I faxed this to [facility] @ fax: [phone]. I emailed this report to [email or web address].           Behavioral Discharge Planning and Instructions      Summary:  You were admitted on 4/12/2018  due to concerns that you had overdosed on xanax and cough syrup.  You were treated by Dr. Avila Serrano MD. You were assessed for the need for phenobarbitrol taper but did not need this. You were given a dose of Effexor but then said you did not need medications.  You did want to do outpatient substance use treatment.  You were treated and assessed for psoriasis.  A Mother -Baby program was explored with you but you stated you will get counseling at the substance use program. You were discharged on 4/[facility] as transported by your .      Principal Diagnosis:     1.  Major depressive disorder, recurrent, severe.   2.  Polysubstance use disorder.   3.  Rule out posttraumatic stress disorder.   4.  Likely borderline personality disorder.        Health Care Follow-up Appointments:   You have Blue Cross insurance through your father.  You also have medical assistance through [facility].Use this service for transportation to your health care appointments.  [facility] - - [phone] or the nurse line if need special accommodations [phone]      You are not taking any medications so no  psychiatric appointments were set for you.      Attend your substance use evaluton on Monday, April 23, 2018.  [facility]  [address] [address]  Ph: [phone]    You were referred to dermatology but attempts to schedule this appointment were not successful. See below for a primary care appointment where you can talk about a referral to dermatology.  Follow Up ([facility])       Follow up with dermatology, at (location with clinic name or city) [facility] dermatology clinic, within 2 weeks of discharge to establish care and for further treatment of psoriasis. For appointments call Appointments: [phone] and ask to see a resident of the derm clinic in an acute care slot.     Appointments on [place] and/or [facility] (with [facility] or [facility] provider or service). Call [phone] if you haven't heard regarding these appointments within 7 days of discharge.                Attend this new patient visit to establish care, for a post hospital visit and help you manage the dermatology referral.  Apr 30, 2018  3:00 PM CDT   Office Visit with Hali Cuellar MD   [facility] ([facility]    [address] [place] [address] [phone]              Attend all scheduled appointments with your outpatient providers. Call at least 24 hours in advance if you need to reschedule an appointment to ensure continued access to your outpatient providers.   Major Treatments, Procedures and Findings:  You were provided with: a psychiatric assessment, assessed for medical stability, medication evaluation and/or management and group therapy      Your pregnancy test was negative.  HCG Qualitative Serum Negative  NEG^Negative  Final 04/11/2018  6:05 PM [facility]   Comment:       This is your drug screen results.    Benzodiazepine Qual Urine Positive (A) NEG^Negative  Final 04/11/2018  8:45 PM [facility]         Symptoms to Report: mood getting worse or thoughts of  suicide    Early warning signs can include: increased thoughts or behaviors of suicide or self-harm     Safety and Wellness:  Take all medicines as directed.  Make no changes unless your doctor suggests them.      Follow treatment recommendations.  Refrain from alcohol and non-prescribed drugs.  If there is a concern for safety, call 911.    Resources:   [facility] Crisis (COPE) Response - Adult [phone]        The Mother - Baby HopeLine - 887.260.HOPE (6640)  A free, telephone triage and resource line for families experiencing stress or challenges related to mental health symptoms during and after pregnancy, or parenting young childrens.       [place] Recovery Connection ([facility])  [facility] connects people seeking recovery to resources that help foster and sustain long-term recovery.  Whether you are seeking resources for treatment, transportation, housing, job training, education, health care or other pathways to recovery, [facility] is a great place to start.  [phone]. [email or web address]      The treatment team has appreciated the opportunity to work with you.     If you have any questions or concerns our unit number is [phone].  You may be receiving a follow-up phone call within the next three days from a representative from behavioral health.    You have identified the best phone number to reach you as [phone]

## 2018-04-16 NOTE — TELEPHONE ENCOUNTER
We received a call from the call center stating that a patient was seen at the hospital and now needs a dermatology follow up for psoriasis. I spoke bhupendra Chávez from social work and she explained that the patient was being discharged today and was told to follow up with Dermatology. I looked into the patients chart and she was seen by a Joie BRAUN who placed the referral for derm, note also states that dermatology was consulted with.     Should we get Rima added on to a Hospital follow up appointment or just next available?     Saira can be reached at [phone]

## 2018-04-16 NOTE — PLAN OF CARE
Problem: Depressive Symptoms  Goal: Depressive Symptoms  Signs and symptoms of listed problems will be absent or manageable.   Outcome: Adequate for Discharge Date Met: 04/16/18  Patient has discussed discharge with her M.D.

## 2018-04-16 NOTE — PLAN OF CARE
Problem: Depressive Symptoms  Goal: Depressive Symptoms  Signs and symptoms of listed problems will be absent or manageable.   Outcome: Adequate for Discharge Date Met: 04/16/18  Patient discharging today. Will have in formation to follow-up out outpatient CD tx. Will also see primary medicine for follow-up referral for dermatology care. Patient displays a full range of affect and denies any suicidal ideation. picked up patient.

## 2018-04-16 NOTE — TELEPHONE ENCOUNTER
Rima is really only available to Monday so she is going to go to a dermatology clinic that has more Monday availability

## 2018-04-16 NOTE — PROGRESS NOTES
Patient was calm this evening, social with others, visible in the milieu, attended and participated in groups and was visible for mealtimes. Patient played The Zebra, watched the Petsy game, and did oragami with peers. Patient denies SI and SIB as well as hallucinations. Patient denies all mental health issues and reports feeling good. ADL's are independent with no nutrition concerns. No visitors this evening.        04/15/18 5449   Behavioral Health   Hallucinations denies / not responding to hallucinations   Thinking intact   Orientation time: oriented;date: oriented;place: oriented;person: oriented   Memory baseline memory   Insight admits / accepts   Judgement intact   Eye Contact at examiner   Affect full range affect   Mood mood is calm   Physical Appearance/Attire neat;attire appropriate to age and situation   Hygiene well groomed   Suicidality other (see comments)  (Patient denies)   Self Injury other (see comment)  (Patient denies)   Elopement (Patient does not appear to be a risk)   Activity other (see comment)  (Visible in milieu, social, groups, mealtimes)   Speech clear;coherent   Medication Sensitivity no observed side effects;no stated side effects   Psychomotor / Gait balanced;steady

## 2018-04-16 NOTE — DISCHARGE INSTRUCTIONS
Behavioral Discharge Planning and Instructions      Summary:  You were admitted on 4/12/2018  due to concerns that you had overdosed on xanax and cough syrup.  You were treated by Dr. Avila Serrano MD. You were assessed for the need for phenobarbitrol taper but did not need this. You were given a dose of Effexor but then said you did not need medications.  You did want to do outpatient substance use treatment.  You were treated and assessed for psoriasis.  A Mother -Baby program was explored with you but you stated you will get counseling at the substance use program. You were discharged on 4/16/18 [facility] as transported by your .      Principal Diagnosis:     1.  Major depressive disorder, recurrent, severe.   2.  Polysubstance use disorder.   3.  Rule out posttraumatic stress disorder.   4.  Likely borderline personality disorder.        Health Care Follow-up Appointments:   You have Blue Cross insurance through your father.  You also have medical assistance through [facility].Use this service for transportation to your health care appointments.  [place] - - [phone] or the nurse line if need special accommodations [phone]      You are not taking any medications so no psychiatric appointments were set for you.      Attend your substance use evaluton on Monday, April 23, 2018.  [facility]  [address] [address]  Ph: [phone]    You were referred to dermatology but attempts to schedule this appointment were not successful. See below for a primary care appointment where you can talk about a referral to dermatology.  Follow Up ([facility])       Follow up with dermatology, at (location with clinic name or city) [facility] dermatology clinic, within 2 weeks of discharge to establish care and for further treatment of psoriasis. For appointments call Appointments: [phone] and ask to see a resident of the derm clinic in an acute care slot.     Appointments on  [place] and/or [facility] (with [facility] or [facility] provider or service). Call [phone] if you haven't heard regarding these appointments within 7 days of discharge.                Attend this new patient visit to establish care, for a post hospital visit and help you manage the dermatology referral.  Apr 30, 2018  3:00 PM CDT   Office Visit with Hali Cuellar MD   [facility] ([facility])    [address] [place] [address] [phone]              Attend all scheduled appointments with your outpatient providers. Call at least 24 hours in advance if you need to reschedule an appointment to ensure continued access to your outpatient providers.   Major Treatments, Procedures and Findings:  You were provided with: a psychiatric assessment, assessed for medical stability, medication evaluation and/or management and group therapy      Your pregnancy test was negative.  HCG Qualitative Serum Negative  NEG^Negative  Final 04/11/2018  6:05 PM [facility]   Comment:       This is your drug screen results.    Benzodiazepine Qual Urine Positive (A) NEG^Negative  Final 04/11/2018  8:45 PM [facility]         Symptoms to Report: mood getting worse or thoughts of suicide    Early warning signs can include: increased thoughts or behaviors of suicide or self-harm     Safety and Wellness:  Take all medicines as directed.  Make no changes unless your doctor suggests them.      Follow treatment recommendations.  Refrain from alcohol and non-prescribed drugs.  If there is a concern for safety, call 911.    Resources:   [facility] Crisis (COPE) Response - Adult [phone]        The Mother - Baby HopeLine - 425.387.HOPE (2754)  A free, telephone triage and resource line for families experiencing stress or challenges related to mental health symptoms during and after pregnancy, or parenting young childrens.       [place] Recovery [facility] ([place])  [place] connects  people seeking recovery to resources that help foster and sustain long-term recovery.  Whether you are seeking resources for treatment, transportation, housing, job training, education, health care or other pathways to recovery, [facility] is a great place to start.  [phone]. [email or web address]      The treatment team has appreciated the opportunity to work with you.     If you have any questions or concerns our unit number is [phone].  You may be receiving a follow-up phone call within the next three days from a representative from behavioral health.    You have identified the best phone number to reach you as [phone]

## 2018-04-17 NOTE — DISCHARGE SUMMARY
"Admit Date:     04/12/2018   Discharge Date:     04/16/2018      CHIEF COMPLAINT AND REASON FOR ADMISSION:  The patient is a 23-year-old  female, 8 months postpartum, admitted with altered mental status from benzodiazepines and Coricidin overdose.  The patient initially presented to [facility], hospitalized there on the medical floor after overdose.  She reported a longstanding history of depression and anxiety.  She said that she had been buying Xanax on the street for the last few days and using up to 6 mg per day.  She ran out of Xanax and said that she drank Coricidin not for suicide but just an attempt to numb her emotional pain.  She reported that she had not been taking prescribed psychotropic medication recently.  She stated that she did not have a therapist but would like to see one.  Reported poor sleep, fluctuating appetite, experiencing paranoia at times, stated \"I just don't trust people because of all my abuse.\"  The patient was seen at [facility] on the medical floor by Dr. Dennis Aldrich, was recommended to come to the psychiatric hospital for admission.  As patient did not want to come to the hospital voluntarily, she was put on 72-hour hold.      DISCHARGE DIAGNOSES:   Major depressive disorder, recurrent, severe.     Polysubstance use disorder.   Likely posttraumatic stress disorder.   Likely borderline personality disorder.        CONSULTS:  The patient was seen by Internal Medicine due to widespread psoriasis, was recommended to continue Lidex 0.05% cream and mometasone cream to face, was recommended to see a dermatologist on an outpatient basis.  I appreciate Internal Medicine's help with this patient.        LABORATORY WORK:  Urine drug screen was positive for PCP and benzodiazepines.  Glucose was 74.  CT of the cervical spine without contrast showed no significant spinal canal or neural foraminal narrowing and no evidence of fracture.  CT of the head without " "contrast was normal.      HISTORY OF COURSE:  After the patient came to this facility, she was put on 15-minute checks to ensure her safety.  I met with her and offered her to sign in voluntarily; however, she insisted on being discharged.  Her explanation for discharge was that nothing would have happened in 3 days of hospitalization and therefore, she could be very well discharged today.  I tried to explain to her that this was not the case.  I offered to start her on a new antidepressant Effexor, watch her for benzodiazepine withdrawal, refer her to outpatient chemical dependency treatment program, and connect her with an outpatient therapist.  The patient still insisted on being discharged.  Therefore she was continued on 72-hour hold throughout this whole hospital stay.  Clinical treatment coordinator has contacted Child Protective Services due to suspicion that patient was abusing Xanax and Coricidin in front of her children.  The patient was seen by myself on 2 occasions only.  She was admitted on Friday.  She was started on Effexor, however, she recently had refused to take it.  She stayed at this hospital over the weekend and reportedly was social with peers and interactive.  She was in contact with her family and was getting constant updates on her 8-month-old son.  On the day of discharge, the patient met with myself, said that she felt much more stable, denied the presence of suicidal thoughts.  She again refused to take Effexor, stated \"I just want to do things naturally.\"  She was not receptive to my explanation that because of her history of chronic depression she most likely needs to take antidepressants.        DISCHARGE APPOINTMENTS:  The patient will have substance use evaluation on 04/23 at [facility] and was recommended to schedule an appointment with dermatologist at [facility], [facility], and will schedule appointment with a primary care " doctor, Dr. Hali Cuellar at [facility] in [place] on 2018 at 3:00 p.m.      DISCHARGE MEDICATIONS:   1.  Lidex 0.05% ointment apply topically 2 times a day for psoriasis.    2.  Mometasone 0.1% cream apply topically daily.         KAYLAH HERNANDEZ MD             D: 2018   T: 2018   MT: JASBIR      Name:     ANUJ DIAMOND   MRN:      [id number]        Account:        [id number]   :      1995           Admit Date:     2018                                  Discharge Date: 2018      Document: [id number]

## 2018-04-30 ENCOUNTER — OFFICE VISIT (OUTPATIENT)
Dept: FAMILY MEDICINE | Facility: CLINIC | Age: 23
End: 2018-04-30
Payer: COMMERCIAL

## 2018-04-30 VITALS
SYSTOLIC BLOOD PRESSURE: 88 MMHG | DIASTOLIC BLOOD PRESSURE: 52 MMHG | OXYGEN SATURATION: 98 % | HEART RATE: 72 BPM | WEIGHT: 106 LBS | TEMPERATURE: 98.8 F | BODY MASS INDEX: 18.19 KG/M2

## 2018-04-30 DIAGNOSIS — F39 MOOD DISORDER (H): ICD-10-CM

## 2018-04-30 DIAGNOSIS — Z30.013 ENCOUNTER FOR INITIAL PRESCRIPTION OF INJECTABLE CONTRACEPTIVE: Primary | ICD-10-CM

## 2018-04-30 DIAGNOSIS — F19.20 POLYSUBSTANCE DEPENDENCE (H): ICD-10-CM

## 2018-04-30 LAB — BETA HCG QUAL IFA URINE: NEGATIVE

## 2018-04-30 PROCEDURE — 99213 OFFICE O/P EST LOW 20 MIN: CPT | Performed by: INTERNAL MEDICINE

## 2018-04-30 PROCEDURE — 84703 CHORIONIC GONADOTROPIN ASSAY: CPT | Performed by: INTERNAL MEDICINE

## 2018-04-30 RX ORDER — MEDROXYPROGESTERONE ACETATE 150 MG/ML
150 INJECTION, SUSPENSION INTRAMUSCULAR
Qty: 3 ML | Refills: 3 | OUTPATIENT
Start: 2018-04-30 | End: 2019-06-17

## 2018-04-30 NOTE — MR AVS SNAPSHOT
After Visit Summary   4/30/2018    Rima Diaz    MRN: [id number]           Patient Information     Date Of Birth          1995        Visit Information        Provider Department      4/30/2018 3:00 PM Hali Cuellar MD [facility]        Today's Diagnoses     Encounter for initial prescription of injectable contraceptive    -  1    Mood disorder (H)        Polysubstance dependence (H)           Follow-ups after your visit        Follow-up notes from your care team     Return for mental health and OB-Gyn.      Your next 10 appointments already scheduled     May 07, 2018  2:40 PM CDT   Office Visit with Tabatha Castellanos MD   [facility] ([facility])    [address] [place] [address] [phone]           Bring a current list of meds and any records pertaining to this visit. For Physicals, please bring immunization records and any forms needing to be filled out. Please arrive 10 minutes early to complete paperwork.              Who to contact     If you have questions or need follow up information about today's clinic visit or your schedule please contact [facility] directly at [phone].  Normal or non-critical lab and imaging results will be communicated to you by MyChart, letter or phone within 4 business days after the clinic has received the results. If you do not hear from us within 7 days, please contact the clinic through [employer] or phone. If you have a critical or abnormal lab result, we will notify you by phone as soon as possible.  Submit refill requests through [email or web address] or call your pharmacy and they will forward the refill request to us. Please allow 3 business days for your refill to be completed.          Additional Information About Your Visit        [employer] Information     [email or web address] lets you send messages to your doctor, view your test results, renew your  "prescriptions, schedule appointments and more. To sign up, go to [email or web address] . Click on \"Log in\" on the left side of the screen, which will take you to the Welcome page. Then click on \"Sign up Now\" on the right side of the page.     You will be asked to enter the access code listed below, as well as some personal information. Please follow the directions to create your username and password.     Your access code is: [email or web address]  Expires: 2018 11:46 AM     Your access code will  in 90 days. If you need help or a new code, please call your [facility] clinic or [phone].        Care EveryWhere ID     This is your Care EveryWhere ID. This could be used by other organizations to access your [facility] medical records  [id number]        Your Vitals Were     Pulse Temperature Pulse Oximetry Breastfeeding? BMI (Body Mass Index)       72 98.8  F (37.1  C) (Tympanic) 98% No 18.19 kg/m2        Blood Pressure from Last 3 Encounters:   18 (!) 88/52   04/15/18 103/60   18 106/62    Weight from Last 3 Encounters:   18 106 lb (48.1 kg)   04/15/18 107 lb (48.5 kg)   18 103 lb 6.3 oz (46.9 kg)              We Performed the Following     Beta HCG Qual, Urine - [facility] and [place] ([id number])          Today's Medication Changes          These changes are accurate as of 18 11:59 PM.  If you have any questions, ask your nurse or doctor.               Start taking these medicines.        Dose/Directions    medroxyPROGESTERone 150 MG/ML injection   Commonly known as:  DEPO-PROVERA   Used for:  Encounter for initial prescription of injectable contraceptive   Started by:  Hali Cuellar MD        Dose:  150 mg   Inject 1 mL (150 mg) into the muscle every 3 months   Quantity:  3 mL   Refills:  3            Where to get your medicines      Some of these will need a paper prescription and others can be bought over the counter.  Ask your nurse if you have questions.     You don't need a " prescription for these medications     medroxyPROGESTERone 150 MG/ML injection                Primary Care Provider Fax #    Physician No Ref-Primary [phone]       No address on file        Equal Access to Services     RICKY ALVARENGA : Hadii aad ku hadbradleyvijay Marthaleena, billy inodarlin, macho gooden, meghan lacy laFlorencioracquel lin. So [facility] [phone].    ATENCIÓN: Si habla español, tiene a mckeon disposición servicios gratuitos de asistencia lingüística. Llame al [phone].    We comply with applicable federal civil rights laws and [place] laws. We do not discriminate on the basis of race, color, national origin, age, disability, sex, sexual orientation, or gender identity.            Thank you!     Thank you for choosing [facility]  for your care. Our goal is always to provide you with excellent care. Hearing back from our patients is one way we can continue to improve our services. Please take a few minutes to complete the written survey that you may receive in the mail after your visit with us. Thank you!             Your Updated Medication List - Protect others around you: Learn how to safely use, store and throw away your medicines at www.disposemymeds.org.          This list is accurate as of 4/30/18 11:59 PM.  Always use your most recent med list.                   Brand Name Dispense Instructions for use Diagnosis    fluocinonide 0.05 % ointment    LIDEX    30 g    Apply topically 2 times daily    Psoriasis       medroxyPROGESTERone 150 MG/ML injection    DEPO-PROVERA    3 mL    Inject 1 mL (150 mg) into the muscle every 3 months    Encounter for initial prescription of injectable contraceptive       mometasone 0.1 % cream    ELOCON    45 g    Apply topically daily    Psoriasis

## 2018-04-30 NOTE — PROGRESS NOTES
SUBJECTIVE:   Rima Diaz is a 23 year old female who presents to clinic today for the following health issues:      ED/UC Followup:    Facility:  [facility]  Date of visit: 4/11/18  Reason for visit: Drug overdose   Current Status: andria Abarca is here for hospitalization follow-up.  She is accompanied by her .  She was hospitalized 4/12 through 4/16 for altered mental status and medication overdose (alprazolam and Coricidin).  She was kept on a 72 hour hold.  She reports that she had a rule 25 assessment last week and an outpatient addiction center and will be started with an outpatient treatment program and will have a psychiatrist there as well.    Her main question today is actually about birth control.  Would like to get a pregnancy test today.  She has thought about the Depo shot, also has questions about other birth control methods.    Lives with  and 9 month.  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          Reviewed and updated as needed this visit by clinical staff  Tobacco  Allergies  Meds  Soc Hx      Reviewed and updated as needed this visit by Provider         ROS:  Psych,  reviewed,  otherwise negative unless noted above.       OBJECTIVE:     BP (!) 88/52 (BP Location: Right arm, Patient Position: Chair, Cuff Size: Adult Regular)  Pulse 72  Temp 98.8  F (37.1  C) (Tympanic)  Wt 106 lb (48.1 kg)  SpO2 98%  Breastfeeding? No  BMI 18.19 kg/m2  Body mass index is 18.19 kg/(m^2).  GENERAL: healthy, alert and no distress  PSYCH: somewhat flat affect, mentation seems a little lower than average     Diagnostic Test Results:  Results for orders placed or performed in visit on 04/30/18   Beta HCG Qual, Urine - [facility] [place] ([id number])   Result Value Ref Range    Beta HCG Qual IFA Urine Negative NEG^Negative          ASSESSMENT/PLAN:       1. Encounter for initial prescription of injectable contraceptive  We discussed multiple options including OCP, patches, nuvaring,  depo, nexplanon, and copper IUD.  She would like to go with depo.  Given today.  Declined STD testing.   - Beta HCG Qual, Urine - [facility] [place] ([id number])  - medroxyPROGESTERone (DEPO-PROVERA) 150 MG/ML injection; Inject 1 mL (150 mg) into the muscle every 3 months  Dispense: 3 mL; Refill: 3    2. Mood disorder (H)  She reports she has follow up with addiction treatment center and will be seeing a psychiatrist there     3. Polysubstance dependence (H)      Follow up - with addiction treatment   3 months for next depo keyshawn    Hali Cuellar MD  [facility]

## 2018-04-30 NOTE — NURSING NOTE
"Chief Complaint   Patient presents with     Hospital F/U       Initial BP (!) 88/52 (BP Location: Right arm, Patient Position: Chair, Cuff Size: Adult Regular)  Pulse 72  Temp 98.8  F (37.1  C) (Tympanic)  Wt 106 lb (48.1 kg)  SpO2 98%  Breastfeeding? No  BMI 18.19 kg/m2 Estimated body mass index is 18.19 kg/(m^2) as calculated from the following:    Height as of 4/12/18: 5' 4\" (1.626 m).    Weight as of this encounter: 106 lb (48.1 kg).  Medication Reconciliation: complete  "

## 2018-05-01 PROBLEM — Z36.89 ENCOUNTER FOR TRIAGE IN PREGNANT PATIENT: Status: RESOLVED | Noted: 2017-08-02 | Resolved: 2018-05-01

## 2018-05-01 PROBLEM — Z34.90 PREGNANCY: Status: RESOLVED | Noted: 2017-08-02 | Resolved: 2018-05-01

## 2018-05-01 PROBLEM — Z30.42 ON DEPO-PROVERA FOR CONTRACEPTION: Status: ACTIVE | Noted: 2018-05-01

## 2018-05-07 ENCOUNTER — OFFICE VISIT (OUTPATIENT)
Dept: FAMILY MEDICINE | Facility: CLINIC | Age: 23
End: 2018-05-07
Payer: COMMERCIAL

## 2018-05-07 VITALS — SYSTOLIC BLOOD PRESSURE: 100 MMHG | DIASTOLIC BLOOD PRESSURE: 66 MMHG

## 2018-05-07 DIAGNOSIS — L40.0 PLAQUE PSORIASIS: Primary | ICD-10-CM

## 2018-05-07 DIAGNOSIS — Z51.81 ENCOUNTER FOR MONITORING OF METHOTREXATE THERAPY: ICD-10-CM

## 2018-05-07 DIAGNOSIS — L40.9 SCALP PSORIASIS: ICD-10-CM

## 2018-05-07 DIAGNOSIS — Z79.631 ENCOUNTER FOR MONITORING OF METHOTREXATE THERAPY: ICD-10-CM

## 2018-05-07 DIAGNOSIS — L40.8 INVERSE PSORIASIS: ICD-10-CM

## 2018-05-07 PROCEDURE — 87389 HIV-1 AG W/HIV-1&-2 AB AG IA: CPT | Performed by: FAMILY MEDICINE

## 2018-05-07 PROCEDURE — 86803 HEPATITIS C AB TEST: CPT | Performed by: FAMILY MEDICINE

## 2018-05-07 PROCEDURE — 87340 HEPATITIS B SURFACE AG IA: CPT | Performed by: FAMILY MEDICINE

## 2018-05-07 PROCEDURE — 86480 TB TEST CELL IMMUN MEASURE: CPT | Performed by: FAMILY MEDICINE

## 2018-05-07 PROCEDURE — 36415 COLL VENOUS BLD VENIPUNCTURE: CPT | Performed by: FAMILY MEDICINE

## 2018-05-07 PROCEDURE — 86706 HEP B SURFACE ANTIBODY: CPT | Performed by: FAMILY MEDICINE

## 2018-05-07 PROCEDURE — 99214 OFFICE O/P EST MOD 30 MIN: CPT | Performed by: FAMILY MEDICINE

## 2018-05-07 RX ORDER — FOLIC ACID 1 MG/1
1 TABLET ORAL DAILY
Qty: 100 TABLET | Refills: 3 | Status: SHIPPED | OUTPATIENT
Start: 2018-05-07 | End: 2018-10-30 | Stop reason: ALTCHOICE

## 2018-05-07 NOTE — MR AVS SNAPSHOT
After Visit Summary   5/7/2018    Rima Diaz    MRN: [id number]           Patient Information     Date Of Birth          1995        Visit Information        Provider Department      5/7/2018 2:40 PM Tabatha Castellanos MD [facility]        Today's Diagnoses     Plaque psoriasis    -  1    Scalp psoriasis        Inverse psoriasis        Encounter for monitoring of methotrexate therapy          Care Instructions    FUTURE APPOINTMENTS  Please get labs done today.  Follow up in 1 week(s).    METHOTREXATE (MTX) INSTRUCTIONS  Take by mouth 12.5 mg = five 2.5 mg tablets once per week.    Continue to take by mouth folic acid 1000 mcg every day of the week, except the day that you are taking the methotrexate.    METHOTREXATE (MTX) INFORMATION  Supplements (to reduce side effects):    Take by mouth OTC folic acid 1000 mcg every day of the week, except for the day you are taking the methotrexate.    Side effects:    MOST patients do not experience side effects, and if present, these minor side effects improve over time as the body adjusts.    Increased sensitivity of the skin to the sunlight. Therefore, limit sun exposure and use at least a 50-75 SPF and reapply every 2 hours.    Nausea or vomiting    Abnormalities of liver function tests - liver function blood tests (LFT) will be ordered to watch your liver. These side effects are more likely to occur at higher doses.    About 1-3% of patients develop mouth sores, rashes, diarrhea or abnormalities of their blood counts.    Methotrexate may cause scarring (cirrhosis) of the liver, but this side effects is rare and is most likely to occur in patients who already have liver problems or are already taking drugs that are toxic to the liver.    Lung problems (persistent cough or unexplained shortness of breath) can occur while taking methotrexate. These symptoms are more common in people with poor lung function. Persistent  "cough or shortness of breath should be reported to your doctor.    Slow hair loss is seen in some patients, but the hair grows back after the medication is stopped.          Follow-ups after your visit        Future tests that were ordered for you today     Open Standing Orders        Priority Remaining Interval Expires Ordered    CBC with platelets differential Routine  Weekly 2018    Comprehensive metabolic panel Routine  Weekly 2018            Who to contact     If you have questions or need follow up information about today's clinic visit or your schedule please contact [facility] [place] directly at [phone].  Normal or non-critical lab and imaging results will be communicated to you by MyChart, letter or phone within 4 business days after the clinic has received the results. If you do not hear from us within 7 days, please contact the clinic through [employer] or phone. If you have a critical or abnormal lab result, we will notify you by phone as soon as possible.  Submit refill requests through [employer] or call your pharmacy and they will forward the refill request to us. Please allow 3 business days for your refill to be completed.          Additional Information About Your Visit        [employer] Information     [employer] lets you send messages to your doctor, view your test results, renew your prescriptions, schedule appointments and more. To sign up, go to [email or web address] . Click on \"Log in\" on the left side of the screen, which will take you to the Welcome page. Then click on \"Sign up Now\" on the right side of the page.     You will be asked to enter the access code listed below, as well as some personal information. Please follow the directions to create your username and password.     Your access code is: [email or web address]  Expires: 2018 11:46 AM     Your access code will  in 90 days. If you need help or a new code, please call your [facility] or " [phone].        Care EveryWhere ID     This is your Care EveryWhere ID. This could be used by other organizations to access your [facility] medical records  [id number]        Your Vitals Were     Breastfeeding?                   No            Blood Pressure from Last 3 Encounters:   05/07/18 100/66   04/30/18 (!) 88/52   04/15/18 103/60    Weight from Last 3 Encounters:   04/30/18 106 lb (48.1 kg)   04/15/18 107 lb (48.5 kg)   04/12/18 103 lb 6.3 oz (46.9 kg)              We Performed the Following     Hepatitis B Surface Antibody     Hepatitis B surface antigen     Hepatitis C antibody     HIV Antigen Antibody Combo     M Tuberculosis by Quantiferon          Today's Medication Changes          These changes are accurate as of 5/7/18  3:01 PM.  If you have any questions, ask your nurse or doctor.               Start taking these medicines.        Dose/Directions    folic acid 1 MG tablet   Commonly known as:  FOLVITE   Used for:  Plaque psoriasis, Scalp psoriasis, Inverse psoriasis, Encounter for monitoring of methotrexate therapy   Started by:  Tabatha Castellanos MD        Dose:  1 mg   Take 1 tablet (1 mg) by mouth daily   Quantity:  100 tablet   Refills:  3       methotrexate 2.5 MG tablet CHEMO   Used for:  Plaque psoriasis, Scalp psoriasis, Inverse psoriasis, Encounter for monitoring of methotrexate therapy   Started by:  Tabatha Castellanos MD        Dose:  12.5 mg   Take 5 tablets (12.5 mg) by mouth every 7 days   Quantity:  10 tablet   Refills:  1            Where to get your medicines      These medications [facility] [place] [address]     Phone:  [phone]     folic acid 1 MG tablet    methotrexate 2.5 MG tablet CHEMO                Primary Care Provider Fax #    Physician No Ref-Primary [phone]       No address on file        Equal Access to Services     RICKY ALVARENGA AH:  Hadii emiliano maldonadoo Sojedali, waaxda luqadaha, qaybta kaalmada giacomo, meghan jessiein hayaan arlettemirela lacy lamarileenaresh delia. So [facility] [phone].    ATENCIÓN: Si rao adrian, tiene a mckeon disposición servicios gratuitos de asistencia lingüística. Llame al [phone].    We comply with applicable federal civil rights laws and [place] laws. We do not discriminate on the basis of race, color, national origin, age, disability, sex, sexual orientation, or gender identity.            Thank you!     Thank you for choosing [facility] [place]  for your care. Our goal is always to provide you with excellent care. Hearing back from our patients is one way we can continue to improve our services. Please take a few minutes to complete the written survey that you may receive in the mail after your visit with us. Thank you!             Your Updated Medication List - Protect others around you: Learn how to safely use, store and throw away your medicines at www.disposemymeds.org.          This list is accurate as of 5/7/18  3:01 PM.  Always use your most recent med list.                   Brand Name Dispense Instructions for use Diagnosis    fluocinonide 0.05 % ointment    LIDEX    30 g    Apply topically 2 times daily    Psoriasis       folic acid 1 MG tablet    FOLVITE    100 tablet    Take 1 tablet (1 mg) by mouth daily    Plaque psoriasis, Scalp psoriasis, Inverse psoriasis, Encounter for monitoring of methotrexate therapy       medroxyPROGESTERone 150 MG/ML injection    DEPO-PROVERA    3 mL    Inject 1 mL (150 mg) into the muscle every 3 months    Encounter for initial prescription of injectable contraceptive       methotrexate 2.5 MG tablet CHEMO     10 tablet    Take 5 tablets (12.5 mg) by mouth every 7 days    Plaque psoriasis, Scalp psoriasis, Inverse psoriasis, Encounter for monitoring of methotrexate therapy       mometasone 0.1 % cream    ELOCON    45 g    Apply topically daily    Psoriasis

## 2018-05-07 NOTE — LETTER
5/7/2018         RE: Rima Diaz  [address]  [address] [place] [address]        Dear Colleague,    Thank you for referring your patient, Rima Diaz, to [facility]. Please see a copy of my visit note below.    [facility] - PRIMARY CARE SKIN    CC : Psoriasis   SUBJECTIVE:                                                    Rima Diaz is a 23 year old female who presents to clinic today with  because of psoriasis that started years ago. Psoriasis flared during pregnancy in 2017 and has not improved since.She is not currently breastfeeding.    Locations : Scalp, Face, Neck, Trunk, Arms, Legs, Hands, Buttocks and Groin.  Onset : Plaque psoriasis diagnosed years ago approximately age 17.  Pruritic : YES.  Associated symptoms : flaking, itching, redness and scaling.  Symptoms appear to be : worsening.  Aggravating factors : none identified.  Relieving factors : topical steroid.    Therapies tried : Lidex 0.05% ointment, Elocon 0.1% cream most recently prescribed 4/12/18 in ED.  Response to treatment : Patches had slightly improved with use of topical steroids.    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    Patient Active Problem List   Diagnosis     Other and unspecified alcohol dependence, unspecified drinking behavior     Cannabis dependence (H)     Homicidal ideation     Anxiety     Mood disorder (H)     Polysubstance dependence (H)     Disruptive behavior disorder     ADHD (attention deficit hyperactivity disorder)     Major depressive disorder, recurrent episode, moderate (H)     Psoriasis     Suicide attempt by substance overdose, initial encounter (H)     Attempted suicide     On  Depo-Provera for contraception       Past Medical History:   Diagnosis Date     ADHD (attention deficit hyperactivity disorder)      Alcohol abuse      Anxiety      Benzodiazepine abuse, continuous      Depression      Dextromethorphan overdose, intentional self-harm, initial encounter (H) 2018     History of suicidal ideation      Opiate abuse, episodic      Psoriasis     Past Surgical History:   Procedure Laterality Date      SECTION N/A 8/3/2017    Procedure:  SECTION;;  Surgeon: Alivia Arizmendi MD;  Location:  [facility]      Social History   Substance Use Topics     Smoking status: Former Smoker     Types: Other     Quit date: 2016     Smokeless tobacco: Never Used     Alcohol use No      Comment: recovering    Family History     Problem (# of Occurrences) Relation (Name,Age of Onset)    Suicide (1) Other: sister 2014           Prescription Medications as of 2018             fluocinonide (LIDEX) 0.05 % ointment Apply topically 2 times daily    medroxyPROGESTERone (DEPO-PROVERA) 150 MG/ML injection Inject 1 mL (150 mg) into the muscle every 3 months    mometasone (ELOCON) 0.1 % cream Apply topically daily          No Known Allergies     INTEGUMENTARY/SKIN: POSITIVE for pruritis, rash and scaling  MUSCULOSKELETAL: negative for arthralgias  ROS : 14 point review of systems was negative except the symptoms listed above in the HPI.    This document serves as a record of the services and decisions personally performed and made by Roselyn Castellanos MD. It was created on her behalf by Ellis Varghese, a trained medical scribe.  The creation of this document is based on the scribe's personal observations and the provider's statements to the medical scribe.  Ellis Varghese, May 7, 2018 2:37 PM      OBJECTIVE:                                                    GENERAL: healthy, alert and no distress  SKIN: [place] Skin Type - IV.  Scalp, Face, Neck, Trunk, Arms, Legs, Hands, Fingers, Buttocks and Groin  were examined. The dermatoscope was used to help evaluate pigmented lesions.  Skin Pertinent Findings:  Generalized erythematous scaly plaques involving chest, abdomen, arms, legs. TBSA affected : 40%    Scalp : Scaly erythematous plaques on occipital scalp, parietal scalp, and frontal scalp. Scaling in external auditory meatus and postauricular areas.    Nails :     Diagnostic Test Results:  No results found for this or any previous visit (from the past 24 hour(s)).    MDM : . Discussed treatment options including topical steroids, NBUVB phototherapy, methotrexate, biologics. After discussed of risks and benefits and various options, Rima expresses desire to pursue methotrexate first.      ASSESSMENT:                                                      Encounter Diagnoses   Name Primary?     Plaque psoriasis Yes     Scalp psoriasis      Inverse psoriasis      Encounter for monitoring of methotrexate therapy          PLAN:                                                    Patient Instructions   FUTURE APPOINTMENTS  Please get labs done today.  Follow up in 1 week(s).    METHOTREXATE (MTX) INSTRUCTIONS  Take by mouth 12.5 mg = five 2.5 mg tablets once per week.    Continue to take by mouth folic acid 1000 mcg every day of the week, except the day that you are taking the methotrexate.    METHOTREXATE (MTX) INFORMATION  Supplements (to reduce side effects):    Take by mouth OTC folic acid 1000 mcg every day of the week, except for the day you are taking the methotrexate.    Side effects:    MOST patients do not experience side effects, and if present, these minor side effects improve over time as the body adjusts.    Increased sensitivity of the skin to the sunlight. Therefore, limit sun exposure and use at least a 50-75 SPF and reapply every 2 hours.    Nausea or vomiting    Abnormalities of liver function tests - liver function blood tests (LFT) will be ordered to watch your liver. These side effects are more  likely to occur at higher doses.    About 1-3% of patients develop mouth sores, rashes, diarrhea or abnormalities of their blood counts.    Methotrexate may cause scarring (cirrhosis) of the liver, but this side effects is rare and is most likely to occur in patients who already have liver problems or are already taking drugs that are toxic to the liver.    Lung problems (persistent cough or unexplained shortness of breath) can occur while taking methotrexate. These symptoms are more common in people with poor lung function. Persistent cough or shortness of breath should be reported to your doctor.    Slow hair loss is seen in some patients, but the hair grows back after the medication is stopped.        PROCEDURES:                                                    None.    TT : 20 minutes.  CT : 15 minutes. Discussion regarding etiology, spectrum of psoriasis, treatment options, aggravating factors.      The information in this document, created by the medical scribe for me, accurately reflects the services I personally performed and the decisions made by me. I have reviewed and approved this document for accuracy prior to leaving the patient care area.  Roselyn Castellanos MD May 7, 2018 2:37 PM  [facility]          Again, thank you for allowing me to participate in the care of your patient.        Sincerely,        Tabatha Castellanos MD

## 2018-05-07 NOTE — PROGRESS NOTES
[facility] - PRIMARY CARE SKIN    CC : Psoriasis   SUBJECTIVE:                                                    Rima Diaz is a 23 year old female who presents to clinic today with  because of psoriasis that started years ago. Psoriasis flared during pregnancy in 2017 and has not improved since.She is not currently breastfeeding.    Locations : Scalp, Face, Neck, Trunk, Arms, Legs, Hands, Buttocks and Groin.  Onset : Plaque psoriasis diagnosed years ago approximately age 17.  Pruritic : YES.  Associated symptoms : flaking, itching, redness and scaling.  Symptoms appear to be : worsening.  Aggravating factors : none identified.  Relieving factors : topical steroid.    Therapies tried : Lidex 0.05% ointment, Elocon 0.1% cream most recently prescribed 4/12/18 in ED.  Response to treatment : Patches had slightly improved with use of topical steroids.    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    Patient Active Problem List   Diagnosis     Other and unspecified alcohol dependence, unspecified drinking behavior     Cannabis dependence (H)     Homicidal ideation     Anxiety     Mood disorder (H)     Polysubstance dependence (H)     Disruptive behavior disorder     ADHD (attention deficit hyperactivity disorder)     Major depressive disorder, recurrent episode, moderate (H)     Psoriasis     Suicide attempt by substance overdose, initial encounter (H)     Attempted suicide     On Depo-Provera for contraception       Past Medical History:   Diagnosis Date     ADHD (attention deficit hyperactivity disorder)      Alcohol abuse      Anxiety      Benzodiazepine abuse, continuous      Depression      Dextromethorphan overdose, intentional  self-harm, initial encounter (H) 2018     History of suicidal ideation      Opiate abuse, episodic      Psoriasis     Past Surgical History:   Procedure Laterality Date      SECTION N/A 8/3/2017    Procedure:  SECTION;;  Surgeon: Alivia Arizmendi MD;  Location:  L+D      Social History   Substance Use Topics     Smoking status: Former Smoker     Types: Other     Quit date: 2016     Smokeless tobacco: Never Used     Alcohol use No      Comment: recovering    Family History     Problem (# of Occurrences) Relation (Name,Age of Onset)    Suicide (1) Other: sister 2014           Prescription Medications as of 2018             fluocinonide (LIDEX) 0.05 % ointment Apply topically 2 times daily    medroxyPROGESTERone (DEPO-PROVERA) 150 MG/ML injection Inject 1 mL (150 mg) into the muscle every 3 months    mometasone (ELOCON) 0.1 % cream Apply topically daily          No Known Allergies     INTEGUMENTARY/SKIN: POSITIVE for pruritis, rash and scaling  MUSCULOSKELETAL: negative for arthralgias  ROS : 14 point review of systems was negative except the symptoms listed above in the HPI.    This document serves as a record of the services and decisions personally performed and made by Roselyn Castellanos MD. It was created on her behalf by Ellis Varghese, a trained medical scribe.  The creation of this document is based on the scribe's personal observations and the provider's statements to the medical scribe.  Ellis Varghese, May 7, 2018 2:37 PM      OBJECTIVE:                                                    GENERAL: healthy, alert and no distress  SKIN: Cortes Skin Type - IV.  Scalp, Face, Neck, Trunk, Arms, Legs, Hands, Fingers, Buttocks and Groin were examined. The dermatoscope was used to help evaluate pigmented lesions.  Skin Pertinent Findings:  Generalized erythematous scaly plaques involving chest, abdomen, arms, legs. TBSA affected : 40%    Scalp : Scaly erythematous plaques on occipital scalp,  parietal scalp, and frontal scalp. Scaling in external auditory meatus and postauricular areas.    Nails :     Diagnostic Test Results:  No results found for this or any previous visit (from the past 24 hour(s)).    MDM : . Discussed treatment options including topical steroids, NBUVB phototherapy, methotrexate, biologics. After discussed of risks and benefits and various options, Rima expresses desire to pursue methotrexate first.      ASSESSMENT:                                                      Encounter Diagnoses   Name Primary?     Plaque psoriasis Yes     Scalp psoriasis      Inverse psoriasis      Encounter for monitoring of methotrexate therapy          PLAN:                                                    Patient Instructions   FUTURE APPOINTMENTS  Please get labs done today.  Follow up in 1 week(s).    METHOTREXATE (MTX) INSTRUCTIONS  Take by mouth 12.5 mg = five 2.5 mg tablets once per week.    Continue to take by mouth folic acid 1000 mcg every day of the week, except the day that you are taking the methotrexate.    METHOTREXATE (MTX) INFORMATION  Supplements (to reduce side effects):    Take by mouth OTC folic acid 1000 mcg every day of the week, except for the day you are taking the methotrexate.    Side effects:    MOST patients do not experience side effects, and if present, these minor side effects improve over time as the body adjusts.    Increased sensitivity of the skin to the sunlight. Therefore, limit sun exposure and use at least a 50-75 SPF and reapply every 2 hours.    Nausea or vomiting    Abnormalities of liver function tests - liver function blood tests (LFT) will be ordered to watch your liver. These side effects are more likely to occur at higher doses.    About 1-3% of patients develop mouth sores, rashes, diarrhea or abnormalities of their blood counts.    Methotrexate may cause scarring (cirrhosis) of the liver, but this side effects is rare and is most likely to occur in  patients who already have liver problems or are already taking drugs that are toxic to the liver.    Lung problems (persistent cough or unexplained shortness of breath) can occur while taking methotrexate. These symptoms are more common in people with poor lung function. Persistent cough or shortness of breath should be reported to your doctor.    Slow hair loss is seen in some patients, but the hair grows back after the medication is stopped.        PROCEDURES:                                                    None.    TT : 20 minutes.  CT : 15 minutes. Discussion regarding etiology, spectrum of psoriasis, treatment options, aggravating factors.      The information in this document, created by the medical scribe for me, accurately reflects the services I personally performed and the decisions made by me. I have reviewed and approved this document for accuracy prior to leaving the patient care area.  Roselyn Castellanos MD May 7, 2018 2:37 PM  [facility]

## 2018-05-07 NOTE — PATIENT INSTRUCTIONS
FUTURE APPOINTMENTS  Please get labs done today.  Follow up in 1 week(s).    METHOTREXATE (MTX) INSTRUCTIONS  Take by mouth 12.5 mg = five 2.5 mg tablets once per week.    Continue to take by mouth folic acid 1000 mcg every day of the week, except the day that you are taking the methotrexate.    METHOTREXATE (MTX) INFORMATION  Supplements (to reduce side effects):    Take by mouth OTC folic acid 1000 mcg every day of the week, except for the day you are taking the methotrexate.    Side effects:    MOST patients do not experience side effects, and if present, these minor side effects improve over time as the body adjusts.    Increased sensitivity of the skin to the sunlight. Therefore, limit sun exposure and use at least a 50-75 SPF and reapply every 2 hours.    Nausea or vomiting    Abnormalities of liver function tests - liver function blood tests (LFT) will be ordered to watch your liver. These side effects are more likely to occur at higher doses.    About 1-3% of patients develop mouth sores, rashes, diarrhea or abnormalities of their blood counts.    Methotrexate may cause scarring (cirrhosis) of the liver, but this side effects is rare and is most likely to occur in patients who already have liver problems or are already taking drugs that are toxic to the liver.    Lung problems (persistent cough or unexplained shortness of breath) can occur while taking methotrexate. These symptoms are more common in people with poor lung function. Persistent cough or shortness of breath should be reported to your doctor.    Slow hair loss is seen in some patients, but the hair grows back after the medication is stopped.

## 2018-05-08 LAB
HBV SURFACE AB SERPL IA-ACNC: 0.34 M[IU]/ML
HBV SURFACE AG SERPL QL IA: NONREACTIVE
HCV AB SERPL QL IA: NONREACTIVE
HIV 1+2 AB+HIV1 P24 AG SERPL QL IA: NONREACTIVE

## 2018-05-09 LAB
M TB TUBERC IFN-G BLD QL: NEGATIVE
M TB TUBERC IFN-G/MITOGEN IGNF BLD: 0.03 IU/ML

## 2018-06-21 ENCOUNTER — OFFICE VISIT (OUTPATIENT)
Dept: FAMILY MEDICINE | Facility: CLINIC | Age: 23
End: 2018-06-21
Payer: COMMERCIAL

## 2018-06-21 VITALS — DIASTOLIC BLOOD PRESSURE: 61 MMHG | OXYGEN SATURATION: 100 % | SYSTOLIC BLOOD PRESSURE: 94 MMHG | HEART RATE: 68 BPM

## 2018-06-21 DIAGNOSIS — L40.0 PLAQUE PSORIASIS: Primary | ICD-10-CM

## 2018-06-21 DIAGNOSIS — M25.50 ARTHRALGIA, UNSPECIFIED JOINT: ICD-10-CM

## 2018-06-21 PROCEDURE — 99213 OFFICE O/P EST LOW 20 MIN: CPT | Performed by: PHYSICIAN ASSISTANT

## 2018-06-21 RX ORDER — FLUOCINONIDE 0.5 MG/G
CREAM TOPICAL
Qty: 120 G | Refills: 1 | Status: SHIPPED | OUTPATIENT
Start: 2018-06-21 | End: 2018-10-30

## 2018-06-21 NOTE — LETTER
2018         RE: Rima Diaz  [address] [address]        Dear Colleague,    Thank you for referring your patient, Rima Diaz, to the [facility] [place]. Please see a copy of my visit note below.    HPI:  Rima Diaz is a 23 year old year old female patient here today for follow up psoriasis. Was started on MTX 1 month ago. Pt was on for 2 weeks but felt horrible on it so she stopped. She would like a topical and to start Humira.    Duration: years, had guttate psoriasis when she was a teenager  Symptoms:  Dry, flaky thick plaques, painful knees, elbows and wrists     Previous treatments: Mometasone and MTX. Lidex with great improvement but then ran out.      Alleviating/aggravating factors: none    Associated symptoms: none  Additional findings:none  Patient has no other skin complaints today.  Remainder of the HPI, Meds, PMH, Allergies, FH, and SH was reviewed in chart.      Past Medical History:   Diagnosis Date     ADHD (attention deficit hyperactivity disorder)      Alcohol abuse      Anxiety      Benzodiazepine abuse, continuous      Depression      Dextromethorphan overdose, intentional self-harm, initial encounter (H) 2018     History of suicidal ideation      Opiate abuse, episodic      Psoriasis        Past Surgical History:   Procedure Laterality Date      SECTION N/A 8/3/2017    Procedure:  SECTION;;  Surgeon: Alivia Arizmendi MD;  Location:  L+D        Family History   Problem Relation Age of Onset     Suicide Other      sister 2014       Social History     Social History     Marital status: Single     Spouse name: N/A     Number of children: N/A     Years of education: N/A     Occupational History     Not on file.     Social History Main Topics     Smoking status: Former Smoker     Types: Other     Quit date: 2016     Smokeless tobacco: Never Used     Alcohol use No      Comment: recovering     Drug use: No       Comment: everything is former      Sexual activity: Yes     Partners: Male     Birth control/ protection: None     Other Topics Concern     Not on file     Social History Narrative    4/11/2018  and lives with 40 yo  and infant son born August 2017.         Early history:  Rima was removed from her home and was placed in foster care with her brothers and sisters at an early age due to both parents using meth      Educational history:  11th grade at [place], [employer] for the past year. She states she was kicked out of mainstream school for continued etoh intoxication at school. She states she is not currently passing her classes. She has an interest in becoming a .      Marital history:  single      Children:  none      Past living situation:  With bio parents, 3 brothers and 2 sisters and one dog in [place].      Occupational history:  student      Occupational history/current financial support:  none       history:  none                     Outpatient Encounter Prescriptions as of 6/21/2018   Medication Sig Dispense Refill     fluocinonide (LIDEX) 0.05 % cream Apply a thin layer to affected area BID 2-3 weeks. Taper with improvement. Do not use on face or body folds. 120 g 1     medroxyPROGESTERone (DEPO-PROVERA) 150 MG/ML injection Inject 1 mL (150 mg) into the muscle every 3 months 3 mL 3     folic acid (FOLVITE) 1 MG tablet Take 1 tablet (1 mg) by mouth daily (Patient not taking: Reported on 6/21/2018) 100 tablet 3     [DISCONTINUED] fluocinonide (LIDEX) 0.05 % ointment Apply topically 2 times daily (Patient not taking: Reported on 6/21/2018) 30 g 1     [DISCONTINUED] methotrexate 2.5 MG tablet CHEMO Take 5 tablets (12.5 mg) by mouth every 7 days (Patient not taking: Reported on 6/21/2018) 10 tablet 1     [DISCONTINUED] mometasone (ELOCON) 0.1 % cream Apply topically daily 45 g 1     No facility-administered encounter medications on file as of 6/21/2018.         Review Of Systems:  Skin: As above  Eyes: negative  Ears/Nose/Throat: negative  Respiratory: No shortness of breath, dyspnea on exertion, cough, or hemoptysis  Cardiovascular: negative  Gastrointestinal: negative  Genitourinary: negative  Musculoskeletal: negative  Neurologic: negative  Psychiatric: negative  Hematologic/Lymphatic/Immunologic: negative  Endocrine: negative      Objective:     BP 94/61  Pulse 68  SpO2 100%  Breastfeeding? No  Eyes: Conjunctivae/lids: Normal   ENT: Lips:  Normal  MSK: Normal  Cardiovascular: Peripheral edema none  Pulm: Breathing Normal  Neuro/Psych: Orientation: Normal; Mood/Affect: Normal, NAD, WDWN  Following areas examined: face, neck, forearms, legs, thighs  Findings:    1) pink thick WD scaly plaques on legs, thighs, forearms, trunk. 40% involvement.    Assessment and Plan:  1) plaque psoriasis and arthralgias  Disc steroid injections, NBUVB, topicals and Humira  Apply lidex to plaques 2x a day. Tapering with improvement. Do not use on face or body folds.  Discussed side effects of topical steroids including but not limited to atrophy (skin thinning), striae (stretch marks) telangiectasias, steroid acne, and others.   Disc side effects of Humira. Pt is aware of immunosuppressive side effects of Humira.   Will send Humira.   Labs from [facility] to start Humira-Quantiferon, Hep B, C, HIV, CBC, and CMP wnl  Follow up in 2 months  Send humira    Again, thank you for allowing me to participate in the care of your patient.        Sincerely,        Gabriela Varma PA-C

## 2018-06-21 NOTE — PATIENT INSTRUCTIONS
Apply lidex to plaques 2x a day. Tapering with improvement. Do not use on face or body folds.  Discussed side effects of topical steroids including but not limited to atrophy (skin thinning), striae (stretch marks) telangiectasias, steroid acne, and others.     Will send Humira.

## 2018-06-21 NOTE — PROGRESS NOTES
HPI:  Rima Diaz is a 23 year old year old female patient here today for follow up psoriasis. Was started on MTX 1 month ago. Pt was on for 2 weeks but felt horrible on it so she stopped. She would like a topical and to start Humira.    Duration: years, had guttate psoriasis when she was a teenager  Symptoms:  Dry, flaky thick plaques, painful knees, elbows and wrists     Previous treatments: Mometasone and MTX. Lidex with great improvement but then ran out.      Alleviating/aggravating factors: none    Associated symptoms: none  Additional findings:none  Patient has no other skin complaints today.  Remainder of the HPI, Meds, PMH, Allergies, FH, and SH was reviewed in chart.      Past Medical History:   Diagnosis Date     ADHD (attention deficit hyperactivity disorder)      Alcohol abuse      Anxiety      Benzodiazepine abuse, continuous      Depression      Dextromethorphan overdose, intentional self-harm, initial encounter (H) 2018     History of suicidal ideation      Opiate abuse, episodic      Psoriasis        Past Surgical History:   Procedure Laterality Date      SECTION N/A 8/3/2017    Procedure:  SECTION;;  Surgeon: Alivia Arizmendi MD;  Location:  L+D        Family History   Problem Relation Age of Onset     Suicide Other      sister 2014       Social History     Social History     Marital status: Single     Spouse name: N/A     Number of children: N/A     Years of education: N/A     Occupational History     Not on file.     Social History Main Topics     Smoking status: Former Smoker     Types: Other     Quit date: 2016     Smokeless tobacco: Never Used     Alcohol use No      Comment: recovering     Drug use: No      Comment: everything is former      Sexual activity: Yes     Partners: Male     Birth control/ protection: None     Other Topics Concern     Not on file     Social History Narrative    2018  and lives with 38 yo  and infant son born  August 2017.         Early history:  Rima was removed from her home and was placed in foster care with her brothers and sisters at an early age due to both parents using meth      Educational history:  11th grade at [place], [employer] for the past year. She states she was kicked out of mainstream school for continued etoh intoxication at school. She states she is not currently passing her classes. She has an interest in becoming a .      Marital history:  single      Children:  none      Past living situation:  With bio parents, 3 brothers and 2 sisters and one dog in [place].      Occupational history:  student      Occupational history/current financial support:  none       history:  none                     Outpatient Encounter Prescriptions as of 6/21/2018   Medication Sig Dispense Refill     fluocinonide (LIDEX) 0.05 % cream Apply a thin layer to affected area BID 2-3 weeks. Taper with improvement. Do not use on face or body folds. 120 g 1     medroxyPROGESTERone (DEPO-PROVERA) 150 MG/ML injection Inject 1 mL (150 mg) into the muscle every 3 months 3 mL 3     folic acid (FOLVITE) 1 MG tablet Take 1 tablet (1 mg) by mouth daily (Patient not taking: Reported on 6/21/2018) 100 tablet 3     [DISCONTINUED] fluocinonide (LIDEX) 0.05 % ointment Apply topically 2 times daily (Patient not taking: Reported on 6/21/2018) 30 g 1     [DISCONTINUED] methotrexate 2.5 MG tablet CHEMO Take 5 tablets (12.5 mg) by mouth every 7 days (Patient not taking: Reported on 6/21/2018) 10 tablet 1     [DISCONTINUED] mometasone (ELOCON) 0.1 % cream Apply topically daily 45 g 1     No facility-administered encounter medications on file as of 6/21/2018.        Review Of Systems:  Skin: As above  Eyes: negative  Ears/Nose/Throat: negative  Respiratory: No shortness of breath, dyspnea on exertion, cough, or hemoptysis  Cardiovascular: negative  Gastrointestinal: negative  Genitourinary:  negative  Musculoskeletal: negative  Neurologic: negative  Psychiatric: negative  Hematologic/Lymphatic/Immunologic: negative  Endocrine: negative      Objective:     BP 94/61  Pulse 68  SpO2 100%  Breastfeeding? No  Eyes: Conjunctivae/lids: Normal   ENT: Lips:  Normal  MSK: Normal  Cardiovascular: Peripheral edema none  Pulm: Breathing Normal  Neuro/Psych: Orientation: Normal; Mood/Affect: Normal, NAD, WDWN  Following areas examined: face, neck, forearms, legs, thighs  Findings:    1) pink thick WD scaly plaques on legs, thighs, forearms, trunk. 40% involvement.    Assessment and Plan:  1) plaque psoriasis and arthralgias  Disc steroid injections, NBUVB, topicals and Humira  Apply lidex to plaques 2x a day. Tapering with improvement. Do not use on face or body folds.  Discussed side effects of topical steroids including but not limited to atrophy (skin thinning), striae (stretch marks) telangiectasias, steroid acne, and others.   Disc side effects of Humira. Pt is aware of immunosuppressive side effects of Humira.   Will send Humira.   Labs from [facility] to start Humira-Quantiferon, Hep B, C, HIV, CBC, and CMP wnl  Follow up in 2 months  Send humira

## 2018-06-21 NOTE — MR AVS SNAPSHOT
"              After Visit Summary   6/21/2018    Rima Diaz    MRN: [id number]           Patient Information     Date Of Birth          1995        Visit Information        Provider Department      6/21/2018 3:00 PM Gabriela Varma PA-C [facility] [place]        Today's Diagnoses     Plaque psoriasis    -  1      Care Instructions    Apply lidex to plaques 2x a day. Tapering with improvement. Do not use on face or body folds.  Discussed side effects of topical steroids including but not limited to atrophy (skin thinning), striae (stretch marks) telangiectasias, steroid acne, and others.     Will send Humira.           Follow-ups after your visit        Who to contact     If you have questions or need follow up information about today's clinic visit or your schedule please contact [facility] [place] directly at [phone].  Normal or non-critical lab and imaging results will be communicated to you by MyChart, letter or phone within 4 business days after the clinic has received the results. If you do not hear from us within 7 days, please contact the clinic through [employer] or phone. If you have a critical or abnormal lab result, we will notify you by phone as soon as possible.  Submit refill requests through [employer] or call your pharmacy and they will forward the refill request to us. Please allow 3 business days for your refill to be completed.          Additional Information About Your Visit        MyChart Information     [employer] lets you send messages to your doctor, view your test results, renew your prescriptions, schedule appointments and more. To sign up, go to [email or web address] . Click on \"Log in\" on the left side of the screen, which will take you to the Welcome page. Then click on \"Sign up Now\" on the right side of the page.     You will be asked to enter the access code listed below, as well as some personal information. Please follow the directions to " create your username and password.     Your access code is: [email or web address]  Expires: 2018 11:46 AM     Your access code will  in 90 days. If you need help or a new code, please call your [facility] clinic or [phone].        Care EveryWhere ID     This is your Care EveryWhere ID. This could be used by other organizations to access your [facility] medical records  [id number]        Your Vitals Were     Pulse Pulse Oximetry Breastfeeding?             68 100% No          Blood Pressure from Last 3 Encounters:   18 94/61   18 100/66   18 (!) 88/52    Weight from Last 3 Encounters:   18 106 lb (48.1 kg)   04/15/18 107 lb (48.5 kg)   18 103 lb 6.3 oz (46.9 kg)              Today, you had the following     No orders found for display         Today's Medication Changes          These changes are accurate as of 18  3:15 PM.  If you have any questions, ask your nurse or doctor.               Start taking these medicines.        Dose/Directions    fluocinonide 0.05 % cream   Commonly known as:  LIDEX   Used for:  Plaque psoriasis   Started by:  Gabriela Varma PA-C        Apply a thin layer to affected area BID 2-3 weeks. Taper with improvement. Do not use on face or body folds.   Quantity:  120 g   Refills:  1            Where to get your medicines      These medications were sent to [facility] [place] [address]     Phone:  [phone]     fluocinonide 0.05 % cream                Primary Care Provider Fax #    Physician No Ref-Primary [phone]       No address on file        Equal Access to Services     RICKY ALVARENGA : Jono Barnes, wanoam luelbaadaha, qaybta kaalmada maritoyaleticia, meghan lin. So [facility] [phone].    ATENCIÓN: Si habla español, tiene a mckeon disposición servicios gratuitos de asistencia lingüística. Llame al  [phone].    We comply with applicable federal civil rights laws and [place] laws. We do not discriminate on the basis of race, color, national origin, age, disability, sex, sexual orientation, or gender identity.            Thank you!     Thank you for choosing [facility] [place]  for your care. Our goal is always to provide you with excellent care. Hearing back from our patients is one way we can continue to improve our services. Please take a few minutes to complete the written survey that you may receive in the mail after your visit with us. Thank you!             Your Updated Medication List - Protect others around you: Learn how to safely use, store and throw away your medicines at www.disposemymeds.org.          This list is accurate as of 6/21/18  3:15 PM.  Always use your most recent med list.                   Brand Name Dispense Instructions for use Diagnosis    fluocinonide 0.05 % cream    LIDEX    120 g    Apply a thin layer to affected area BID 2-3 weeks. Taper with improvement. Do not use on face or body folds.    Plaque psoriasis       folic acid 1 MG tablet    FOLVITE    100 tablet    Take 1 tablet (1 mg) by mouth daily    Plaque psoriasis, Scalp psoriasis, Inverse psoriasis, Encounter for monitoring of methotrexate therapy       medroxyPROGESTERone 150 MG/ML injection    DEPO-PROVERA    3 mL    Inject 1 mL (150 mg) into the muscle every 3 months    Encounter for initial prescription of injectable contraceptive

## 2018-06-22 ENCOUNTER — TELEPHONE (OUTPATIENT)
Dept: FAMILY MEDICINE | Facility: CLINIC | Age: 23
End: 2018-06-22

## 2018-06-22 NOTE — TELEPHONE ENCOUNTER
PA Initiation    Medication: Humira - PA Initiated  Insurance Company: [employer] [place] - Phone [phone] Fax [phone]  Pharmacy Filling the Rx:    Filling Pharmacy Phone:    Filling Pharmacy Fax:    Start Date: 6/22/2018

## 2018-06-22 NOTE — TELEPHONE ENCOUNTER
Just a reminder: after you have your first dose of humira please follow up with me in 2 months. If you develop a fever or feel sick after taking the medication go to the emergency room. Do not get live vaccinations while on humira. Please continue your lidex.

## 2018-06-25 ENCOUNTER — TELEPHONE (OUTPATIENT)
Dept: FAMILY MEDICINE | Facility: CLINIC | Age: 23
End: 2018-06-25

## 2018-06-25 DIAGNOSIS — L40.0 PLAQUE PSORIASIS: Primary | ICD-10-CM

## 2018-06-25 RX ORDER — TRIAMCINOLONE ACETONIDE 1 MG/G
CREAM TOPICAL
Qty: 454 G | Refills: 0 | Status: SHIPPED | OUTPATIENT
Start: 2018-06-25 | End: 2018-10-30 | Stop reason: ALTCHOICE

## 2018-06-25 NOTE — TELEPHONE ENCOUNTER
Prior Authorization Approval    Authorization Effective Date: 6/22/2018  Authorization Expiration Date: 6/22/2019  Medication: Humira - PA Approved  Approved Dose/Quantity:   Reference #: [id number]   Insurance Company: [employer] [place] - Phone [phone] Fax [phone]  Expected CoPay:       CoPay Card Available: Yes   Foundation Assistance Needed:    Which Pharmacy is filling the prescription (Not needed for infusion/clinic administered):    Pharmacy Notified: Yes  Patient Notified: Yes

## 2018-06-25 NOTE — TELEPHONE ENCOUNTER
Left message on answering machine for patient to call back.      Sarah Clarke,RN BSN  [facility]  [phone]

## 2018-07-19 ENCOUNTER — ALLIED HEALTH/NURSE VISIT (OUTPATIENT)
Dept: NURSING | Facility: CLINIC | Age: 23
End: 2018-07-19
Payer: COMMERCIAL

## 2018-07-19 VITALS — DIASTOLIC BLOOD PRESSURE: 60 MMHG | SYSTOLIC BLOOD PRESSURE: 100 MMHG

## 2018-07-19 DIAGNOSIS — Z30.42 ON DEPO-PROVERA FOR CONTRACEPTION: Primary | ICD-10-CM

## 2018-07-19 PROCEDURE — 96372 THER/PROPH/DIAG INJ SC/IM: CPT

## 2018-07-19 NOTE — MR AVS SNAPSHOT
After Visit Summary   7/19/2018    Rima Diaz    MRN: [id number]           Patient Information     Date Of Birth          1995        Visit Information        Provider Department      7/19/2018 3:15 PM EC MA/LPN [facility]        Today's Diagnoses     On Depo-Provera for contraception    -  1       Follow-ups after your visit        Who to contact     If you have questions or need follow up information about today's clinic visit or your schedule please contact [facility] directly at [phone].  Normal or non-critical lab and imaging results will be communicated to you by MyChart, letter or phone within 4 business days after the clinic has received the results. If you do not hear from us within 7 days, please contact the clinic through MyChart or phone. If you have a critical or abnormal lab result, we will notify you by phone as soon as possible.  Submit refill requests through [employer] or call your pharmacy and they will forward the refill request to us. Please allow 3 business days for your refill to be completed.          Additional Information About Your Visit        Care EveryWhere ID     This is your Care EveryWhere ID. This could be used by other organizations to access your [place] medical records  [id number]         Blood Pressure from Last 3 Encounters:   07/19/18 100/60   06/21/18 94/61   05/07/18 100/66    Weight from Last 3 Encounters:   04/30/18 106 lb (48.1 kg)   04/15/18 107 lb (48.5 kg)   04/12/18 103 lb 6.3 oz (46.9 kg)              We Performed the Following     C Medroxyprogesterone inj/1mg (J-Code)     THER/PROPH/DIAG INJ, SC/IM        Primary Care Provider Office Phone # Fax #    [facility] [phone]       [address] [facility] [address]        Equal Access to Services     RICKY ALVARENGA AH: Jono maldonadoo Soleena, waaxda luqadaha, qaybta kaalmada meghan gooden  pancho arlettemirela erjimarkus lamarileenaresh ah. So [facility] [phone].    ATENCIÓN: Si rao adrian, tiene a mckeon disposición servicios gratuitos de asistencia lingüística. Will montalvo [phone].    We comply with applicable federal civil rights laws and [place] laws. We do not discriminate on the basis of race, color, national origin, age, disability, sex, sexual orientation, or gender identity.            Thank you!     Thank you for choosing [facility] [place]  for your care. Our goal is always to provide you with excellent care. Hearing back from our patients is one way we can continue to improve our services. Please take a few minutes to complete the written survey that you may receive in the mail after your visit with us. Thank you!             Your Updated Medication List - Protect others around you: Learn how to safely use, store and throw away your medicines at www.disposemymeds.org.          This list is accurate as of 7/19/18  3:25 PM.  Always use your most recent med list.                   Brand Name Dispense Instructions for use Diagnosis    adalimumab 40 MG/0.8ML pen kit    HUMIRA    4 kit    initial dose of 80 mg (2 pens), followed by 40 mg given every other week starting one week after the initial dose    Plaque psoriasis, Arthralgia, unspecified joint       fluocinonide 0.05 % cream    LIDEX    120 g    Apply a thin layer to affected area BID 2-3 weeks. Taper with improvement. Do not use on face or body folds.    Plaque psoriasis       folic acid 1 MG tablet    FOLVITE    100 tablet    Take 1 tablet (1 mg) by mouth daily    Plaque psoriasis, Scalp psoriasis, Inverse psoriasis, Encounter for monitoring of methotrexate therapy       medroxyPROGESTERone 150 MG/ML injection    DEPO-PROVERA    3 mL    Inject 1 mL (150 mg) into the muscle every 3 months    Encounter for initial prescription of injectable contraceptive       triamcinolone 0.1 % cream    KENALOG    454 g    Apply a THIN layer to affected area  BID. Do not use on face or body folds.    Plaque psoriasis

## 2018-07-19 NOTE — PROGRESS NOTES
Follow Up Injection    Patient returning during stated date range given at previous visit: Yes      If here at the correct interval:   BP Readings from Last 1 Encounters:   06/21/18 94/61     Wt Readings from Last 1 Encounters:   04/30/18 106 lb (48.1 kg)         Side effects or problems with last injection?  Spotting  Date range is given to patient for next dose: 10/4 - 10/18    See Medication Note for administration information    Staff Sig: Chris SANCHEZ CMA

## 2018-10-30 ENCOUNTER — OFFICE VISIT (OUTPATIENT)
Dept: FAMILY MEDICINE | Facility: CLINIC | Age: 23
End: 2018-10-30
Payer: COMMERCIAL

## 2018-10-30 VITALS — OXYGEN SATURATION: 100 % | HEART RATE: 73 BPM | SYSTOLIC BLOOD PRESSURE: 100 MMHG | DIASTOLIC BLOOD PRESSURE: 56 MMHG

## 2018-10-30 DIAGNOSIS — M25.50 ARTHRALGIA, UNSPECIFIED JOINT: ICD-10-CM

## 2018-10-30 DIAGNOSIS — L40.0 PLAQUE PSORIASIS: Primary | ICD-10-CM

## 2018-10-30 DIAGNOSIS — Z79.620 ADALIMUMAB (HUMIRA) LONG-TERM USE: ICD-10-CM

## 2018-10-30 DIAGNOSIS — Z51.81 ENCOUNTER FOR THERAPEUTIC DRUG MONITORING: ICD-10-CM

## 2018-10-30 PROCEDURE — 99213 OFFICE O/P EST LOW 20 MIN: CPT | Performed by: FAMILY MEDICINE

## 2018-10-30 RX ORDER — MOMETASONE FUROATE 1 MG/G
CREAM TOPICAL DAILY
Qty: 45 G | Refills: 0 | Status: SHIPPED | OUTPATIENT
Start: 2018-10-30 | End: 2019-06-17

## 2018-10-30 RX ORDER — FLUOCINONIDE 0.5 MG/G
CREAM TOPICAL
Qty: 120 G | Refills: 1 | Status: SHIPPED | OUTPATIENT
Start: 2018-10-30 | End: 2019-06-17

## 2018-10-30 NOTE — PATIENT INSTRUCTIONS
FUTURE APPOINTMENTS  Follow up 2 months after starting Humira injections.    Call [employer] [phone] by Friday if you have not received a call from them.  Call back to clinic [phone] if you are confused.  Call back to clinic within 2-3 weeks if you have not received your medication.    TOPICAL STEROID INSTRUCTIONS - for use on arms, legs and trunk  Fluocinonide (Lidex) 0.05% cream  1. Wash hands before applying topical steroid. Use the adult fingertip unit (FTU) as a guide.  2. Apply sparingly (just enough to rub in) onto affected areas of the arms, legs and trunk, two times per day.  3. Wash off any excess, unused topical steroid.    This higher strength steroid should never be used on face nor groin.    After the initial treatment, topical steroid may be used as needed for flare-ups but only for short-term treatment.    If you are using this for prolonged periods of time to control flare-ups, return to clinic for re-evaluation of treatment.    Keep in mind to also regularly use moisturizer, as this preventative measure can help maintain your skin's natural protective moisture barrier.    TOPICAL STEROID INSTRUCTIONS - for use on face  Mometasone 0.1% cream  1. Wash hands before applying topical steroid.  2. Apply sparingly (just enough to rub in) onto affected areas of the face once daily.  3. Wash off any excess, unused topical steroid.    This is a weaker strength steroid, and it can should used on thinner skin areas such as the face.    After the initial treatment, topical steroid may be used as needed for flare-ups but only for short-term treatment.    If you are using this for prolonged periods of time to control flare-ups, return to clinic for re-evaluation of treatment.    Keep in mind to also regularly use moisturizer, as this preventative measure can help maintain your skin's natural protective moisture barrier.    OVER-THE-COUNTER (OTC) SUPPLEMENTS  Take by mouth a supplement of Vitamin D3  1000 IU/day.

## 2018-10-30 NOTE — MR AVS SNAPSHOT
After Visit Summary   10/30/2018    Rima Diaz    MRN: [id number]           Patient Information     Date Of Birth          1995        Visit Information        Provider Department      10/30/2018 12:20 PM Tabatha Castellanos MD [facility]        Today's Diagnoses     Plaque psoriasis    -  1    Adalimumab (Humira) long-term use        Encounter for therapeutic drug monitoring        Arthralgia, unspecified joint          Care Instructions    FUTURE APPOINTMENTS  Follow up 2 months after starting Humira injections.    Call [employer] [phone] by Friday if you have not received a call from them.  Call back to clinic [phone] if you are confused.  Call back to clinic within 2-3 weeks if you have not received your medication.    TOPICAL STEROID INSTRUCTIONS - for use on arms, legs and trunk  Fluocinonide (Lidex) 0.05% cream  1. Wash hands before applying topical steroid. Use the adult fingertip unit (FTU) as a guide.  2. Apply sparingly (just enough to rub in) onto affected areas of the arms, legs and trunk, two times per day.  3. Wash off any excess, unused topical steroid.    This higher strength steroid should never be used on face nor groin.    After the initial treatment, topical steroid may be used as needed for flare-ups but only for short-term treatment.    If you are using this for prolonged periods of time to control flare-ups, return to clinic for re-evaluation of treatment.    Keep in mind to also regularly use moisturizer, as this preventative measure can help maintain your skin's natural protective moisture barrier.    TOPICAL STEROID INSTRUCTIONS - for use on face  Mometasone 0.1% cream  1. Wash hands before applying topical steroid.  2. Apply sparingly (just enough to rub in) onto affected areas of the face once daily.  3. Wash off any excess, unused topical steroid.    This is a weaker strength steroid, and it can should used on thinner skin  areas such as the face.    After the initial treatment, topical steroid may be used as needed for flare-ups but only for short-term treatment.    If you are using this for prolonged periods of time to control flare-ups, return to clinic for re-evaluation of treatment.    Keep in mind to also regularly use moisturizer, as this preventative measure can help maintain your skin's natural protective moisture barrier.    OVER-THE-COUNTER (OTC) SUPPLEMENTS  Take by mouth a supplement of Vitamin D3 1000 IU/day.          Follow-ups after your visit        Who to contact     If you have questions or need follow up information about today's clinic visit or your schedule please contact [facility] [place] directly at [phone].  Normal or non-critical lab and imaging results will be communicated to you by MyChart, letter or phone within 4 business days after the clinic has received the results. If you do not hear from us within 7 days, please contact the clinic through MyChart or phone. If you have a critical or abnormal lab result, we will notify you by phone as soon as possible.  Submit refill requests through [employer] or call your pharmacy and they will forward the refill request to us. Please allow 3 business days for your refill to be completed.          Additional Information About Your Visit        Care EveryWhere ID     This is your Care EveryWhere ID. This could be used by other organizations to access your [place] medical records  [id number]        Your Vitals Were     Pulse Pulse Oximetry Breastfeeding?             73 100% No          Blood Pressure from Last 3 Encounters:   10/30/18 100/56   07/19/18 100/60   06/21/18 94/61    Weight from Last 3 Encounters:   04/30/18 106 lb (48.1 kg)   04/15/18 107 lb (48.5 kg)   04/12/18 103 lb 6.3 oz (46.9 kg)              Today, you had the following     No orders found for display         Today's Medication Changes          These changes are accurate as of  10/30/18 12:35 PM.  If you have any questions, ask your nurse or doctor.               Start taking these medicines.        Dose/Directions    mometasone 0.1 % cream   Commonly known as:  ELOCON   Used for:  Plaque psoriasis   Started by:  Tabatha Castellanos MD        Apply topically daily Apply to AA on face once per day when needed   Quantity:  45 g   Refills:  0         Stop taking these medicines if you haven't already. Please contact your care team if you have questions.     folic acid 1 MG tablet   Commonly known as:  FOLVITE   Stopped by:  Tabatha Castellanos MD           triamcinolone 0.1 % cream   Commonly known as:  KENALOG   Stopped by:  Tabatha Castellanos MD                Where to get your medicines      These medications were sent to [facility] - [place], [place] - [address]  [address] [facility] [address]    Hours:  Mon-Fri 8:30am-5:00pm Toll Free [phone] Phone:  [phone]     fluocinonide 0.05 % cream    mometasone 0.1 % cream                Primary Care Provider Office Phone # Fax #    [facility] [phone]       [address] [facility] [address]        Equal Access to Services     RICKY ALVARENGA AH: Hadii aad ku hadasho Soomaali, waaxda luqadaha, qaybta kaalmada adeegyada, waxay idiin hayaan marito gray . So [facility] [phone].    ATENCIÓN: Si habla español, tiene a mckeon disposición servicios gratuitos de asistencia lingüística. Llame al [phone].    We comply with applicable federal civil rights laws and [place] laws. We do not discriminate on the basis of race, color, national origin, age, disability, sex, sexual orientation, or gender identity.            Thank you!     Thank you for choosing [facility]  for your care. Our goal is always to provide you with excellent care. Hearing back from our patients is one way we can continue to improve  our services. Please take a few minutes to complete the written survey that you may receive in the mail after your visit with us. Thank you!             Your Updated Medication List - Protect others around you: Learn how to safely use, store and throw away your medicines at www.disposemymeds.org.          This list is accurate as of 10/30/18 12:35 PM.  Always use your most recent med list.                   Brand Name Dispense Instructions for use Diagnosis    adalimumab 40 MG/0.8ML pen kit    HUMIRA    4 kit    initial dose of 80 mg (2 pens), followed by 40 mg given every other week starting one week after the initial dose    Plaque psoriasis, Arthralgia, unspecified joint       fluocinonide 0.05 % cream    LIDEX    120 g    Apply a thin layer to affected area BID 2-3 weeks. Taper with improvement. Do not use on face or body folds.    Plaque psoriasis       medroxyPROGESTERone 150 MG/ML injection    DEPO-PROVERA    3 mL    Inject 1 mL (150 mg) into the muscle every 3 months    Encounter for initial prescription of injectable contraceptive       mometasone 0.1 % cream    ELOCON    45 g    Apply topically daily Apply to AA on face once per day when needed    Plaque psoriasis

## 2018-10-30 NOTE — NURSING NOTE
Called [facility]- they state they did not have the insurance information or the pt home phone number-  Faxed all of this information to the [employer] fax at [phone]   humira ambassador form faxed to [employer].    Advised patient to cll Friday if no word from pharmacy or [employer].    Sarah Clarke RN BSN  [facility]  [phone]

## 2018-10-30 NOTE — LETTER
10/30/2018         RE: Rima Diaz  [address] [facility]  [address] [place] [address]        Dear Colleague,    Thank you for referring your patient, Rima Diaz, to the [facility]. Please see a copy of my visit note below.    [facility] - PRIMARY CARE SKIN    CC : Psoriasis   SUBJECTIVE:                                                    Rima Diaz is a 23 year old female who presents to clinic today for follow-up of plaque psoriasis that started years ago, but has flared since a pregnancy in 2017. Plaques have persisted. Achy joints are most intense of the knees.    Locations : Scalp, Face, Neck, Trunk, Arms, Legs, Hands, Buttocks and Groin.    Current treatment :   Treatment was to be initiated 6/22/2018 (PA effective through 6/22/19), but she has not received any medication. There has been confusion regarding delivery of the Humira, in addition she needs instructions.  Response to treatment : n/a  Side effects noted : n/a  Other treatments : She is still using topical steroid fluocinonide 0.05% ointment.     Previous therapies tried : topical steroids, methotrexate. Methotrexate was discontinued due to side effects.    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    Patient Active Problem List   Diagnosis     Other and unspecified alcohol dependence, unspecified drinking behavior     Cannabis dependence (H)     Homicidal ideation     Anxiety     Mood disorder (H)     Polysubstance dependence (H)     Disruptive behavior disorder     ADHD (attention deficit hyperactivity disorder)     Major depressive disorder, recurrent episode, moderate (H)     Psoriasis     Suicide attempt by substance overdose,  initial encounter (H)     Attempted suicide (H)     On Depo-Provera for contraception       Past Medical History:   Diagnosis Date     ADHD (attention deficit hyperactivity disorder)      Alcohol abuse      Anxiety      Benzodiazepine abuse, continuous (H)      Depression      Dextromethorphan overdose, intentional self-harm, initial encounter (H) 2018     History of suicidal ideation      Opiate abuse, episodic (H)      Psoriasis     Past Surgical History:   Procedure Laterality Date      SECTION N/A 8/3/2017    Procedure:  SECTION;;  Surgeon: Alivia Arizmendi MD;  Location:  L+D      Social History   Substance Use Topics     Smoking status: Former Smoker     Types: Other     Quit date: 2016     Smokeless tobacco: Never Used     Alcohol use No      Comment: recovering    Family History     Problem (# of Occurrences) Relation (Name,Age of Onset)    Suicide (1) Other: sister 2014           Prescription Medications as of 10/30/2018             fluocinonide (LIDEX) 0.05 % cream Apply a thin layer to affected area BID 2-3 weeks. Taper with improvement. Do not use on face or body folds.    medroxyPROGESTERone (DEPO-PROVERA) 150 MG/ML injection Inject 1 mL (150 mg) into the muscle every 3 months    mometasone (ELOCON) 0.1 % cream Apply topically daily Apply to AA on face once per day when needed    adalimumab (HUMIRA) 40 MG/0.8ML pen kit initial dose of 80 mg (2 pens), followed by 40 mg given every other week starting one week after the initial dose          No Known Allergies     INTEGUMENTARY/SKIN: POSITIVE for pruritis, rash and scaling  MUSCULOSKELETAL: POSITIVE for arthralgias  ROS : 14 point review of systems was negative except the symptoms listed above in the HPI.    This document serves as a record of the services and decisions personally performed and made by Roselyn Castellanos MD. It was created on her behalf by Ellis Varghese, a trained medical scribe.  The creation of this document is based  on the scribe's personal observations and the provider's statements to the medical scribe.  Ellis Varghese, October 30, 2018 12:17 PM      OBJECTIVE:                                                    GENERAL: healthy, alert and no distress  SKIN: Cortes Skin Type - IV.  Face, Neck, Arms and Legs were examined. The dermatoscope was used to help evaluate pigmented lesions.  Skin Pertinent Findings:  Scaly erythematous plaque mid-portion of face  Scattered plaques on arms, legs                          BSA : 40% involvement  Diagnostic Test Results:  none           ASSESSMENT:                                                      Encounter Diagnoses   Name Primary?     Plaque psoriasis Yes     Adalimumab (Humira) long-term use      Encounter for therapeutic drug monitoring      Arthralgia, unspecified joint        PLAN:                                                    Patient Instructions   FUTURE APPOINTMENTS  Follow up 2 months after starting Humira injections.    Call [facility] [phone] by Friday if you have not received a call from them.  Call back to clinic [phone] if you are confused.  Call back to clinic within 2-3 weeks if you have not received your medication.    TOPICAL STEROID INSTRUCTIONS - for use on arms, legs and trunk  Fluocinonide (Lidex) 0.05% cream  1. Wash hands before applying topical steroid. Use the adult fingertip unit (FTU) as a guide.  2. Apply sparingly (just enough to rub in) onto affected areas of the arms, legs and trunk, two times per day.  3. Wash off any excess, unused topical steroid.    This higher strength steroid should never be used on face nor groin.    After the initial treatment, topical steroid may be used as needed for flare-ups but only for short-term treatment.    If you are using this for prolonged periods of time to control flare-ups, return to clinic for re-evaluation of treatment.    Keep in mind to also regularly use moisturizer, as this preventative  measure can help maintain your skin's natural protective moisture barrier.    TOPICAL STEROID INSTRUCTIONS - for use on face  Mometasone 0.1% cream  1. Wash hands before applying topical steroid.  2. Apply sparingly (just enough to rub in) onto affected areas of the face once daily.  3. Wash off any excess, unused topical steroid.    This is a weaker strength steroid, and it can should used on thinner skin areas such as the face.    After the initial treatment, topical steroid may be used as needed for flare-ups but only for short-term treatment.    If you are using this for prolonged periods of time to control flare-ups, return to clinic for re-evaluation of treatment.    Keep in mind to also regularly use moisturizer, as this preventative measure can help maintain your skin's natural protective moisture barrier.    OVER-THE-COUNTER (OTC) SUPPLEMENTS  Take by mouth a supplement of Vitamin D3 1000 IU/day.      PROCEDURES:                                                    None.    TT : 20 minutes.  CT : 15 minutes. Discussion regarding etiology, spectrum of psoriasis, treatment options, aggravating factors.      The information in this document, created by the medical scribe for me, accurately reflects the services I personally performed and the decisions made by me. I have reviewed and approved this document for accuracy prior to leaving the patient care area.  Roselyn Castellanos MD October 30, 2018 12:17 PM  [facility] [place]    Again, thank you for allowing me to participate in the care of your patient.        Sincerely,        Tabatha Castellanos MD

## 2018-10-30 NOTE — PROGRESS NOTES
[facility] - PRIMARY CARE SKIN    CC : Psoriasis   SUBJECTIVE:                                                    Rima Diaz is a 23 year old female who presents to clinic today for follow-up of plaque psoriasis that started years ago, but has flared since a pregnancy in 2017. Plaques have persisted. Achy joints are most intense of the knees.    Locations : Scalp, Face, Neck, Trunk, Arms, Legs, Hands, Buttocks and Groin.    Current treatment :   Treatment was to be initiated 6/22/2018 (PA effective through 6/22/19), but she has not received any medication. There has been confusion regarding delivery of the Humira, in addition she needs instructions.  Response to treatment : n/a  Side effects noted : n/a  Other treatments : She is still using topical steroid fluocinonide 0.05% ointment.     Previous therapies tried : topical steroids, methotrexate. Methotrexate was discontinued due to side effects.    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    Patient Active Problem List   Diagnosis     Other and unspecified alcohol dependence, unspecified drinking behavior     Cannabis dependence (H)     Homicidal ideation     Anxiety     Mood disorder (H)     Polysubstance dependence (H)     Disruptive behavior disorder     ADHD (attention deficit hyperactivity disorder)     Major depressive disorder, recurrent episode, moderate (H)     Psoriasis     Suicide attempt by substance overdose, initial encounter (H)     Attempted suicide (H)     On Depo-Provera for contraception       Past Medical History:   Diagnosis Date     ADHD (attention deficit hyperactivity disorder)      Alcohol abuse      Anxiety      Benzodiazepine abuse, continuous (H)       Depression      Dextromethorphan overdose, intentional self-harm, initial encounter (H) 2018     History of suicidal ideation      Opiate abuse, episodic (H)      Psoriasis     Past Surgical History:   Procedure Laterality Date      SECTION N/A 8/3/2017    Procedure:  SECTION;;  Surgeon: Alivia Arizmendi MD;  Location:  L+D      Social History   Substance Use Topics     Smoking status: Former Smoker     Types: Other     Quit date: 2016     Smokeless tobacco: Never Used     Alcohol use No      Comment: recovering    Family History     Problem (# of Occurrences) Relation (Name,Age of Onset)    Suicide (1) Other: sister 2014           Prescription Medications as of 10/30/2018             fluocinonide (LIDEX) 0.05 % cream Apply a thin layer to affected area BID 2-3 weeks. Taper with improvement. Do not use on face or body folds.    medroxyPROGESTERone (DEPO-PROVERA) 150 MG/ML injection Inject 1 mL (150 mg) into the muscle every 3 months    mometasone (ELOCON) 0.1 % cream Apply topically daily Apply to AA on face once per day when needed    adalimumab (HUMIRA) 40 MG/0.8ML pen kit initial dose of 80 mg (2 pens), followed by 40 mg given every other week starting one week after the initial dose          No Known Allergies     INTEGUMENTARY/SKIN: POSITIVE for pruritis, rash and scaling  MUSCULOSKELETAL: POSITIVE for arthralgias  ROS : 14 point review of systems was negative except the symptoms listed above in the HPI.    This document serves as a record of the services and decisions personally performed and made by Roselyn Castellanos MD. It was created on her behalf by Ellis Varghese, a trained medical scribe.  The creation of this document is based on the scribe's personal observations and the provider's statements to the medical scribe.  Ellis Varghese, 2018 12:17 PM      OBJECTIVE:                                                    GENERAL: healthy, alert and no distress  SKIN: Cortes  Skin Type - IV.  Face, Neck, Arms and Legs were examined. The dermatoscope was used to help evaluate pigmented lesions.  Skin Pertinent Findings:  Scaly erythematous plaque mid-portion of face  Scattered plaques on arms, legs                          BSA : 40% involvement  Diagnostic Test Results:  none           ASSESSMENT:                                                      Encounter Diagnoses   Name Primary?     Plaque psoriasis Yes     Adalimumab (Humira) long-term use      Encounter for therapeutic drug monitoring      Arthralgia, unspecified joint        PLAN:                                                    Patient Instructions   FUTURE APPOINTMENTS  Follow up 2 months after starting Humira injections.    Call [facility] [phone] by Friday if you have not received a call from them.  Call back to clinic [phone] if you are confused.  Call back to clinic within 2-3 weeks if you have not received your medication.    TOPICAL STEROID INSTRUCTIONS - for use on arms, legs and trunk  Fluocinonide (Lidex) 0.05% cream  1. Wash hands before applying topical steroid. Use the adult fingertip unit (FTU) as a guide.  2. Apply sparingly (just enough to rub in) onto affected areas of the arms, legs and trunk, two times per day.  3. Wash off any excess, unused topical steroid.    This higher strength steroid should never be used on face nor groin.    After the initial treatment, topical steroid may be used as needed for flare-ups but only for short-term treatment.    If you are using this for prolonged periods of time to control flare-ups, return to clinic for re-evaluation of treatment.    Keep in mind to also regularly use moisturizer, as this preventative measure can help maintain your skin's natural protective moisture barrier.    TOPICAL STEROID INSTRUCTIONS - for use on face  Mometasone 0.1% cream  1. Wash hands before applying topical steroid.  2. Apply sparingly (just enough to rub in) onto affected areas  of the face once daily.  3. Wash off any excess, unused topical steroid.    This is a weaker strength steroid, and it can should used on thinner skin areas such as the face.    After the initial treatment, topical steroid may be used as needed for flare-ups but only for short-term treatment.    If you are using this for prolonged periods of time to control flare-ups, return to clinic for re-evaluation of treatment.    Keep in mind to also regularly use moisturizer, as this preventative measure can help maintain your skin's natural protective moisture barrier.    OVER-THE-COUNTER (OTC) SUPPLEMENTS  Take by mouth a supplement of Vitamin D3 1000 IU/day.      PROCEDURES:                                                    None.    TT : 20 minutes.  CT : 15 minutes. Discussion regarding etiology, spectrum of psoriasis, treatment options, aggravating factors.      The information in this document, created by the medical scribe for me, accurately reflects the services I personally performed and the decisions made by me. I have reviewed and approved this document for accuracy prior to leaving the patient care area.  Roselyn Castellanos MD October 30, 2018 12:17 PM  [facility] [place]

## 2018-11-06 ENCOUNTER — MEDICAL CORRESPONDENCE (OUTPATIENT)
Dept: HEALTH INFORMATION MANAGEMENT | Facility: CLINIC | Age: 23
End: 2018-11-06

## 2018-11-16 ENCOUNTER — TELEPHONE (OUTPATIENT)
Dept: FAMILY MEDICINE | Facility: CLINIC | Age: 23
End: 2018-11-16

## 2018-11-16 NOTE — TELEPHONE ENCOUNTER
Sent pa to: [email or web address] ([email or web address], [phone]    Sarah Clarke RN BSN  [facility]  [phone]

## 2018-11-16 NOTE — TELEPHONE ENCOUNTER
Rima Diaz  MRN:   [id number]  Female, 23 year old, 1995    Prior Authorization Specialty Medication Request    Medication/Dose: Humira  ICD code (if different than what is on RX):    Previously Tried and Failed: She is still using topical steroid fluocinonide 0.05% ointment.      Previous therapies tried : topical steroids, methotrexate. Methotrexate was discontinued due to side effects.     Important Lab Values:   Rationale:     Insurance Name  Coverage Information    Coverage information:     Subscriber: [id number] NANDA ZAVALETA     Rel to sub: 03 - Child     Member ID: [id number]     Payor: [facility] Ph: [phone]     Benefit plan: [facility] [employer]/BLUE LINK Ph: [phone]     Group number: [id number]     Member effective dates: from 01/01/18          Pharmacy Information (if different than what is on RX)  Name:    Phone:

## 2018-11-19 NOTE — TELEPHONE ENCOUNTER
Per our PA team:    I was trying to follow up and see why a PA is needed for this pt. A PA was done on 6/22/18 with approval from 6/22/18 - 6/22/19 so I m a little confused and to where this PA request is coming from. Do you have any additional insight for me?    Thanks,  Link      Link Gottbeheat, [facility]  Pharmacy Liaison  [address] [address]  [email or web address]   [email or web address]   Office: [phone]  Fax: [phone]    Connect with [facility] on social media.
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
## 2018-12-11 DIAGNOSIS — L40.0 PLAQUE PSORIASIS: ICD-10-CM

## 2018-12-11 NOTE — TELEPHONE ENCOUNTER
Reason for Call:  Medication or medication refill:    Do you use a [facility]?  Name of the pharmacy and phone number for the current request:  [facility] (Ph: [phone])    Name of the medication requested: Triamcinolone    Other request:   LAST REFILL: 06/26/2018  LOV: 06/21/2018    Can we leave a detailed message on this number? Not Applicable    Phone number patient can be reached at: Home number on file [phone] (home)    Best Time: NA    Call taken on 12/11/2018 at 8:21 AM by Denise Behrendt

## 2018-12-12 RX ORDER — TRIAMCINOLONE ACETONIDE 1 MG/G
CREAM TOPICAL
Qty: 454 G | Refills: 0 | Status: SHIPPED | OUTPATIENT
Start: 2018-12-12 | End: 2019-01-23

## 2018-12-12 NOTE — TELEPHONE ENCOUNTER
"  Routing refill request to provider for review/approval because:  Drug not active on patient's medication list  triamcinolone (KENALOG) 0.1 % cream (Discontinued) 454 g 0 6/25/2018 10/30/2018 --   Sig: Apply a THIN layer to affected area BID. Do not use on face or body folds.   Patient not taking: Reported on 10/30/2018        Sent to pharmacy as: triamcinolone (KENALOG) 0.1 % cream   Class: E-Prescribe   Reason for Discontinue: Alternate therapy   Order: [id number]   E-Prescribing Status: Receipt confirmed by pharmacy (6/25/2018  6:29 PM CDT)             Requested Prescriptions   Pending Prescriptions Disp Refills     triamcinolone (KENALOG) 0.1 % external cream 454 g 0     Sig: Apply a THIN layer to affected area BID. Do not use on face or body folds.    Topical Steroids and Nonsteroidals Protocol Passed - 12/11/2018  9:53 AM       Passed - Patient is age 6 or older       Passed - Authorizing prescriber's most recent note related to this medication read.    If refill request is for ophthalmic use, please forward request to provider for approval.         Passed - High potency steroid not ordered       Passed - Recent (12 mo) or future (30 days) visit within the authorizing provider's specialty    Patient had office visit in the last 12 months or has a visit in the next 30 days with authorizing provider or within the authorizing provider's specialty.  See \"Patient Info\" tab in inbasket, or \"Choose Columns\" in Meds & Orders section of the refill encounter.              Last Written Prescription Date:  06/25/18,  Last Fill Quantity:  454 g  # refills: 0   Last office visit: 10/30/2018 with prescribing provider:     Future Office Visit:      "

## 2019-01-23 ENCOUNTER — OFFICE VISIT (OUTPATIENT)
Dept: FAMILY MEDICINE | Facility: CLINIC | Age: 24
End: 2019-01-23
Payer: COMMERCIAL

## 2019-01-23 VITALS
TEMPERATURE: 98.5 F | WEIGHT: 122 LBS | DIASTOLIC BLOOD PRESSURE: 70 MMHG | HEART RATE: 76 BPM | SYSTOLIC BLOOD PRESSURE: 110 MMHG | BODY MASS INDEX: 20.94 KG/M2

## 2019-01-23 DIAGNOSIS — Z30.42 ON DEPO-PROVERA FOR CONTRACEPTION: ICD-10-CM

## 2019-01-23 DIAGNOSIS — Z11.3 SCREENING EXAMINATION FOR VENEREAL DISEASE: Primary | ICD-10-CM

## 2019-01-23 LAB — BETA HCG QUAL IFA URINE: NEGATIVE

## 2019-01-23 PROCEDURE — 99213 OFFICE O/P EST LOW 20 MIN: CPT | Performed by: FAMILY MEDICINE

## 2019-01-23 PROCEDURE — 87591 N.GONORRHOEAE DNA AMP PROB: CPT | Performed by: FAMILY MEDICINE

## 2019-01-23 PROCEDURE — 87491 CHLMYD TRACH DNA AMP PROBE: CPT | Performed by: FAMILY MEDICINE

## 2019-01-23 PROCEDURE — 84703 CHORIONIC GONADOTROPIN ASSAY: CPT | Performed by: FAMILY MEDICINE

## 2019-01-23 PROCEDURE — 96372 THER/PROPH/DIAG INJ SC/IM: CPT | Performed by: FAMILY MEDICINE

## 2019-01-23 RX ORDER — MEDROXYPROGESTERONE ACETATE 150 MG/ML
150 INJECTION, SUSPENSION INTRAMUSCULAR
Status: DISCONTINUED | OUTPATIENT
Start: 2019-01-23 | End: 2019-06-17

## 2019-01-23 RX ADMIN — MEDROXYPROGESTERONE ACETATE 150 MG: 150 INJECTION, SUSPENSION INTRAMUSCULAR at 14:56

## 2019-01-23 ASSESSMENT — ANXIETY QUESTIONNAIRES
1. FEELING NERVOUS, ANXIOUS, OR ON EDGE: NEARLY EVERY DAY
GAD7 TOTAL SCORE: 8
5. BEING SO RESTLESS THAT IT IS HARD TO SIT STILL: SEVERAL DAYS
IF YOU CHECKED OFF ANY PROBLEMS ON THIS QUESTIONNAIRE, HOW DIFFICULT HAVE THESE PROBLEMS MADE IT FOR YOU TO DO YOUR WORK, TAKE CARE OF THINGS AT HOME, OR GET ALONG WITH OTHER PEOPLE: SOMEWHAT DIFFICULT
2. NOT BEING ABLE TO STOP OR CONTROL WORRYING: SEVERAL DAYS
3. WORRYING TOO MUCH ABOUT DIFFERENT THINGS: SEVERAL DAYS
6. BECOMING EASILY ANNOYED OR IRRITABLE: SEVERAL DAYS
7. FEELING AFRAID AS IF SOMETHING AWFUL MIGHT HAPPEN: NOT AT ALL

## 2019-01-23 ASSESSMENT — PATIENT HEALTH QUESTIONNAIRE - PHQ9
SUM OF ALL RESPONSES TO PHQ QUESTIONS 1-9: 3
5. POOR APPETITE OR OVEREATING: SEVERAL DAYS

## 2019-01-23 NOTE — PROGRESS NOTES
depo  SUBJECTIVE:   Rima Diaz is a 24 year old female who presents to clinic today for the following health issues:      Concern - birth control  Discuss bc options. Did not like weight gain with depo provera. Last injection 7/19/18.     Patient however still does not think pills might not be a good option for her as she forgets.  She would like to try Depo-Provera again.  Her last menstrual period was 3 weeks ago.  Denies any new symptoms.  She sees psychologist and psychiatrist as per patient.    Problem list and histories reviewed & adjusted, as indicated.          Reviewed and updated as needed this visit by clinical staff  Tobacco  Allergies  Meds  Soc Hx      Reviewed and updated as needed this visit by Provider         ROS:  CONSTITUTIONAL: NEGATIVE for fever, chills, change in weight  ENT/MOUTH: NEGATIVE for ear, mouth and throat problems  RESP: NEGATIVE for significant cough or SOB  CV: NEGATIVE for chest pain, palpitations or peripheral edema    OBJECTIVE:                                                    /70   Pulse 76   Temp 98.5  F (36.9  C) (Tympanic)   Wt 55.3 kg (122 lb)   LMP 01/09/2019 (Approximate)   BMI 20.94 kg/m    Body mass index is 20.94 kg/m .   GENERAL: healthy, alert, well nourished, well hydrated, no distress  NECK: no tenderness, no adenopathy, no asymmetry, no masses, no stiffness; thyroid- normal to palpation  RESP: lungs clear to auscultation - no rales, no rhonchi, no wheezes  CV: regular rates and rhythm, normal S1 S2, no S3 or S4 and no murmur, no click or rub -       ASSESSMENT/PLAN:                                                        ICD-10-CM    1. Contraceptive management Z30.9 Beta HCG Qual, Urine - [facility] and [place] ([id number])     NEISSERIA GONORRHOEA PCR     CHLAMYDIA TRACHOMATIS PCR   2. Screening examination for venereal disease Z11.3 NEISSERIA GONORRHOEA PCR     CHLAMYDIA TRACHOMATIS PCR   3. On Depo-Provera for contraception Z30.42       Discussed with the patient regarding contraceptive options.  Injection would be one which has some side effects of weight gain and if prolonged use some bone mineral density deterioration.  However she has some issues with remembering the pills and she would like to continue with Depo-Provera injection.  Side effects were discussed.  Her pregnancy test is negative and she was given Depakote shot and advised to follow-up as per schedule.    Elmer Padgett MD  [facility] [place]

## 2019-01-24 LAB
C TRACH DNA SPEC QL NAA+PROBE: NEGATIVE
N GONORRHOEA DNA SPEC QL NAA+PROBE: NEGATIVE
SPECIMEN SOURCE: NORMAL
SPECIMEN SOURCE: NORMAL

## 2019-01-24 ASSESSMENT — ANXIETY QUESTIONNAIRES: GAD7 TOTAL SCORE: 8

## 2019-05-23 ENCOUNTER — TELEPHONE (OUTPATIENT)
Dept: FAMILY MEDICINE | Facility: CLINIC | Age: 24
End: 2019-05-23

## 2019-05-23 NOTE — TELEPHONE ENCOUNTER
Reason for Call:  Medication or medication refill:    Do you use a [facility]?  Name of the pharmacy and phone number for the current request:  [facility]    Name of the medication requested: Humira 40 MG per .08 ML    Other request: Calling to verify if pt will need to restart her loading dose again because we have not filled this RX since 11/30/2018.   If she will need to restart her RX please send a new RX.    Can we leave a detailed message on this number? YES    Phone number patient can be reached at: Other phone number:  [phone] select option #2 then option #1 for the pharmacist    Best Time: any    Call taken on 5/23/2019 at 4:27 PM by Lisha Pelaez

## 2019-05-28 NOTE — TELEPHONE ENCOUNTER
Left message on answering machine for patient to call back.    Sarah Clarke,RN BSN  [facility]  [phone]

## 2019-05-28 NOTE — TELEPHONE ENCOUNTER
Called and left a voice mail: if this is for the Humira you will need to schedule with Denisa Alvarado, not Bishnu Rico- please call back and clarify.    Sarah OCONNELL RN  [facility]  [phone]

## 2019-05-28 NOTE — TELEPHONE ENCOUNTER
Reason for Call:  Other call back    Detailed comments: pt set up an appt on 5/30 with Alberto Rico but she also needs labs done and their are no orders. Can we please get some orders that way she can do lab same day? Please give pt a call to help set up lab when orders are in. Thank you    Phone Number Patient can be reached at: Home number on file [phone] (home)    Best Time: any    Can we leave a detailed message on this number? YES    Call taken on 5/28/2019 at 2:36 PM by Omaira Blanton

## 2019-05-29 NOTE — TELEPHONE ENCOUNTER
Pharmacy calling to ask for a new starter pack-  advised that patient needs to be seen to restart the medication    Last refill from [facility] on 12/3/18 - given 2 pens (one pen every other week)    Advised that patient needs to be seen and I have left voice mails with her to schedule with the Skin clinic not family practice.    Sarah ClarkeRN BSN  [facility]  [phone]

## 2019-06-17 ENCOUNTER — OFFICE VISIT (OUTPATIENT)
Dept: FAMILY MEDICINE | Facility: CLINIC | Age: 24
End: 2019-06-17
Payer: COMMERCIAL

## 2019-06-17 VITALS — DIASTOLIC BLOOD PRESSURE: 64 MMHG | SYSTOLIC BLOOD PRESSURE: 122 MMHG

## 2019-06-17 DIAGNOSIS — L40.9 PSORIASIS: Primary | ICD-10-CM

## 2019-06-17 DIAGNOSIS — Z51.81 ENCOUNTER FOR MONITORING OF ADALIMUMAB THERAPY: ICD-10-CM

## 2019-06-17 DIAGNOSIS — Z79.620 ENCOUNTER FOR MONITORING OF ADALIMUMAB THERAPY: ICD-10-CM

## 2019-06-17 PROCEDURE — 36415 COLL VENOUS BLD VENIPUNCTURE: CPT | Performed by: FAMILY MEDICINE

## 2019-06-17 PROCEDURE — 86481 TB AG RESPONSE T-CELL SUSP: CPT | Performed by: FAMILY MEDICINE

## 2019-06-17 PROCEDURE — 99213 OFFICE O/P EST LOW 20 MIN: CPT | Performed by: FAMILY MEDICINE

## 2019-06-17 NOTE — LETTER
6/17/2019         RE: Rima Diaz  [address]  [address] [place] [address]        Dear Colleague,    Thank you for referring your patient, Rima Diaz, to the [facility] [place]. Please see a copy of my visit note below.        [facility] - PRIMARY CARE SKIN    CC: psoriasis  SUBJECTIVE:   Rima Diaz is a(n) 24 year old female who presents to clinic today for follow-up of psoriasis that started years ago.    Areas of skin involvement: Scalp, Face, Neck, Trunk, Arms, Legs, Hands, Buttocks and Groin.  Joint aches: YES.    Current treatment: She reports that she is on Humira, although her last injection may have been in January.  Response to treatment: She reports that psoriasis patches had improved while on Humira.    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    Patient Active Problem List   Diagnosis     Other and unspecified alcohol dependence, unspecified drinking behavior     Cannabis dependence (H)     Homicidal ideation     Anxiety     Mood disorder (H)     Polysubstance dependence (H)     Disruptive behavior disorder     ADHD (attention deficit hyperactivity disorder)     Major depressive disorder, recurrent episode, moderate (H)     Psoriasis     Suicide attempt by substance overdose, initial encounter (H)     Attempted suicide (H)     On Depo-Provera for contraception       Past Medical History:   Diagnosis Date     ADHD (attention deficit hyperactivity disorder)      Alcohol abuse      Anxiety      Benzodiazepine abuse, continuous (H)      Depression      Dextromethorphan overdose, intentional self-harm, initial encounter (H) 04/2018     History of suicidal ideation      Opiate abuse,  episodic (H)      Psoriasis     Past Surgical History:   Procedure Laterality Date      SECTION N/A 8/3/2017    Procedure:  SECTION;;  Surgeon: Alivia Arizmendi MD;  Location:  L+D      Social History     Tobacco Use     Smoking status: Former Smoker     Types: Other     Last attempt to quit: 2016     Years since quittin.6     Smokeless tobacco: Never Used   Substance Use Topics     Alcohol use: No     Alcohol/week: 0.0 oz     Comment: recovering     Drug use: No     Types: Methamphetamines, Opiates, Amphetamines, Benzodiazepines, Hydrocodone, PCP     Comment: everything is former     Family History     Problem (# of Occurrences) Relation (Name,Age of Onset)    Suicide (1) Other: sister 2014           Current Outpatient Medications   Medication Sig Dispense Refill     adalimumab (HUMIRA) 40 MG/0.8ML pen kit initial dose of 80 mg (2 pens), followed by 40 mg given every other week starting one week after the initial dose 4 kit 3       No Known Allergies     ROS: 14 point review of systems was negative except the symptoms listed above in the HPI.    This document serves as a record of the services and decisions personally performed and made by Tabatha Castellanos MD and was created by Ellis Varghese, a trained medical scribe, based on personal observations and provider statements to the medical scribe.  2019 3:48 PM   Ellis Varghese    OBJECTIVE:   GENERAL: healthy, alert and no distress.  SKIN: Cortes Skin Type - III-IV.  Face, Neck, Trunk, Arms and Legs examined. The dermatoscope was used to help evaluate pigmented lesions.  Skin Pertinent Findings:  Scattered scaly erythematous plaques on arms, legs. Multiple plaques on back and abdomen.    Diagnostic Test Results:  No results found for this or any previous visit (from the past 24 hour(s)).    ASSESSMENT:     Encounter Diagnoses   Name Primary?     Psoriasis Yes     Encounter for monitoring of adalimumab therapy      MDM:  .  Discussion regarding etiology, spectrum of psoriasis, treatment options, aggravating factors.    PLAN:   Patient Instructions   FUTURE APPOINTMENTS  Please get labs done today.  Follow up in 2 month(s) for re-evaluation of psoriasis.    Continue with Humira injections (after tuberculosis screening results have returned).    The patient was counseled to use products free of fragrance, dyes, and plants. The importance of using bland cleansers and the regular use of heavy bland emollient creams was impressed upon the patient.    TT: 20 minutes.  CT: 15 minutes.    The information in this document, created by the medical scribe for me, accurately reflects the services I personally performed and the decisions made by me. I have reviewed and approved this document for accuracy prior to leaving the patient care area.  June 17, 2019 3:48 PM  Tabatha Castellanos MD  [facility] [place]    Again, thank you for allowing me to participate in the care of your patient.        Sincerely,        Tabatha Castellanos MD

## 2019-06-17 NOTE — PATIENT INSTRUCTIONS
FUTURE APPOINTMENTS  Please get labs done today.  Follow up in 2 month(s) for re-evaluation of psoriasis.    Continue with Humira injections (after tuberculosis screening results have returned).

## 2019-06-17 NOTE — PROGRESS NOTES
[facility] - PRIMARY CARE SKIN    CC: psoriasis  SUBJECTIVE:   Rima Diaz is a(n) 24 year old female who presents to clinic today for follow-up of psoriasis that started years ago.    Areas of skin involvement: Scalp, Face, Neck, Trunk, Arms, Legs, Hands, Buttocks and Groin.  Joint aches: YES.    Current treatment: She reports that she is on Humira, although her last injection may have been in January.  Response to treatment: She reports that psoriasis patches had improved while on Humira.    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    Patient Active Problem List   Diagnosis     Other and unspecified alcohol dependence, unspecified drinking behavior     Cannabis dependence (H)     Homicidal ideation     Anxiety     Mood disorder (H)     Polysubstance dependence (H)     Disruptive behavior disorder     ADHD (attention deficit hyperactivity disorder)     Major depressive disorder, recurrent episode, moderate (H)     Psoriasis     Suicide attempt by substance overdose, initial encounter (H)     Attempted suicide (H)     On Depo-Provera for contraception       Past Medical History:   Diagnosis Date     ADHD (attention deficit hyperactivity disorder)      Alcohol abuse      Anxiety      Benzodiazepine abuse, continuous (H)      Depression      Dextromethorphan overdose, intentional self-harm, initial encounter (H) 2018     History of suicidal ideation      Opiate abuse, episodic (H)      Psoriasis     Past Surgical History:   Procedure Laterality Date      SECTION N/A 8/3/2017    Procedure:  SECTION;;  Surgeon: Alivia Arizmendi MD;  Location: [facility]      Social History     Tobacco Use     Smoking status:  Former Smoker     Types: Other     Last attempt to quit: 2016     Years since quittin.6     Smokeless tobacco: Never Used   Substance Use Topics     Alcohol use: No     Alcohol/week: 0.0 oz     Comment: recovering     Drug use: No     Types: Methamphetamines, Opiates, Amphetamines, Benzodiazepines, Hydrocodone, PCP     Comment: everything is former     Family History     Problem (# of Occurrences) Relation (Name,Age of Onset)    Suicide (1) Other: sister 2014           Current Outpatient Medications   Medication Sig Dispense Refill     adalimumab (HUMIRA) 40 MG/0.8ML pen kit initial dose of 80 mg (2 pens), followed by 40 mg given every other week starting one week after the initial dose 4 kit 3       No Known Allergies     ROS: 14 point review of systems was negative except the symptoms listed above in the HPI.    This document serves as a record of the services and decisions personally performed and made by Tabatha Castellanos MD and was created by Ellis Varghese, a trained medical scribe, based on personal observations and provider statements to the medical scribe.  2019 3:48 PM   Ellis Varghese    OBJECTIVE:   GENERAL: healthy, alert and no distress.  SKIN: Cortes Skin Type - III-IV.  Face, Neck, Trunk, Arms and Legs examined. The dermatoscope was used to help evaluate pigmented lesions.  Skin Pertinent Findings:  Scattered scaly erythematous plaques on arms, legs. Multiple plaques on back and abdomen.    Diagnostic Test Results:  No results found for this or any previous visit (from the past 24 hour(s)).    ASSESSMENT:     Encounter Diagnoses   Name Primary?     Psoriasis Yes     Encounter for monitoring of adalimumab therapy      MDM: .  Discussion regarding etiology, spectrum of psoriasis, treatment options, aggravating factors.    PLAN:   Patient Instructions   FUTURE APPOINTMENTS  Please get labs done today.  Follow up in 2 month(s) for re-evaluation of psoriasis.    Continue with Humira  injections (after tuberculosis screening results have returned).    The patient was counseled to use products free of fragrance, dyes, and plants. The importance of using bland cleansers and the regular use of heavy bland emollient creams was impressed upon the patient.    TT: 20 minutes.  CT: 15 minutes.    The information in this document, created by the medical scribe for me, accurately reflects the services I personally performed and the decisions made by me. I have reviewed and approved this document for accuracy prior to leaving the patient care area.  June 17, 2019 3:48 PM  Tabatha Castellanos MD  [facility] [place]

## 2019-06-19 LAB
GAMMA INTERFERON BACKGROUND BLD IA-ACNC: 0.03 IU/ML
M TB IFN-G BLD-IMP: NEGATIVE
M TB IFN-G CD4+ BCKGRND COR BLD-ACNC: >10 IU/ML
MITOGEN IGNF BCKGRD COR BLD-ACNC: 0 IU/ML
MITOGEN IGNF BCKGRD COR BLD-ACNC: 0 IU/ML

## 2019-06-20 ENCOUNTER — TELEPHONE (OUTPATIENT)
Dept: FAMILY MEDICINE | Facility: CLINIC | Age: 24
End: 2019-06-20

## 2019-06-20 DIAGNOSIS — L40.0 PLAQUE PSORIASIS: Primary | ICD-10-CM

## 2019-06-20 NOTE — TELEPHONE ENCOUNTER
----- Message from Tabatha Castellanos MD sent at 6/20/2019  1:51 PM CDT -----  Please call Rima and let her know that the Quantiferon TB is negative. Ill faxed in the Teklatechira order to the [facility] pharmacy.     THank you,   Tabatha Castellanos M.D.

## 2019-06-20 NOTE — TELEPHONE ENCOUNTER
Left message on voice mail with provider message.  Sarah Clarke,RN BSN  [facility]  [phone]

## 2019-06-21 ENCOUNTER — TELEPHONE (OUTPATIENT)
Dept: FAMILY MEDICINE | Facility: CLINIC | Age: 24
End: 2019-06-21

## 2019-06-21 NOTE — TELEPHONE ENCOUNTER
PA Initiation    Medication: humira pa pending   Insurance Company: [facility] - Phone [phone] Fax [phone]  Pharmacy Filling the Rx: [facility] - [place], [place] - [address] AT [facility]  Filling Pharmacy Phone:    Filling Pharmacy Fax:    Start Date: 6/21/2019

## 2019-06-24 NOTE — TELEPHONE ENCOUNTER
Prior Authorization Approval    Authorization Effective Date: 6/21/2019  Authorization Expiration Date: 6/21/2020  Medication: humira pa approved  Approved Dose/Quantity: starter and maintenance  Reference #: [id number]   Insurance Company: [employer] [place] - Phone [phone] Fax [phone]  Expected CoPay: na     CoPay Card Available: Yes    Foundation Assistance Needed: na  Which Pharmacy is filling the prescription (Not needed for infusion/clinic administered): [facility] - [place], [place] - [address] AT [facility]  Pharmacy Notified: No  Patient Notified: No

## 2019-08-19 ENCOUNTER — OFFICE VISIT (OUTPATIENT)
Dept: FAMILY MEDICINE | Facility: CLINIC | Age: 24
End: 2019-08-19
Payer: COMMERCIAL

## 2019-08-19 VITALS — DIASTOLIC BLOOD PRESSURE: 58 MMHG | SYSTOLIC BLOOD PRESSURE: 122 MMHG

## 2019-08-19 DIAGNOSIS — L40.9 PSORIASIS: Primary | ICD-10-CM

## 2019-08-19 DIAGNOSIS — L40.0 PLAQUE PSORIASIS: ICD-10-CM

## 2019-08-19 DIAGNOSIS — Z79.620 ADALIMUMAB (HUMIRA) LONG-TERM USE: ICD-10-CM

## 2019-08-19 LAB
BASOPHILS # BLD AUTO: 0 10E9/L (ref 0–0.2)
BASOPHILS NFR BLD AUTO: 0.3 %
DIFFERENTIAL METHOD BLD: NORMAL
EOSINOPHIL # BLD AUTO: 0.2 10E9/L (ref 0–0.7)
EOSINOPHIL NFR BLD AUTO: 3.3 %
ERYTHROCYTE [DISTWIDTH] IN BLOOD BY AUTOMATED COUNT: 13 % (ref 10–15)
HCT VFR BLD AUTO: 38.8 % (ref 35–47)
HGB BLD-MCNC: 12.6 G/DL (ref 11.7–15.7)
LYMPHOCYTES # BLD AUTO: 1.6 10E9/L (ref 0.8–5.3)
LYMPHOCYTES NFR BLD AUTO: 25.7 %
MCH RBC QN AUTO: 29.1 PG (ref 26.5–33)
MCHC RBC AUTO-ENTMCNC: 32.5 G/DL (ref 31.5–36.5)
MCV RBC AUTO: 90 FL (ref 78–100)
MONOCYTES # BLD AUTO: 0.4 10E9/L (ref 0–1.3)
MONOCYTES NFR BLD AUTO: 6.7 %
NEUTROPHILS # BLD AUTO: 4 10E9/L (ref 1.6–8.3)
NEUTROPHILS NFR BLD AUTO: 64 %
PLATELET # BLD AUTO: 222 10E9/L (ref 150–450)
RBC # BLD AUTO: 4.33 10E12/L (ref 3.8–5.2)
WBC # BLD AUTO: 6.3 10E9/L (ref 4–11)

## 2019-08-19 PROCEDURE — 99213 OFFICE O/P EST LOW 20 MIN: CPT | Performed by: FAMILY MEDICINE

## 2019-08-19 PROCEDURE — 85025 COMPLETE CBC W/AUTO DIFF WBC: CPT | Performed by: FAMILY MEDICINE

## 2019-08-19 PROCEDURE — 36415 COLL VENOUS BLD VENIPUNCTURE: CPT | Performed by: FAMILY MEDICINE

## 2019-08-19 PROCEDURE — 80053 COMPREHEN METABOLIC PANEL: CPT | Performed by: FAMILY MEDICINE

## 2019-08-19 NOTE — PATIENT INSTRUCTIONS
FUTURE APPOINTMENTS  Please get labs done today.  Follow up in 6 month(s).    Continue with Humira injections every other week.    Continue taking Vitamin D supplement.  OVER-THE-COUNTER (OTC) SUPPLEMENTS  Take by mouth a supplement of Vitamin D3 1000 IU/day.

## 2019-08-19 NOTE — PROGRESS NOTES
"[facility] - PRIMARY CARE SKIN    CC: psoriasis  SUBJECTIVE:   Rima Diaz is a(n) 24 year old female who presents to clinic today for follow-up of psoriasis that started years ago.    Areas of skin involvement: Scalp, Face, Neck, Trunk, Arms, Legs, Hands, Buttocks and Groin.  Joint aches: YES.    Medication: adalimumab 40 mg/0.8 mL q 2 weeks. (restarted in June 2019)  She feels \"really sick\" for the first 1-3 days after injection. Weakness was previously noted with injections, although this issue has resolved. She denies any severe vomiting. Occasional irritation at injection sites is transient.  She is not taking a Vitamin D supplement.    Symptoms have been clearing up, and she does not have any more itchy patches.    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    Patient Active Problem List   Diagnosis     Other and unspecified alcohol dependence, unspecified drinking behavior     Cannabis dependence (H)     Homicidal ideation     Anxiety     Mood disorder (H)     Polysubstance dependence (H)     Disruptive behavior disorder     ADHD (attention deficit hyperactivity disorder)     Major depressive disorder, recurrent episode, moderate (H)     Psoriasis     Suicide attempt by substance overdose, initial encounter (H)     Attempted suicide (H)     On Depo-Provera for contraception       Past Medical History:   Diagnosis Date     ADHD (attention deficit hyperactivity disorder)      Alcohol abuse      Anxiety      Benzodiazepine abuse, continuous (H)      Depression      Dextromethorphan overdose, intentional self-harm, initial encounter (H) 04/2018     History of suicidal ideation      Opiate abuse, episodic (H)      Psoriasis     " Past Surgical History:   Procedure Laterality Date      SECTION N/A 8/3/2017    Procedure:  SECTION;;  Surgeon: Alivia Arizmendi MD;  Location:  [facility]      Social History     Tobacco Use     Smoking status: Former Smoker     Types: Other     Last attempt to quit: 2016     Years since quittin.7     Smokeless tobacco: Never Used   Substance Use Topics     Alcohol use: No     Alcohol/week: 0.0 oz     Comment: recovering     Drug use: No     Types: Methamphetamines, Opiates, Amphetamines, Benzodiazepines, Hydrocodone, PCP     Comment: everything is former     Family History     Problem (# of Occurrences) Relation (Name,Age of Onset)    Suicide (1) Other: sister 2014           Current Outpatient Medications   Medication Sig Dispense Refill     adalimumab (HUMIRA) 40 MG/0.8ML pen kit Inject 0.8 mLs (40 mg) Subcutaneous every 14 days 2 pen 6       No Known Allergies     ROS: 14 point review of systems was negative except the symptoms listed above in the HPI.    This document serves as a record of the services and decisions personally performed and made by Tabatha Castellanos MD and was created by Ellis Varghese, a trained medical scribe, based on personal observations and provider statements to the medical scribe.  2019 2:45 PM   Ellis Varghese    OBJECTIVE:   GENERAL: healthy, alert and no distress.  SKIN: Cortes Skin Type - III-IV.  Face, Neck, Trunk, Arms and Legs examined. The dermatoscope was used to help evaluate pigmented lesions.  Skin Pertinent Findings:  Scattered post-inflammatory hypopigmented patches on arms, legs, and trunk.    Diagnostic Test Results:  No results found for this or any previous visit (from the past 24 hour(s)).    ASSESSMENT:     Encounter Diagnoses   Name Primary?     Psoriasis Yes     Adalimumab (Humira) long-term use      Plaque psoriasis      MDM: .  Discussion regarding etiology, spectrum of psoriasis, treatment options, aggravating factors.    PLAN:    Patient Instructions   FUTURE APPOINTMENTS  Please get labs done today.  Follow up in 6 month(s).    Continue with Humira injections every other week.    Continue taking Vitamin D supplement.  OVER-THE-COUNTER (OTC) SUPPLEMENTS  Take by mouth a supplement of Vitamin D3 1000 IU/day.      TT: 20 minutes.  CT: 15 minutes.    The information in this document, created by the medical scribe for me, accurately reflects the services I personally performed and the decisions made by me. I have reviewed and approved this document for accuracy prior to leaving the patient care area.  August 19, 2019 2:40 PM  Tabatha Castellanos MD  [facility] [place]

## 2019-08-19 NOTE — LETTER
"    8/19/2019         RE: Rima Diaz  [address]  [address] [place] [address]        Dear Colleague,    Thank you for referring your patient, Rima Diaz, to the [facility]. Please see a copy of my visit note below.    [facility] - PRIMARY CARE SKIN    CC: psoriasis  SUBJECTIVE:   Rima Diaz is a(n) 24 year old female who presents to clinic today for follow-up of psoriasis that started years ago.    Areas of skin involvement: Scalp, Face, Neck, Trunk, Arms, Legs, Hands, Buttocks and Groin.  Joint aches: YES.    Medication: adalimumab 40 mg/0.8 mL q 2 weeks. (restarted in June 2019)  She feels \"really sick\" for the first 1-3 days after injection. Weakness was previously noted with injections, although this issue has resolved. She denies any severe vomiting. Occasional irritation at injection sites is transient.  She is not taking a Vitamin D supplement.    Symptoms have been clearing up, and she does not have any more itchy patches.    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    Patient Active Problem List   Diagnosis     Other and unspecified alcohol dependence, unspecified drinking behavior     Cannabis dependence (H)     Homicidal ideation     Anxiety     Mood disorder (H)     Polysubstance dependence (H)     Disruptive behavior disorder     ADHD (attention deficit hyperactivity disorder)     Major depressive disorder, recurrent episode, moderate (H)     Psoriasis     Suicide attempt by substance overdose, initial encounter (H)     Attempted suicide (H)     On Depo-Provera for contraception       Past Medical History:   Diagnosis Date     ADHD (attention deficit hyperactivity disorder)  "     Alcohol abuse      Anxiety      Benzodiazepine abuse, continuous (H)      Depression      Dextromethorphan overdose, intentional self-harm, initial encounter (H) 2018     History of suicidal ideation      Opiate abuse, episodic (H)      Psoriasis     Past Surgical History:   Procedure Laterality Date      SECTION N/A 8/3/2017    Procedure:  SECTION;;  Surgeon: Alivia Arizmendi MD;  Location:  [facility]      Social History     Tobacco Use     Smoking status: Former Smoker     Types: Other     Last attempt to quit: 2016     Years since quittin.7     Smokeless tobacco: Never Used   Substance Use Topics     Alcohol use: No     Alcohol/week: 0.0 oz     Comment: recovering     Drug use: No     Types: Methamphetamines, Opiates, Amphetamines, Benzodiazepines, Hydrocodone, PCP     Comment: everything is former     Family History     Problem (# of Occurrences) Relation (Name,Age of Onset)    Suicide (1) Other: sister 2014           Current Outpatient Medications   Medication Sig Dispense Refill     adalimumab (HUMIRA) 40 MG/0.8ML pen kit Inject 0.8 mLs (40 mg) Subcutaneous every 14 days 2 pen 6       No Known Allergies     ROS: 14 point review of systems was negative except the symptoms listed above in the HPI.    This document serves as a record of the services and decisions personally performed and made by Tabatha Castellanos MD and was created by Ellis Varghese, a trained medical scribe, based on personal observations and provider statements to the medical scribe.  2019 2:45 PM   Ellis Varghese    OBJECTIVE:   GENERAL: healthy, alert and no distress.  SKIN: Cortes Skin Type - III-IV.  Face, Neck, Trunk, Arms and Legs examined. The dermatoscope was used to help evaluate pigmented lesions.  Skin Pertinent Findings:  Scattered post-inflammatory hypopigmented patches on arms, legs, and trunk.    Diagnostic Test Results:  No results found for this or any previous visit (from the past 24  hour(s)).    ASSESSMENT:     Encounter Diagnoses   Name Primary?     Psoriasis Yes     Adalimumab (Humira) long-term use      Plaque psoriasis      MDM: .  Discussion regarding etiology, spectrum of psoriasis, treatment options, aggravating factors.    PLAN:   Patient Instructions   FUTURE APPOINTMENTS  Please get labs done today.  Follow up in 6 month(s).    Continue with Humira injections every other week.    Continue taking Vitamin D supplement.  OVER-THE-COUNTER (OTC) SUPPLEMENTS  Take by mouth a supplement of Vitamin D3 1000 IU/day.      TT: 20 minutes.  CT: 15 minutes.    The information in this document, created by the medical scribe for me, accurately reflects the services I personally performed and the decisions made by me. I have reviewed and approved this document for accuracy prior to leaving the patient care area.  August 19, 2019 2:40 PM  Tabatha Castellanos MD  [facility] [place]    Again, thank you for allowing me to participate in the care of your patient.        Sincerely,        Tabatha Castellanos MD

## 2019-08-20 ENCOUNTER — TELEPHONE (OUTPATIENT)
Dept: FAMILY MEDICINE | Facility: CLINIC | Age: 24
End: 2019-08-20

## 2019-08-20 LAB
ALBUMIN SERPL-MCNC: 4.1 G/DL (ref 3.4–5)
ALP SERPL-CCNC: 66 U/L (ref 40–150)
ALT SERPL W P-5'-P-CCNC: 14 U/L (ref 0–50)
ANION GAP SERPL CALCULATED.3IONS-SCNC: 9 MMOL/L (ref 3–14)
AST SERPL W P-5'-P-CCNC: 15 U/L (ref 0–45)
BILIRUB SERPL-MCNC: 1 MG/DL (ref 0.2–1.3)
BUN SERPL-MCNC: 13 MG/DL (ref 7–30)
CALCIUM SERPL-MCNC: 9 MG/DL (ref 8.5–10.1)
CHLORIDE SERPL-SCNC: 110 MMOL/L (ref 94–109)
CO2 SERPL-SCNC: 23 MMOL/L (ref 20–32)
CREAT SERPL-MCNC: 0.53 MG/DL (ref 0.52–1.04)
GFR SERPL CREATININE-BSD FRML MDRD: >90 ML/MIN/{1.73_M2}
GLUCOSE SERPL-MCNC: 88 MG/DL (ref 70–99)
POTASSIUM SERPL-SCNC: 3.8 MMOL/L (ref 3.4–5.3)
PROT SERPL-MCNC: 7.3 G/DL (ref 6.8–8.8)
SODIUM SERPL-SCNC: 142 MMOL/L (ref 133–144)

## 2019-08-20 NOTE — TELEPHONE ENCOUNTER
Left message on answering machine for patient to call back.    Sarah ClarkeRN BSN  [facility]  [phone]  \

## 2019-08-20 NOTE — TELEPHONE ENCOUNTER
----- Message from Tabatha Castellanos MD sent at 8/20/2019  1:30 PM CDT -----  Please call the patient and let them know that the lab work was normal.   Thank you, Tabatha Castellanos M.D.

## 2019-08-20 NOTE — TELEPHONE ENCOUNTER
Rima returned . RN not available. Advised pt that Dr Castellanos had reviewed her labs and they were normal. Rima didn't have any further questions at this time.    Dinora Schaffer  Patient Representative

## 2019-10-29 ENCOUNTER — TELEPHONE (OUTPATIENT)
Dept: PHARMACY | Facility: CLINIC | Age: 24
End: 2019-10-29

## 2019-10-29 DIAGNOSIS — L40.0 PLAQUE PSORIASIS: ICD-10-CM

## 2019-10-29 NOTE — TELEPHONE ENCOUNTER
Last Written Prescription Date:  08/19/19  Last Fill Quantity: 2pen,  # refills: 6   Last office visit: 8/19/2019 with prescribing provider:     Future Office Visit:    Requested Prescriptions   Pending Prescriptions Disp Refills     adalimumab (HUMIRA) 40 MG/0.8ML pen kit 2 pen 6     Sig: Inject 0.8 mLs (40 mg) Subcutaneous every 14 days       There is no refill protocol information for this order

## 2020-03-03 ENCOUNTER — APPOINTMENT (OUTPATIENT)
Dept: ULTRASOUND IMAGING | Facility: CLINIC | Age: 25
End: 2020-03-03
Attending: NURSE PRACTITIONER
Payer: COMMERCIAL

## 2020-03-03 ENCOUNTER — HOSPITAL ENCOUNTER (EMERGENCY)
Facility: CLINIC | Age: 25
Discharge: HOME | End: 2020-03-03
Attending: NURSE PRACTITIONER | Admitting: NURSE PRACTITIONER
Payer: COMMERCIAL

## 2020-03-03 VITALS
BODY MASS INDEX: 19.97 KG/M2 | HEART RATE: 79 BPM | TEMPERATURE: 98 F | WEIGHT: 117 LBS | DIASTOLIC BLOOD PRESSURE: 75 MMHG | HEIGHT: 64 IN | SYSTOLIC BLOOD PRESSURE: 125 MMHG | RESPIRATION RATE: 16 BRPM | OXYGEN SATURATION: 100 %

## 2020-03-03 DIAGNOSIS — N83.201 RIGHT OVARIAN CYST: ICD-10-CM

## 2020-03-03 LAB
ALBUMIN SERPL-MCNC: 4 G/DL (ref 3.4–5)
ALBUMIN UR-MCNC: 10 MG/DL
ALP SERPL-CCNC: 67 U/L (ref 40–150)
ALT SERPL W P-5'-P-CCNC: 14 U/L (ref 0–50)
AMORPH CRY #/AREA URNS HPF: ABNORMAL /HPF
ANION GAP SERPL CALCULATED.3IONS-SCNC: 3 MMOL/L (ref 3–14)
APPEARANCE UR: CLEAR
AST SERPL W P-5'-P-CCNC: 13 U/L (ref 0–45)
B-HCG FREE SERPL-ACNC: <5 IU/L
BASOPHILS # BLD AUTO: 0 10E9/L (ref 0–0.2)
BASOPHILS NFR BLD AUTO: 0.3 %
BILIRUB SERPL-MCNC: 0.8 MG/DL (ref 0.2–1.3)
BILIRUB UR QL STRIP: NEGATIVE
BUN SERPL-MCNC: 17 MG/DL (ref 7–30)
CALCIUM SERPL-MCNC: 8.8 MG/DL (ref 8.5–10.1)
CHLORIDE SERPL-SCNC: 108 MMOL/L (ref 94–109)
CO2 SERPL-SCNC: 26 MMOL/L (ref 20–32)
COLOR UR AUTO: YELLOW
CREAT SERPL-MCNC: 0.58 MG/DL (ref 0.52–1.04)
DIFFERENTIAL METHOD BLD: NORMAL
EOSINOPHIL # BLD AUTO: 0.4 10E9/L (ref 0–0.7)
EOSINOPHIL NFR BLD AUTO: 4.2 %
ERYTHROCYTE [DISTWIDTH] IN BLOOD BY AUTOMATED COUNT: 12.8 % (ref 10–15)
GFR SERPL CREATININE-BSD FRML MDRD: >90 ML/MIN/{1.73_M2}
GLUCOSE SERPL-MCNC: 95 MG/DL (ref 70–99)
GLUCOSE UR STRIP-MCNC: NEGATIVE MG/DL
HCT VFR BLD AUTO: 42 % (ref 35–47)
HGB BLD-MCNC: 13.8 G/DL (ref 11.7–15.7)
HGB UR QL STRIP: NEGATIVE
IMM GRANULOCYTES # BLD: 0 10E9/L (ref 0–0.4)
IMM GRANULOCYTES NFR BLD: 0.1 %
KETONES UR STRIP-MCNC: NEGATIVE MG/DL
LEUKOCYTE ESTERASE UR QL STRIP: NEGATIVE
LYMPHOCYTES # BLD AUTO: 1.5 10E9/L (ref 0.8–5.3)
LYMPHOCYTES NFR BLD AUTO: 16.4 %
MCH RBC QN AUTO: 30.5 PG (ref 26.5–33)
MCHC RBC AUTO-ENTMCNC: 32.9 G/DL (ref 31.5–36.5)
MCV RBC AUTO: 93 FL (ref 78–100)
MONOCYTES # BLD AUTO: 0.7 10E9/L (ref 0–1.3)
MONOCYTES NFR BLD AUTO: 6.9 %
MUCOUS THREADS #/AREA URNS LPF: PRESENT /LPF
NEUTROPHILS # BLD AUTO: 6.8 10E9/L (ref 1.6–8.3)
NEUTROPHILS NFR BLD AUTO: 72.1 %
NITRATE UR QL: NEGATIVE
NRBC # BLD AUTO: 0 10*3/UL
NRBC BLD AUTO-RTO: 0 /100
PH UR STRIP: 8 PH (ref 5–7)
PLATELET # BLD AUTO: 213 10E9/L (ref 150–450)
POTASSIUM SERPL-SCNC: 3.8 MMOL/L (ref 3.4–5.3)
PROT SERPL-MCNC: 7.3 G/DL (ref 6.8–8.8)
RBC # BLD AUTO: 4.52 10E12/L (ref 3.8–5.2)
RBC #/AREA URNS AUTO: <1 /HPF (ref 0–2)
SODIUM SERPL-SCNC: 137 MMOL/L (ref 133–144)
SOURCE: ABNORMAL
SP GR UR STRIP: 1.02 (ref 1–1.03)
SPECIMEN SOURCE: NORMAL
SQUAMOUS #/AREA URNS AUTO: 12 /HPF (ref 0–1)
UROBILINOGEN UR STRIP-MCNC: NORMAL MG/DL (ref 0–2)
WBC # BLD AUTO: 9.4 10E9/L (ref 4–11)
WBC #/AREA URNS AUTO: <1 /HPF (ref 0–5)
WET PREP SPEC: NORMAL

## 2020-03-03 PROCEDURE — 87591 N.GONORRHOEAE DNA AMP PROB: CPT | Performed by: NURSE PRACTITIONER

## 2020-03-03 PROCEDURE — 93976 VASCULAR STUDY: CPT | Mod: XS

## 2020-03-03 PROCEDURE — 87210 SMEAR WET MOUNT SALINE/INK: CPT | Performed by: NURSE PRACTITIONER

## 2020-03-03 PROCEDURE — 99284 EMERGENCY DEPT VISIT MOD MDM: CPT | Mod: 25

## 2020-03-03 PROCEDURE — 80053 COMPREHEN METABOLIC PANEL: CPT | Performed by: NURSE PRACTITIONER

## 2020-03-03 PROCEDURE — 85025 COMPLETE CBC W/AUTO DIFF WBC: CPT | Performed by: NURSE PRACTITIONER

## 2020-03-03 PROCEDURE — 81001 URINALYSIS AUTO W/SCOPE: CPT | Performed by: NURSE PRACTITIONER

## 2020-03-03 PROCEDURE — 87491 CHLMYD TRACH DNA AMP PROBE: CPT | Performed by: NURSE PRACTITIONER

## 2020-03-03 PROCEDURE — 84702 CHORIONIC GONADOTROPIN TEST: CPT

## 2020-03-03 RX ORDER — ACETAMINOPHEN 325 MG/1
975 TABLET ORAL ONCE
Status: DISCONTINUED | OUTPATIENT
Start: 2020-03-03 | End: 2020-03-03 | Stop reason: HOSPADM

## 2020-03-03 ASSESSMENT — ENCOUNTER SYMPTOMS
HEADACHES: 1
DIARRHEA: 0
FEVER: 0
DYSURIA: 0
ABDOMINAL PAIN: 1
CONSTIPATION: 0
NAUSEA: 1
FLANK PAIN: 0

## 2020-03-03 ASSESSMENT — MIFFLIN-ST. JEOR: SCORE: 1260.71

## 2020-03-03 NOTE — ED AVS SNAPSHOT
Emergency Department  [address] [facility] [address]  Phone:  [phone]  Fax:  [phone]                                    Rima Diaz   MRN: [id number]    Department:   Emergency Department   Date of Visit:  3/3/2020           After Visit Summary Signature Page    I have received my discharge instructions, and my questions have been answered. I have discussed any challenges I see with this plan with the nurse or doctor.    ..........................................................................................................................................  Patient/Patient Representative Signature      ..........................................................................................................................................  Patient Representative Print Name and Relationship to Patient    ..................................................               ................................................  Date                                   Time    ..........................................................................................................................................  Reviewed by Signature/Title    ...................................................              ..............................................  Date                                               Time          22EPIC Rev 08/18

## 2020-03-03 NOTE — ED PROVIDER NOTES
History     Chief Complaint:  Abdominal Pain      HPI   Rima Diaz is a 25 year old female  s/p C section, on Humira for psoriasis, with a history of pyelonephritis, alcohol abuse, polysubstance abuse, and anxiety who presents to the emergency department for evaluation of abdominal pain. This morning, she experienced onset of acute, sharp, knife-like left lower quadrant abdominal pain with associated nausea prior to eating breakfast. Rima has also had a persistent headache for the last three days, and this is abnormal for her. She denies vaginal discharge, vaginal itching or pain, fever, urinary symptoms, flank pain, and recent diarrhea or constipation; she normally has one bowel movement per day but has not yet. Patient is due for her period this week, and is currently not using contraception with her ; she is unsure if she could be pregnant. In addition to her above symptoms, Rima notes increasing stress as she and her  are moving currently.     Allergies:  No Known Drug Allergies     Medications:    Humira      Past Medical History:    Attention deficity hyperactivity disorder  Alcohol abuse   Anxiety  Benzodiazepine abuse, continuous   Depression  Dextromethorphan overdose, intentional self harm  History of suicidal ideation  Opiate abuse, episodic   Psoriasis   Cannabis dependence  Homicidal ideation   Pyelonephritis     Past Surgical History:    C section     Family History:    Sister - suicide     Social History:  The patient was alone.  Smoking Status: Former smoker   Smokeless Tobacco: Never  Alcohol Use: No   Drug use: Former Methamphetamines, opiates, amphetamines, benzodiazepines, hydrocodone, PCP  Marital Status:       Review of Systems   Constitutional: Negative for fever.   Gastrointestinal: Positive for abdominal pain and nausea. Negative for constipation and diarrhea.   Genitourinary: Negative.  Negative for dysuria, flank pain, menstrual problem, vaginal  "discharge and vaginal pain.   Neurological: Positive for headaches.   All other systems reviewed and are negative.    Physical Exam     Patient Vitals for the past 24 hrs:   BP Temp Temp src Pulse Heart Rate Resp SpO2 Height Weight   03/03/20 1038 122/77 98  F (36.7  C) Oral 79 79 16 100 % 1.626 m (5' 4\") 53.1 kg (117 lb)       Physical Exam  Nursing notes reviewed. Vitals reviewed.  General: Alert. Well kept.  Eyes:  Conjunctiva non-injected, non-icteric.  Neck/Throat: Moist mucous membranes. Normal voice.  Cardiac: Regular rhythm. Normal heart sounds.  Pulmonary: Clear and equal breath sounds bilaterally.   Abdomen: soft and non-tender. No CVA tenderness.  : Pelvic exam performed in presence of chaperone.  External genitalia without rash or sore.  Normal vaginal mucosa with clear discharge.  No CMT but she is tender in the right adenexal area.  No pain over mcburneys point.   Musculoskeletal: Normal gross range of motion of all 4 extremities.   Neurological: Alert and oriented x4.   Skin: Warm and dry. Normal appearance of visualized exposed skin without rashes or petechiae.  Psych: Affect normal. Good eye contact.    Emergency Department Course     Imaging:  Radiology findings were communicated with the patient who voiced understanding of the findings.    US Pelvic Complete w Transvaginal & Abd/Pel Duplex Limited  IMPRESSION:   1. Slightly complex right ovarian cystic lesion measures 3.7 cm.  Follow-up ultrasound in 6-12 weeks may be helpful to ensure  resolution.  2. Otherwise unremarkable pelvic ultrasound. No convincing sonographic  evidence for ovarian torsion.  Report per radiology     Laboratory:  Laboratory findings were communicated with the patient who voiced understanding of the findings.    CBC: AWNL. (WBC 9.4, HGB 13.8, )   CMP: AWNL (Creatinine 0.58)      Neisseria Gonorrhea PCR: pending  Chlamydia Trachomatis PCR: pending    Wet prep: AWNL   UA: Yellow, clear urine, pH urine 8.0 (H), " protein albumin urine 10, squamous epithelial/HPF urine 12 (H), mucous urine present, amorphous crystals few    ISAT HCG Quantitative Pregnancy POCT (1252): <5.0    Emergency Department Course:  Past medical records, nursing notes, and vitals reviewed.    1255 I performed an exam of the patient as documented above.     IV was inserted and blood was drawn for laboratory testing, results above.  The patient provided a urine sample here in the emergency department. This was sent for laboratory testing, findings above.  The patient was sent for a US Pelvic Complete w Transvaginal & Abd/Pelvis Duplex Limited while in the emergency department, results above.     1316 Pelvic exam performed with female chaperone as noted above.     1525 I rechecked the patient and discussed the results of her workup thus far. Her pain is completely gone.     Findings and plan explained to the Patient. Patient discharged home with instructions regarding supportive care, medications, and reasons to return. The importance of close follow-up was reviewed.     I personally reviewed the laboratory and imaging results with the Patient and answered all related questions prior to discharge.     Impression & Plan     Medical Decision Making:  Rima Diaz is a 25 year old female who presents with pelvic pain.  They look overall well.  A broad differential diagnosis was considered including colitis, appendicitis, intestinal cramping, pyelonephritis, UTI, kidney stone, constipation, diverticulitis, volvulus, ileus, obstruction, pregnancy (ectopic or intrauterine), ovarian cyst (enlarged or ruptured), ovarian torsion, PID, etc as possibilities.  The workup in the ED shows likely ovarian cyst as source of pain. Cultures were sent.  Wet prep is negative.  She is not pregnant. Doubt PID or TOA given clear findings of cyst without signs of abscess.  She has no pain over the right lower quadrant and with on fevers, normal WBC, pain resolved in the ED,  and clear finding of ovarian cyst on ultrasound, appendicitis is unlikely.  This was discussed with the patient and she will return with worsening or change in pain, fevers, or any concerns.  No other etiology for the patients pain is found at this point and my suspicion of an intraabdominal catastrophe or other worrisome etiology is very low. No indication for admission.   Patient is hemodynamically stable in ED. Plan is home with recheck by gynecology in 2-3 days and to discuss follow-up ultrasound of cyst to ensure resolution.  Return for fevers, increasing pain, vaginal bleeding, other new symptoms develop.  Abdominal pain handout given.  Questions were answered.     Diagnosis:    ICD-10-CM    1. Right ovarian cyst N83.201      Disposition:  Discharged to home.    Scribe Disclosure:  I, Liana Almanzar, am serving as a scribe at 12:38 PM on 3/3/2020 to document services personally performed by Ирина Otoole DNP based on my observations and the provider's statements to me. 3/3/2020    EMERGENCY DEPARTMENT       Ирина Otoole CNP  03/03/20 6398
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
## (undated) DEVICE — DRSG STERI STRIP 1/2X4" R1547

## (undated) DEVICE — PREP CHLORAPREP 26ML TINTED ORANGE  260815

## (undated) DEVICE — SU PLAIN 2-0 CT 27" 853H

## (undated) DEVICE — SU PLAIN 0 FN-2 27" N864H

## (undated) DEVICE — GLOVE PROTEXIS POWDER FREE 7.0 ORTHOPEDIC 2D73ET70

## (undated) DEVICE — LINEN C-SECTION 5415

## (undated) DEVICE — GLOVE PROTEXIS W/NEU-THERA 6.0  2D73TE60

## (undated) DEVICE — PACK C-SECTION LF PL15OTA83B

## (undated) DEVICE — SOL WATER IRRIG 1000ML BOTTLE 07139-09

## (undated) DEVICE — SU VICRYL 3-0 SH 27" J316H

## (undated) DEVICE — SU CHROMIC 0 CT 27" 802H

## (undated) DEVICE — SUCTION CANISTER MEDIVAC LINER 3000ML W/LID 65651-530

## (undated) DEVICE — ESU GROUND PAD UNIVERSAL W/O CORD

## (undated) DEVICE — SOL NACL 0.9% IRRIG 1000ML BOTTLE 07138-09

## (undated) DEVICE — CATH TRAY FOLEY 16FR BARDEX W/DRAIN BAG STATLOCK 300316A

## (undated) DEVICE — SU VICRYL 0 CT-1 27" J340H

## (undated) DEVICE — BLADE CLIPPER 4406